# Patient Record
Sex: MALE | Race: WHITE | NOT HISPANIC OR LATINO
[De-identification: names, ages, dates, MRNs, and addresses within clinical notes are randomized per-mention and may not be internally consistent; named-entity substitution may affect disease eponyms.]

---

## 2019-11-19 ENCOUNTER — FORM ENCOUNTER (OUTPATIENT)
Age: 69
End: 2019-11-19

## 2019-11-20 ENCOUNTER — OUTPATIENT (OUTPATIENT)
Dept: OUTPATIENT SERVICES | Facility: HOSPITAL | Age: 69
LOS: 1 days | End: 2019-11-20
Payer: MEDICARE

## 2019-11-20 ENCOUNTER — APPOINTMENT (OUTPATIENT)
Dept: ORTHOPEDIC SURGERY | Facility: CLINIC | Age: 69
End: 2019-11-20
Payer: MEDICARE

## 2019-11-20 VITALS — HEIGHT: 68 IN | BODY MASS INDEX: 28.79 KG/M2 | WEIGHT: 190 LBS

## 2019-11-20 DIAGNOSIS — M25.561 PAIN IN RIGHT KNEE: ICD-10-CM

## 2019-11-20 PROBLEM — Z00.00 ENCOUNTER FOR PREVENTIVE HEALTH EXAMINATION: Status: ACTIVE | Noted: 2019-11-20

## 2019-11-20 PROCEDURE — 20553 NJX 1/MLT TRIGGER POINTS 3/>: CPT | Mod: RT

## 2019-11-20 PROCEDURE — 73562 X-RAY EXAM OF KNEE 3: CPT | Mod: 26,50

## 2019-11-20 PROCEDURE — 73562 X-RAY EXAM OF KNEE 3: CPT

## 2019-11-20 PROCEDURE — 99203 OFFICE O/P NEW LOW 30 MIN: CPT | Mod: 25

## 2019-11-21 PROBLEM — M25.561 RIGHT KNEE PAIN: Status: ACTIVE | Noted: 2019-11-20

## 2019-11-21 NOTE — HISTORY OF PRESENT ILLNESS
[de-identified] : Patient is a 69 M presenting to clinic for evaluation of his right hip and knee pain. He says the pain has been present for about two months. He denies any injuries. Pain is worse with standing and stairs. Has been using a cane for the last month in the left hand. He refuses to use any kind of pain medication. He has not had any other treatment to date. He points to the buttocks when asked where the hip pain is. He denies radicular pain. He denies numbness or tingling. He denies mechanical symptoms in the knee. He denies instability.\par \par Patient has DM, a-fib but not on a blood thinner. He has edema of the lower extremities and is not on a diuretic. He does not follow a PCP or cardiologist.

## 2019-11-21 NOTE — PROCEDURE
[Inflammation] : Inflammation [Injection] : Injection [Right] : of the right [Benefits] : benefits [Risk] : risk [Patient] : patient [Alcohol] : Alcohol [20] : a 20-gauge [Betadine] : Betadine [1% Lidocaine___(mL)] : [unfilled] mL of 1% Lidocaine [Bandage Applied] : a bandage [Triamcinolone 40mg/mL___(mL)] : [unfilled] ~UmL of 40mg/mL triamcinolone [None] : none [Tolerated Well] : The patient tolerated the procedure well [de-identified] : trigger point right buttocks

## 2019-11-21 NOTE — PHYSICAL EXAM
[Antalgic] : antalgic [Normal] : Normal bowel sounds, soft, non-tender, no hepato-splenomegaly and no abdominal mass palpated [de-identified] : NAD [de-identified] : RLE: Pitting edema of the lower extremity. No open wounds on the right side. No joint effusion. TTP about the lateral and medial knee joint. TTP about buttocks, GT and IT band. Most tender about the buttocks near the ischium. ROM of the knee from 5 to 110 wo pain. Ligaments stable to varus, valgus and Lachman exams. Hip ROM flex 110, IR 10, ER 30, abd 40. Strength 4/5 with hip and knee flex/ext. Unable to palpate distal pulses secondary to edema. Sensation intact. [de-identified] : Has swelling and edema of the bilateral lower extremities. [de-identified] : Overweight with edema in the BLE. Has wound on RLE. [de-identified] : XR of the right knee shows mild to moderate osteoarthritis.\par XR of the right hip shows moderate to severe joint space narrowing.\par MRI of the right knee shows mild osteoarthritis, subcutaneous edema and no ligamentous or meniscal injury.

## 2019-11-21 NOTE — DISCUSSION/SUMMARY
[de-identified] : - patient advised to see a primary care provider and likely cardiologist as well, referral made for vascular medicine\par - patient given trigger point injection to the right buttocks with pain relief from 7/10 to 0/10.\par - patient given exercises for strengthening of the hips and knees, Rx for PT given\par - Rx for mobic given\par - follow up in three weeks\par \par \par All medical record entries made by the PA/Yeison/Fellow are at my, Dr. Juan Antonio Cruz's direction and personally dictated by me on 11/20/2019]. I have reviewed the chart and agree that the record accurately reflects my personal performance of the history, physical exam, assessment, and plan. I have also personally directed reviewed, and agreed with the chart.\par

## 2019-11-21 NOTE — ASSESSMENT
[FreeTextEntry1] : 69 M with right knee and right hip osteoarthritis. Has multiple other medical problems that he is not seeing a physician for.

## 2019-12-10 ENCOUNTER — APPOINTMENT (OUTPATIENT)
Dept: ORTHOPEDIC SURGERY | Facility: CLINIC | Age: 69
End: 2019-12-10

## 2019-12-13 ENCOUNTER — APPOINTMENT (OUTPATIENT)
Dept: VASCULAR SURGERY | Facility: CLINIC | Age: 69
End: 2019-12-13
Payer: MEDICARE

## 2019-12-13 PROCEDURE — 93923 UPR/LXTR ART STDY 3+ LVLS: CPT

## 2019-12-13 PROCEDURE — 99205 OFFICE O/P NEW HI 60 MIN: CPT

## 2019-12-15 NOTE — HISTORY OF PRESENT ILLNESS
[FreeTextEntry1] : 70 y/o M with HTN. HLD, DM (A1C ~7) and diabetic neuropathy presents today for initial evaluation of bilateral feet swelling, redness and discoloration. He reports that he occasionally feels a burning sensation in his bilateral feet. He presents here for a venous evaluation because he states that his feet also feel cold to the touch and he is concerned about this.\par Patient also reports right knee pain.

## 2019-12-15 NOTE — ASSESSMENT
[FreeTextEntry1] : 68 y/o M with HTN. HLD, DM (A1C ~7) and diabetic neuropathy presents with bilateral feet swelling, redness and discoloration. JASMIN/PVR demonstrates normal circulation. Palpable DP and PT pulses. I told patient that his swelling and erythema could be due to diet, medication, Raynaud's syndrome, or dermatitis. I referred him to a dermatologist to evaluate his LE discoloration. I wrote a prescription for compression stockings to reduce swelling. \par I referred patient to orthopedist, Dr. Hoffman, for right knee pain.\par No vascular intervention needed at this time. Patient will follow up here as needed. [Arterial/Venous Disease] : arterial/venous disease [Medication Management] : medication management [Foot care/Footwear] : foot care/footwear

## 2019-12-15 NOTE — ADDENDUM
[FreeTextEntry1] : This note was written by Jaymie Glover on 12/13/2019  acting as scribe for Dr. Bradley.

## 2019-12-15 NOTE — END OF VISIT
[FreeTextEntry3] : All medical record entries made by the Scribe were at my, Dr. Lomeli's, discretion and personally dictated by me on 12/13/2019 . I have reviewed the chart and agree that the record accurately reflects my personal performance of the history, physical exam, assessment and plan. I have also personally directed, reviewed and agreed to the chart.

## 2019-12-15 NOTE — PHYSICAL EXAM
[Normal Breath Sounds] : Normal breath sounds [Alert] : alert [Calm] : calm [2+] : right 2+ [Ankle Swelling (On Exam)] : present [Ankle Swelling Bilaterally] : bilaterally  [JVD] : no jugular venous distention  [de-identified] : Well appearing. NAD [de-identified] : NCAT [de-identified] : FROM [de-identified] : erythema and discoloration of b/l feet and ankles

## 2020-01-09 ENCOUNTER — APPOINTMENT (OUTPATIENT)
Dept: ORTHOPEDIC SURGERY | Facility: CLINIC | Age: 70
End: 2020-01-09

## 2020-01-15 ENCOUNTER — APPOINTMENT (OUTPATIENT)
Dept: CARDIOLOGY | Facility: CLINIC | Age: 70
End: 2020-01-15
Payer: MEDICARE

## 2020-01-15 ENCOUNTER — OUTPATIENT (OUTPATIENT)
Dept: OUTPATIENT SERVICES | Facility: HOSPITAL | Age: 70
LOS: 1 days | End: 2020-01-15
Payer: MEDICARE

## 2020-01-15 ENCOUNTER — NON-APPOINTMENT (OUTPATIENT)
Age: 70
End: 2020-01-15

## 2020-01-15 VITALS — HEART RATE: 59 BPM | SYSTOLIC BLOOD PRESSURE: 129 MMHG | DIASTOLIC BLOOD PRESSURE: 80 MMHG | OXYGEN SATURATION: 99 %

## 2020-01-15 DIAGNOSIS — Z80.42 FAMILY HISTORY OF MALIGNANT NEOPLASM OF PROSTATE: ICD-10-CM

## 2020-01-15 DIAGNOSIS — Z83.3 FAMILY HISTORY OF DIABETES MELLITUS: ICD-10-CM

## 2020-01-15 DIAGNOSIS — H26.9 UNSPECIFIED CATARACT: ICD-10-CM

## 2020-01-15 DIAGNOSIS — I48.91 UNSPECIFIED ATRIAL FIBRILLATION: ICD-10-CM

## 2020-01-15 DIAGNOSIS — E11.9 TYPE 2 DIABETES MELLITUS W/OUT COMPLICATIONS: ICD-10-CM

## 2020-01-15 DIAGNOSIS — I10 ESSENTIAL (PRIMARY) HYPERTENSION: ICD-10-CM

## 2020-01-15 DIAGNOSIS — E78.5 HYPERLIPIDEMIA, UNSPECIFIED: ICD-10-CM

## 2020-01-15 DIAGNOSIS — I25.10 ATHEROSCLEROTIC HEART DISEASE OF NATIVE CORONARY ARTERY WITHOUT ANGINA PECTORIS: ICD-10-CM

## 2020-01-15 DIAGNOSIS — I73.9 PERIPHERAL VASCULAR DISEASE, UNSPECIFIED: ICD-10-CM

## 2020-01-15 PROCEDURE — 93306 TTE W/DOPPLER COMPLETE: CPT | Mod: 26

## 2020-01-15 PROCEDURE — 99205 OFFICE O/P NEW HI 60 MIN: CPT | Mod: PD

## 2020-01-15 PROCEDURE — 93000 ELECTROCARDIOGRAM COMPLETE: CPT | Mod: PD

## 2020-01-15 RX ORDER — METOPROLOL SUCCINATE 50 MG/1
50 TABLET, EXTENDED RELEASE ORAL
Refills: 0 | Status: ACTIVE | COMMUNITY

## 2020-01-15 RX ORDER — MELOXICAM 7.5 MG/1
7.5 TABLET ORAL TWICE DAILY
Qty: 60 | Refills: 0 | Status: DISCONTINUED | COMMUNITY
Start: 2019-11-20 | End: 2020-01-15

## 2020-01-15 NOTE — DISCUSSION/SUMMARY
[FreeTextEntry1] : 1.  a fib -  will start eliquis.  He didn’t start anything in the past for unclear reasons\par \par 2.  cta-- will do cardiac cath since he has a positive CTA.

## 2020-01-15 NOTE — REASON FOR VISIT
[Consultation] : a consultation regarding [Atrial Fibrillation] : atrial fibrillation [Coronary Artery Disease] : coronary artery disease

## 2020-01-15 NOTE — HISTORY OF PRESENT ILLNESS
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  He went into a fib in 2016 and was not put on anticoagulation.  He had a CTA in 2017 which showed a 50-70% proximal stenosis.  He was seen by a [id number] who had recommended a cath which he did not do because his mom was very sick.    He has had swelling in the legs and was started on lasix.  He gets chest discomfort.

## 2020-01-15 NOTE — PHYSICAL EXAM
[General Appearance - Well Developed] : well developed [Normal Appearance] : normal appearance [No Deformities] : no deformities [General Appearance - Well Nourished] : well nourished [Well Groomed] : well groomed [General Appearance - In No Acute Distress] : no acute distress [Normal Conjunctiva] : the conjunctiva exhibited no abnormalities [Eyelids - No Xanthelasma] : the eyelids demonstrated no xanthelasmas [Normal Oral Mucosa] : normal oral mucosa [No Oral Pallor] : no oral pallor [Normal Jugular Venous V Waves Present] : normal jugular venous V waves present [Normal Jugular Venous A Waves Present] : normal jugular venous A waves present [No Oral Cyanosis] : no oral cyanosis [Heart Rate And Rhythm] : heart rate and rhythm were normal [Murmurs] : no murmurs present [Heart Sounds] : normal S1 and S2 [No Jugular Venous Reynolds A Waves] : no jugular venous reynolds A waves [Respiration, Rhythm And Depth] : normal respiratory rhythm and effort [Abdomen Tenderness] : non-tender [Abdomen Soft] : soft [Auscultation Breath Sounds / Voice Sounds] : lungs were clear to auscultation bilaterally [Exaggerated Use Of Accessory Muscles For Inspiration] : no accessory muscle use [] : no hepato-splenomegaly [Abdomen Mass (___ Cm)] : no abdominal mass palpated [Gait - Sufficient For Exercise Testing] : the gait was sufficient for exercise testing [Abnormal Walk] : normal gait

## 2020-01-17 ENCOUNTER — INPATIENT (INPATIENT)
Facility: HOSPITAL | Age: 70
LOS: 1 days | Discharge: HOME | DRG: 247 | End: 2020-01-19
Attending: HOSPITALIST | Admitting: INTERNAL MEDICINE
Payer: MEDICARE

## 2020-01-17 VITALS
HEART RATE: 82 BPM | OXYGEN SATURATION: 99 % | SYSTOLIC BLOOD PRESSURE: 143 MMHG | WEIGHT: 227.96 LBS | RESPIRATION RATE: 16 BRPM | TEMPERATURE: 99 F | HEIGHT: 69 IN | DIASTOLIC BLOOD PRESSURE: 71 MMHG

## 2020-01-17 DIAGNOSIS — I25.10 ATHEROSCLEROTIC HEART DISEASE OF NATIVE CORONARY ARTERY WITHOUT ANGINA PECTORIS: ICD-10-CM

## 2020-01-17 LAB
ANION GAP SERPL CALC-SCNC: 15 MMOL/L — SIGNIFICANT CHANGE UP (ref 5–17)
BUN SERPL-MCNC: 31 MG/DL — HIGH (ref 7–23)
CALCIUM SERPL-MCNC: 9.6 MG/DL — SIGNIFICANT CHANGE UP (ref 8.4–10.5)
CHLORIDE SERPL-SCNC: 94 MMOL/L — LOW (ref 96–108)
CO2 SERPL-SCNC: 29 MMOL/L — SIGNIFICANT CHANGE UP (ref 22–31)
CREAT SERPL-MCNC: 0.82 MG/DL — SIGNIFICANT CHANGE UP (ref 0.5–1.3)
GLUCOSE BLDC GLUCOMTR-MCNC: 119 MG/DL — HIGH (ref 70–99)
GLUCOSE SERPL-MCNC: 95 MG/DL — SIGNIFICANT CHANGE UP (ref 70–99)
HCT VFR BLD CALC: 45.9 % — SIGNIFICANT CHANGE UP (ref 39–50)
HGB BLD-MCNC: 14.9 G/DL — SIGNIFICANT CHANGE UP (ref 13–17)
MCHC RBC-ENTMCNC: 30.6 PG — SIGNIFICANT CHANGE UP (ref 27–34)
MCHC RBC-ENTMCNC: 32.5 GM/DL — SIGNIFICANT CHANGE UP (ref 32–36)
MCV RBC AUTO: 94.3 FL — SIGNIFICANT CHANGE UP (ref 80–100)
NRBC # BLD: 0 /100 WBCS — SIGNIFICANT CHANGE UP (ref 0–0)
PLATELET # BLD AUTO: 183 K/UL — SIGNIFICANT CHANGE UP (ref 150–400)
POTASSIUM SERPL-MCNC: 3.9 MMOL/L — SIGNIFICANT CHANGE UP (ref 3.5–5.3)
POTASSIUM SERPL-SCNC: 3.9 MMOL/L — SIGNIFICANT CHANGE UP (ref 3.5–5.3)
RBC # BLD: 4.87 M/UL — SIGNIFICANT CHANGE UP (ref 4.2–5.8)
RBC # FLD: 14.7 % — HIGH (ref 10.3–14.5)
SODIUM SERPL-SCNC: 138 MMOL/L — SIGNIFICANT CHANGE UP (ref 135–145)
WBC # BLD: 7.62 K/UL — SIGNIFICANT CHANGE UP (ref 3.8–10.5)
WBC # FLD AUTO: 7.62 K/UL — SIGNIFICANT CHANGE UP (ref 3.8–10.5)

## 2020-01-17 PROCEDURE — 99152 MOD SED SAME PHYS/QHP 5/>YRS: CPT | Mod: GC

## 2020-01-17 PROCEDURE — 93010 ELECTROCARDIOGRAM REPORT: CPT

## 2020-01-17 PROCEDURE — 93458 L HRT ARTERY/VENTRICLE ANGIO: CPT | Mod: 26,59,GC

## 2020-01-17 PROCEDURE — 92928 PRQ TCAT PLMT NTRAC ST 1 LES: CPT | Mod: LD,GC

## 2020-01-17 RX ORDER — APIXABAN 2.5 MG/1
2.5 TABLET, FILM COATED ORAL EVERY 12 HOURS
Refills: 0 | Status: DISCONTINUED | OUTPATIENT
Start: 2020-01-18 | End: 2020-01-19

## 2020-01-17 RX ORDER — INSULIN LISPRO 100/ML
VIAL (ML) SUBCUTANEOUS AT BEDTIME
Refills: 0 | Status: DISCONTINUED | OUTPATIENT
Start: 2020-01-17 | End: 2020-01-19

## 2020-01-17 RX ORDER — CEFAZOLIN SODIUM 1 G
1000 VIAL (EA) INJECTION ONCE
Refills: 0 | Status: COMPLETED | OUTPATIENT
Start: 2020-01-17 | End: 2020-01-17

## 2020-01-17 RX ORDER — CEFAZOLIN SODIUM 1 G
1000 VIAL (EA) INJECTION EVERY 8 HOURS
Refills: 0 | Status: DISCONTINUED | OUTPATIENT
Start: 2020-01-18 | End: 2020-01-18

## 2020-01-17 RX ORDER — SODIUM CHLORIDE 9 MG/ML
1000 INJECTION, SOLUTION INTRAVENOUS
Refills: 0 | Status: DISCONTINUED | OUTPATIENT
Start: 2020-01-17 | End: 2020-01-19

## 2020-01-17 RX ORDER — DEXTROSE 50 % IN WATER 50 %
25 SYRINGE (ML) INTRAVENOUS ONCE
Refills: 0 | Status: DISCONTINUED | OUTPATIENT
Start: 2020-01-17 | End: 2020-01-19

## 2020-01-17 RX ORDER — ASPIRIN/CALCIUM CARB/MAGNESIUM 324 MG
81 TABLET ORAL DAILY
Refills: 0 | Status: DISCONTINUED | OUTPATIENT
Start: 2020-01-17 | End: 2020-01-19

## 2020-01-17 RX ORDER — METOPROLOL TARTRATE 50 MG
50 TABLET ORAL DAILY
Refills: 0 | Status: DISCONTINUED | OUTPATIENT
Start: 2020-01-17 | End: 2020-01-19

## 2020-01-17 RX ORDER — TICAGRELOR 90 MG/1
90 TABLET ORAL EVERY 12 HOURS
Refills: 0 | Status: DISCONTINUED | OUTPATIENT
Start: 2020-01-17 | End: 2020-01-19

## 2020-01-17 RX ORDER — FUROSEMIDE 40 MG
40 TABLET ORAL DAILY
Refills: 0 | Status: DISCONTINUED | OUTPATIENT
Start: 2020-01-17 | End: 2020-01-19

## 2020-01-17 RX ORDER — DEXTROSE 50 % IN WATER 50 %
12.5 SYRINGE (ML) INTRAVENOUS ONCE
Refills: 0 | Status: DISCONTINUED | OUTPATIENT
Start: 2020-01-17 | End: 2020-01-19

## 2020-01-17 RX ORDER — ATORVASTATIN CALCIUM 80 MG/1
40 TABLET, FILM COATED ORAL AT BEDTIME
Refills: 0 | Status: DISCONTINUED | OUTPATIENT
Start: 2020-01-17 | End: 2020-01-19

## 2020-01-17 RX ORDER — DEXTROSE 50 % IN WATER 50 %
15 SYRINGE (ML) INTRAVENOUS ONCE
Refills: 0 | Status: DISCONTINUED | OUTPATIENT
Start: 2020-01-17 | End: 2020-01-19

## 2020-01-17 RX ORDER — INSULIN GLARGINE 100 [IU]/ML
16 INJECTION, SOLUTION SUBCUTANEOUS AT BEDTIME
Refills: 0 | Status: DISCONTINUED | OUTPATIENT
Start: 2020-01-17 | End: 2020-01-18

## 2020-01-17 RX ORDER — GLUCAGON INJECTION, SOLUTION 0.5 MG/.1ML
1 INJECTION, SOLUTION SUBCUTANEOUS ONCE
Refills: 0 | Status: DISCONTINUED | OUTPATIENT
Start: 2020-01-17 | End: 2020-01-19

## 2020-01-17 RX ORDER — INSULIN LISPRO 100/ML
VIAL (ML) SUBCUTANEOUS
Refills: 0 | Status: DISCONTINUED | OUTPATIENT
Start: 2020-01-17 | End: 2020-01-19

## 2020-01-17 RX ORDER — CEFAZOLIN SODIUM 1 G
VIAL (EA) INJECTION
Refills: 0 | Status: DISCONTINUED | OUTPATIENT
Start: 2020-01-17 | End: 2020-01-18

## 2020-01-17 RX ORDER — LOSARTAN POTASSIUM 100 MG/1
50 TABLET, FILM COATED ORAL DAILY
Refills: 0 | Status: DISCONTINUED | OUTPATIENT
Start: 2020-01-17 | End: 2020-01-19

## 2020-01-17 RX ADMIN — Medication 100 MILLIGRAM(S): at 18:59

## 2020-01-17 NOTE — CHART NOTE - NSCHARTNOTEFT_GEN_A_CORE
Patient underwent a PCI procedure and is being admitted as they are at increased risk for major adverse cardiac and vascular events if discharged due to the following high risk characteristics:  performed >2 vessel system pci

## 2020-01-17 NOTE — H&P CARDIOLOGY - PMH
Atrial fibrillation    CAD (coronary artery disease)    Diabetes 1.5, managed as type 2    HLD (hyperlipidemia)    HTN (hypertension)    PVD (peripheral vascular disease)

## 2020-01-17 NOTE — H&P CARDIOLOGY - HISTORY OF PRESENT ILLNESS
Narrative: This is a 68y/o  male with no implantable devices with PMHX of Afib( eliquis ordered not taken yet), HLD, HTN, Type 2 DM uncomplicated compliant with meds last Hgb AIC 6.8 , PVD, Cataracts and right knee pain . Pt had cath in 2000 which was normal . He went into Afib in 2016 and was not on anticoagulation therapy. He had CTA in 2017 which showed a 50-70% proximal stenosis. Pt recommended for Cardiac cath today with Dr. Cabrrea today [facility]. Pt has PVD and LE swelling foul smelling small ulcers on foot. Currently CP free no sob no lightheadedness or dizziness noted .    < from: TTE with Doppler (w/Cont) (01.15.20 @ 09:52) >  Conclusions:  Technically difficult study.  1. Mitral annular calcification, otherwise normal mitral  valve. Minimal mitral regurgitation.  2. Aortic valve not well visualized; appears calcified.  Peak transaortic valve gradient equals 10 mm Hg, mean  transaortic valve gradient equals 5 mm Hg. Minimal aortic  regurgitation.  3. Severely dilated left atrium.  LA volume index = 60  cc/m2.  4. Patient in atrial fibrillation; ejection fraction varies  with R-R interval. Endocardial visualization enhanced with  intravenous injection of Ultrasonic Enhancing Agent  (Definity). Overall normal left ventricular systolic  function. The basal inferoseptum is hypokinetic.  5. The right ventricle is not well visualized.  *** No previous Echo exam.  ------------------------------------------------------------------------  Confirmed on  1/15/2020 - 16:34:55 by Erich Smith M.D.  ------------------------------------------------------------------------    < end of copied text >    Symptoms:        Angina (Class):        Ischemic Symptoms:     Heart Failure:        Systolic/Diastolic/Combined:        NYHA Class (within 2 weeks):     Assessment of LVEF:       EF:        Assessed by:        Date:     Prior Cardiac Interventions:       PCI's:        CABG:     Noninvasive Testing:   Stress Test: Date:        Protocol:        Duration of Exercise:        Symptoms:        EKG Changes:        DTS:        Myocardial Imaging:        Risk Assessment:     Echo: 1/11/20 Normal EF 64% mild concentric LVH, left atrium is moderately dilated. Trace MR, Trace TR, Trace pulmonic Valvular regurgitation.    Antianginal Therapies:        Beta Blockers:  Metoprolol        Calcium Channel Blockers:        Long Acting Nitrates:        Ranexa:     Associated Risk Factors:        Cerebrovascular Disease: N/A       Chronic Lung Disease: N/A       Peripheral Arterial Disease: N/A       Chronic Kidney Disease (if yes, what is GFR): N/A       Uncontrolled Diabetes (if yes, what is HgbA1C or FBS): N/A       Poorly Controlled Hypertension (if yes, what is SBP): N/A       Morbid Obesity (if yes, what is BMI): N/A       History of Recent Ventricular Arrhythmia: N/A       Inability to Ambulate Safely: N/A       Need for Therapeutic Anticoagulation: N/A       Antiplatelet or Contrast Allergy: N/A

## 2020-01-18 DIAGNOSIS — R09.89 OTHER SPECIFIED SYMPTOMS AND SIGNS INVOLVING THE CIRCULATORY AND RESPIRATORY SYSTEMS: ICD-10-CM

## 2020-01-18 DIAGNOSIS — R60.0 LOCALIZED EDEMA: ICD-10-CM

## 2020-01-18 DIAGNOSIS — Z29.9 ENCOUNTER FOR PROPHYLACTIC MEASURES, UNSPECIFIED: ICD-10-CM

## 2020-01-18 DIAGNOSIS — I48.91 UNSPECIFIED ATRIAL FIBRILLATION: ICD-10-CM

## 2020-01-18 DIAGNOSIS — I73.9 PERIPHERAL VASCULAR DISEASE, UNSPECIFIED: ICD-10-CM

## 2020-01-18 DIAGNOSIS — I25.10 ATHEROSCLEROTIC HEART DISEASE OF NATIVE CORONARY ARTERY WITHOUT ANGINA PECTORIS: ICD-10-CM

## 2020-01-18 DIAGNOSIS — R05 COUGH: ICD-10-CM

## 2020-01-18 DIAGNOSIS — Z02.9 ENCOUNTER FOR ADMINISTRATIVE EXAMINATIONS, UNSPECIFIED: ICD-10-CM

## 2020-01-18 DIAGNOSIS — E13.9 OTHER SPECIFIED DIABETES MELLITUS WITHOUT COMPLICATIONS: ICD-10-CM

## 2020-01-18 DIAGNOSIS — I10 ESSENTIAL (PRIMARY) HYPERTENSION: ICD-10-CM

## 2020-01-18 DIAGNOSIS — Z86.79 PERSONAL HISTORY OF OTHER DISEASES OF THE CIRCULATORY SYSTEM: Chronic | ICD-10-CM

## 2020-01-18 DIAGNOSIS — I48.20 CHRONIC ATRIAL FIBRILLATION, UNSPECIFIED: ICD-10-CM

## 2020-01-18 LAB
ANION GAP SERPL CALC-SCNC: 17 MMOL/L — SIGNIFICANT CHANGE UP (ref 5–17)
APTT BLD: 31 SEC — SIGNIFICANT CHANGE UP (ref 27.5–36.3)
BASOPHILS # BLD AUTO: 0.05 K/UL — SIGNIFICANT CHANGE UP (ref 0–0.2)
BASOPHILS NFR BLD AUTO: 0.6 % — SIGNIFICANT CHANGE UP (ref 0–2)
BUN SERPL-MCNC: 25 MG/DL — HIGH (ref 7–23)
CALCIUM SERPL-MCNC: 9.4 MG/DL — SIGNIFICANT CHANGE UP (ref 8.4–10.5)
CHLORIDE SERPL-SCNC: 98 MMOL/L — SIGNIFICANT CHANGE UP (ref 96–108)
CHOLEST SERPL-MCNC: 126 MG/DL — SIGNIFICANT CHANGE UP (ref 10–199)
CK MB CFR SERPL CALC: 7.2 NG/ML — HIGH (ref 0–6.7)
CO2 SERPL-SCNC: 22 MMOL/L — SIGNIFICANT CHANGE UP (ref 22–31)
CREAT SERPL-MCNC: 0.8 MG/DL — SIGNIFICANT CHANGE UP (ref 0.5–1.3)
EOSINOPHIL # BLD AUTO: 0.1 K/UL — SIGNIFICANT CHANGE UP (ref 0–0.5)
EOSINOPHIL NFR BLD AUTO: 1.2 % — SIGNIFICANT CHANGE UP (ref 0–6)
GLUCOSE BLDC GLUCOMTR-MCNC: 122 MG/DL — HIGH (ref 70–99)
GLUCOSE BLDC GLUCOMTR-MCNC: 124 MG/DL — HIGH (ref 70–99)
GLUCOSE BLDC GLUCOMTR-MCNC: 129 MG/DL — HIGH (ref 70–99)
GLUCOSE BLDC GLUCOMTR-MCNC: 136 MG/DL — HIGH (ref 70–99)
GLUCOSE SERPL-MCNC: 130 MG/DL — HIGH (ref 70–99)
HBA1C BLD-MCNC: 7.7 % — HIGH (ref 4–5.6)
HCT VFR BLD CALC: 43.7 % — SIGNIFICANT CHANGE UP (ref 39–50)
HDLC SERPL-MCNC: 32 MG/DL — LOW
HGB BLD-MCNC: 14.1 G/DL — SIGNIFICANT CHANGE UP (ref 13–17)
IMM GRANULOCYTES NFR BLD AUTO: 0.2 % — SIGNIFICANT CHANGE UP (ref 0–1.5)
INR BLD: 1.09 RATIO — SIGNIFICANT CHANGE UP (ref 0.88–1.16)
LIPID PNL WITH DIRECT LDL SERPL: 55 MG/DL — SIGNIFICANT CHANGE UP
LYMPHOCYTES # BLD AUTO: 1.02 K/UL — SIGNIFICANT CHANGE UP (ref 1–3.3)
LYMPHOCYTES # BLD AUTO: 12.6 % — LOW (ref 13–44)
MAGNESIUM SERPL-MCNC: 2 MG/DL — SIGNIFICANT CHANGE UP (ref 1.6–2.6)
MCHC RBC-ENTMCNC: 30.3 PG — SIGNIFICANT CHANGE UP (ref 27–34)
MCHC RBC-ENTMCNC: 32.3 GM/DL — SIGNIFICANT CHANGE UP (ref 32–36)
MCV RBC AUTO: 94 FL — SIGNIFICANT CHANGE UP (ref 80–100)
MONOCYTES # BLD AUTO: 0.86 K/UL — SIGNIFICANT CHANGE UP (ref 0–0.9)
MONOCYTES NFR BLD AUTO: 10.7 % — SIGNIFICANT CHANGE UP (ref 2–14)
NEUTROPHILS # BLD AUTO: 6.02 K/UL — SIGNIFICANT CHANGE UP (ref 1.8–7.4)
NEUTROPHILS NFR BLD AUTO: 74.7 % — SIGNIFICANT CHANGE UP (ref 43–77)
NRBC # BLD: 0 /100 WBCS — SIGNIFICANT CHANGE UP (ref 0–0)
NT-PROBNP SERPL-SCNC: 326 PG/ML — HIGH (ref 0–300)
PLATELET # BLD AUTO: 174 K/UL — SIGNIFICANT CHANGE UP (ref 150–400)
POTASSIUM SERPL-MCNC: 3.9 MMOL/L — SIGNIFICANT CHANGE UP (ref 3.5–5.3)
POTASSIUM SERPL-SCNC: 3.9 MMOL/L — SIGNIFICANT CHANGE UP (ref 3.5–5.3)
PROTHROM AB SERPL-ACNC: 12.5 SEC — SIGNIFICANT CHANGE UP (ref 10–12.9)
RAPID RVP RESULT: SIGNIFICANT CHANGE UP
RBC # BLD: 4.65 M/UL — SIGNIFICANT CHANGE UP (ref 4.2–5.8)
RBC # FLD: 14.8 % — HIGH (ref 10.3–14.5)
SODIUM SERPL-SCNC: 137 MMOL/L — SIGNIFICANT CHANGE UP (ref 135–145)
TOTAL CHOLESTEROL/HDL RATIO MEASUREMENT: 4 RATIO — SIGNIFICANT CHANGE UP (ref 3.4–9.6)
TRIGL SERPL-MCNC: 196 MG/DL — HIGH (ref 10–149)
TROPONIN T, HIGH SENSITIVITY RESULT: 52 NG/L — HIGH (ref 0–51)
TROPONIN T, HIGH SENSITIVITY RESULT: 55 NG/L — HIGH (ref 0–51)
WBC # BLD: 8.07 K/UL — SIGNIFICANT CHANGE UP (ref 3.8–10.5)
WBC # FLD AUTO: 8.07 K/UL — SIGNIFICANT CHANGE UP (ref 3.8–10.5)

## 2020-01-18 PROCEDURE — 99223 1ST HOSP IP/OBS HIGH 75: CPT | Mod: AI

## 2020-01-18 PROCEDURE — 71046 X-RAY EXAM CHEST 2 VIEWS: CPT | Mod: 26

## 2020-01-18 PROCEDURE — 99223 1ST HOSP IP/OBS HIGH 75: CPT | Mod: GC

## 2020-01-18 PROCEDURE — 99233 SBSQ HOSP IP/OBS HIGH 50: CPT

## 2020-01-18 PROCEDURE — 12345: CPT | Mod: NC

## 2020-01-18 PROCEDURE — 93010 ELECTROCARDIOGRAM REPORT: CPT

## 2020-01-18 RX ORDER — INSULIN GLARGINE 100 [IU]/ML
10 INJECTION, SOLUTION SUBCUTANEOUS EVERY MORNING
Refills: 0 | Status: DISCONTINUED | OUTPATIENT
Start: 2020-01-18 | End: 2020-01-19

## 2020-01-18 RX ORDER — SENNA PLUS 8.6 MG/1
2 TABLET ORAL DAILY
Refills: 0 | Status: DISCONTINUED | OUTPATIENT
Start: 2020-01-18 | End: 2020-01-19

## 2020-01-18 RX ORDER — ACETAMINOPHEN 500 MG
650 TABLET ORAL ONCE
Refills: 0 | Status: COMPLETED | OUTPATIENT
Start: 2020-01-18 | End: 2020-01-18

## 2020-01-18 RX ADMIN — ATORVASTATIN CALCIUM 40 MILLIGRAM(S): 80 TABLET, FILM COATED ORAL at 20:44

## 2020-01-18 RX ADMIN — APIXABAN 2.5 MILLIGRAM(S): 2.5 TABLET, FILM COATED ORAL at 17:07

## 2020-01-18 RX ADMIN — TICAGRELOR 90 MILLIGRAM(S): 90 TABLET ORAL at 05:57

## 2020-01-18 RX ADMIN — Medication 650 MILLIGRAM(S): at 05:58

## 2020-01-18 RX ADMIN — Medication 100 MILLIGRAM(S): at 03:36

## 2020-01-18 RX ADMIN — Medication 650 MILLIGRAM(S): at 07:34

## 2020-01-18 RX ADMIN — LOSARTAN POTASSIUM 50 MILLIGRAM(S): 100 TABLET, FILM COATED ORAL at 12:03

## 2020-01-18 RX ADMIN — INSULIN GLARGINE 10 UNIT(S): 100 INJECTION, SOLUTION SUBCUTANEOUS at 07:57

## 2020-01-18 RX ADMIN — Medication 40 MILLIGRAM(S): at 05:57

## 2020-01-18 RX ADMIN — Medication 200 MILLIGRAM(S): at 23:39

## 2020-01-18 RX ADMIN — SENNA PLUS 2 TABLET(S): 8.6 TABLET ORAL at 17:07

## 2020-01-18 RX ADMIN — Medication 50 MILLIGRAM(S): at 12:03

## 2020-01-18 RX ADMIN — TICAGRELOR 90 MILLIGRAM(S): 90 TABLET ORAL at 17:07

## 2020-01-18 RX ADMIN — Medication 81 MILLIGRAM(S): at 05:57

## 2020-01-18 RX ADMIN — APIXABAN 2.5 MILLIGRAM(S): 2.5 TABLET, FILM COATED ORAL at 05:58

## 2020-01-18 NOTE — PROGRESS NOTE ADULT - ASSESSMENT
HPI:  69yM w/ PMHX of Afib(eliquis ordered not taken yet), HLD, HTN, DM2 on insulin for 3 days with reportedly AIC ~7 , PVD, Cataracts and right knee pain presents for monitoring after LHC and stent placement. Pt had cath in 2000 which was normal. He went into Afib in 2016 and was not on anticoagulation therapy. He had CTA around same time which showed a 50-70% proximal stenosis and calcium score >400. Unclear what intervention was taken afterwards. Recently pt started to follow up with MDs regarding his lower extremities and knee pain. Pt states in process of surgical work up for hip replacement his cardiac issues became more apparent. Pt recommended for Cardiac cath today with Dr. Cabrera today [facility]. Pt has PVD and LE swelling small ulcers on foot. Pt underwent LHC and had placement of stents in OM and L prox. Pt had episode of vague L sided chest discomfort not aggravated or improved by known stimuli. Has improved significantly. Also endorses 2 day history of cough with severe sinus congestion associated with worsening green sputum. Endorses some blood on toilet paper after wiping. Has been constipated with hard pellet like stool. Denies fevers, chills, SOB, dizziness, palpitations. ID reportedly called for lower extremity lesions in [facility].     Pt declined oral meds overnight as he took meds in AM. (18 Jan 2020 05:49)    s/p PCI/FELA to mLAD and OM1 on 1/17/20  -C/O intermittent left sided chest discomfort last few minutes which started last night while in bed. Denies associated dyspnea, palpitations.   -EKG's with no acute changes  -VS stable per flowsheet  -Troponins trend is flat, may check 3rd set CE's  -Currently pain free. Pt instructed to notify MD if symptoms persist or worsen or new s/s develop.  -Cards consult to follow   -Continue ASA/Plavix/statin s/p PCI  -Continue Eliquis for Afib  -Discharge teaching reviewed with pt including risks of triple therapy  -All other care per primary team

## 2020-01-18 NOTE — H&P ADULT - ATTENDING COMMENTS
I was asked to see this patient by the hospitalist in charge overnight. Day hospitalist to assume care for this patient in AM and thereafter.

## 2020-01-18 NOTE — CHART NOTE - NSCHARTNOTEFT_GEN_A_CORE
Medicine PA Jennifer     QUINCY MELLO  MRN-[id number]  Allergies    No Known Allergies    Intolerances     Called to evaluate patient for chest discomfort. Pt seen and evaluated at bedside. Pt currently complaining of 4-5/10 discomfort in the L. chest wall. Pain is non radiating and nonpleuritic. Pt denies any SOB,     Vital Signs Last 24 Hrs  T(C): 36.5 (20 @ 03:34), Max: 37.2 (20 @ 16:27)  T(F): 97.7 (20 @ 03:34), Max: 99 (20 @ 16:27)  HR: 84 (20 @ 03:34) (68 - 84)  BP: 120/69 (20 @ 03:34) (104/62 - 143/71)  BP(mean): 95 (20 @ 16:27) (95 - 95)  RR: 19 (20 @ 03:34) (16 - 19)  SpO2: 97% (20 @ 03:34) (96% - 99%)  Daily Height in cm: 175.26 (2020 16:27)    Daily Weight in k.4 (2020 16:27)  I&O's Summary    2020 07:01  -  2020 05:26  --------------------------------------------------------  IN: 480 mL / OUT: 0 mL / NET: 480 mL      CAPILLARY BLOOD GLUCOSE                          14.9   7.62  )-----------( 183      ( 2020 17:02 )             45.9     01-17    138  |  94<L>  |  31<H>  ----------------------------<  95  3.9   |  29  |  0.82    Ca    9.6      2020 17:02                Radiology:    PHYSICAL EXAM:  GENERAL: NAD, well-developed  HEAD:  Atraumatic, Normocephalic  EYES: EOMI, PERRLA, conjunctiva and sclera clear  NECK: Supple, No JVD  CHEST/LUNG: Clear to auscultation bilaterally; No wheeze  HEART: Regular rate and rhythm; No murmurs, rubs, or gallops  ABDOMEN: Soft, Nontender, Nondistended; Bowel sounds present  EXTREMITIES:  2+ Peripheral Pulses, No clubbing, cyanosis, or edema  PSYCH: AAOx3  NEUROLOGY: non-focal  SKIN: No rashes or lesions    Assessment/Plan: HPI:  Narrative: This is a 68y/o  male with no implantable devices with PMHX of Afib( eliquis ordered not taken yet), HLD, HTN, Type 2 DM uncomplicated compliant with meds last Hgb AIC 6.8 , PVD, Cataracts and right knee pain . Pt had cath in  which was normal . He went into Afib in 2016 and was not on anticoagulation therapy. He had CTA in 2017 which showed a 50-70% proximal stenosis. Pt recommended for Cardiac cath today with Dr. Cabrera today [facility]. Pt has PVD and LE swelling foul smelling small ulcers on foot. Currently CP free no sob no lightheadedness or dizziness noted . Medicine PA Jennifer     QUINCY MELLO  MRN-[id number]  Allergies    No Known Allergies    Intolerances     Called to evaluate patient for chest discomfort. Pt seen and evaluated at bedside. Pt currently complaining of 4-5/10 discomfort in the L. chest wall. Pain is non radiating and nonpleuritic. Pt denies any SOB, extremity pain, N/V/D, abdominal pain,     Vital Signs Last 24 Hrs  T(C): 36.5 (20 @ 03:34), Max: 37.2 (20 @ 16:27)  T(F): 97.7 (20 @ 03:34), Max: 99 (20 @ 16:27)  HR: 84 (20 @ 03:34) (68 - 84)  BP: 120/69 (20 @ 03:34) (104/62 - 143/71)  BP(mean): 95 (20 @ 16:27) (95 - 95)  RR: 19 (20 @ 03:34) (16 - 19)  SpO2: 97% (20 @ 03:34) (96% - 99%)  Daily Height in cm: 175.26 (2020 16:27)    Daily Weight in k.4 (2020 16:27)  I&O's Summary    2020 07:01  -  2020 05:26  --------------------------------------------------------  IN: 480 mL / OUT: 0 mL / NET: 480 mL      CAPILLARY BLOOD GLUCOSE                          14.9   7.62  )-----------( 183      ( 2020 17:02 )             45.9     -17    138  |  94<L>  |  31<H>  ----------------------------<  95  3.9   |  29  |  0.82    Ca    9.6      2020 17:02                Radiology:    PHYSICAL EXAM:  GENERAL: NAD, well-developed  HEAD:  Atraumatic, Normocephalic  EYES: EOMI, PERRLA, conjunctiva and sclera clear  NECK: Supple, No JVD  CHEST/LUNG: Clear to auscultation bilaterally; No wheeze  HEART: Regular rate and rhythm; No murmurs, rubs, or gallops  ABDOMEN: Soft, Nontender, Nondistended; Bowel sounds present  EXTREMITIES:  2+ Peripheral Pulses, No clubbing, cyanosis, or edema  PSYCH: AAOx3  NEUROLOGY: non-focal  SKIN: No rashes or lesions    Assessment/Plan: HPI:  Narrative: This is a 70y/o  male with no implantable devices with PMHX of Afib( eliquis ordered not taken yet), HLD, HTN, Type 2 DM uncomplicated compliant with meds last Hgb AIC 6.8 , PVD, Cataracts and right knee pain . Pt had cath in  which was normal . He went into Afib in 2016 and was not on anticoagulation therapy. He had CTA in 2017 which showed a 50-70% proximal stenosis. Pt recommended for Cardiac cath today with Dr. Cabrera today [facility]. Pt has PVD and LE swelling foul smelling small ulcers on foot. Currently CP free no sob no lightheadedness or dizziness noted . Medicine PA Jennifer     QUINCY MELLO  MRN-[id number]  Allergies    No Known Allergies    Intolerances     Called to evaluate patient for chest discomfort. Pt seen and evaluated at bedside. Pt currently complaining of 4-5/10 discomfort in the L. chest wall. Pain is non radiating and nonpleuritic. Pt denies any SOB, extremity pain, N/V/D, abdominal pain, urinary complaints.    Vital Signs Last 24 Hrs  T(C): 36.5 (20 @ 03:34), Max: 37.2 (20 @ 16:27)  T(F): 97.7 (20 @ 03:34), Max: 99 (20 @ 16:27)  HR: 84 (20 @ 03:34) (68 - 84)  BP: 120/69 (20 @ 03:34) (104/62 - 143/71)  BP(mean): 95 (20 @ 16:27) (95 - 95)  RR: 19 (20 @ 03:34) (16 - 19)  SpO2: 97% (20 @ 03:34) (96% - 99%)  Daily Height in cm: 175.26 (2020 16:27)    Daily Weight in k.4 (2020 16:27)  I&O's Summary    2020 07:01  -  2020 05:26  --------------------------------------------------------  IN: 480 mL / OUT: 0 mL / NET: 480 mL      CAPILLARY BLOOD GLUCOSE                          14.9   7.62  )-----------( 183      ( 2020 17:02 )             45.9     -    138  |  94<L>  |  31<H>  ----------------------------<  95  3.9   |  29  |  0.82    Ca    9.6      2020 17:02          PHYSICAL EXAM:  GENERAL: NAD, well-developed  NECK: Supple, No JVD  CHEST/LUNG: Clear to auscultation bilaterally;   HEART: Regular rate and rhythm;   ABDOMEN: Soft, Nontender, Nondistended; Bowel sounds present  EXTREMITIES:  2+ Peripheral Pulses,       Assessment/Plan: HPI:  Narrative: This is a 68y/o  male with no implantable devices with PMHX of Afib( eliquis ordered not taken yet), HLD, HTN, Type 2 DM uncomplicated compliant with meds last Hgb AIC 6.8 , PVD, Cataracts and right knee pain . Pt had cath in  which was normal . He went into Afib in 2016 and was not on anticoagulation therapy. He had CTA in 2017 which showed a 50-70% proximal stenosis. Pt recommended for Cardiac cath today with Dr. Cabrera today [facility]. Pt has PVD and LE swelling foul smelling small ulcers on foot. Now with chest discomfort.      #Chest Discomfort  -EKG performed with no acute changes from previous EKG.  -Trops, cardiac enzymes ordered  -Tylenol given for pain relief  -Continue tele monitoring  -F/u AM labs  -Will endorse to day team in AM.    -Karuna Cameron PA-C, [id number], Dept of Medicine

## 2020-01-18 NOTE — PROVIDER CONTACT NOTE (OTHER) - SITUATION
pt reports feeling dull pressure to his chest 5 scale and feeling "lightheaded" pt also reports a cough which he reports as productive and also reports running nose

## 2020-01-18 NOTE — H&P ADULT - PROBLEM SELECTOR PLAN 2
Pt with pitting edema in lower extremities. Was told to increase lasix to 80mg daily by cardiologist recently although reportedly had bump in renal function. Then changed lasix back to prior dose. Pt with relatively preserved EF on recent TTE. May benefit from more aggressive diuresis if he can tolerate it.  -Will cont. home dose of lasix 40mg daily for now after pt voices concern regarding effect on kidney function  -F/u cardiology recommendations  -F/u BNP

## 2020-01-18 NOTE — CONSULT NOTE ADULT - SUBJECTIVE AND OBJECTIVE BOX
Patient is a 69y old  Male who presents with a chief complaint of Stent placement (18 Jan 2020 05:49)    HPI:  69yM w/ PMHX of Afib(eliquis ordered not taken yet), HLD, HTN, DM2 on insulin for 3 days with reportedly AIC ~7 , PVD, Cataracts and right knee pain presents for monitoring after LHC and stent placement. Pt had cath in 2000 which was normal. He went into Afib in 2016 and was not on anticoagulation therapy. He had CTA around same time which showed a 50-70% proximal stenosis and calcium score >400. Unclear what intervention was taken afterwards. Recently pt started to follow up with MDs regarding his lower extremities and knee pain. Pt states in process of surgical work up for hip replacement his cardiac issues became more apparent. Pt recommended for Cardiac cath today with Dr. Cabrera today [facility]. Pt has PVD and LE swelling small ulcers on foot. Pt underwent LHC and had placement of stents in OM and L prox. Pt had episode of vague L sided chest discomfort not aggravated or improved by known stimuli. Has improved significantly. Also endorses 2 day history of cough with severe sinus congestion associated with worsening green sputum. Endorses some blood on toilet paper after wiping. Has been constipated with hard pellet like stool. Denies fevers, chills, SOB, dizziness, palpitations. ID reportedly called for lower extremity lesions in [facility].     Pt declined oral meds overnight as he took meds in AM. (18 Jan 2020 05:49)    HPI and Hospital course reviewed. 68 y/o M PMHx of Afib, DMII recently started on Insulin, chronic venous stasis, who presented for cardiac angiogram after outpatient work up for hip replacement showed need for cardiac work up. Pt has two stents placed through radial approach and was admitted for post-procedural monitoring. ID was consulted for work up of erythema of LE's.    Pt states he has had swelling of b/l LEs for about one year. Unclear if it has been more red recently, wife thinks so, for which he took one week of keflex as an outpatient. Had several blisters noted, when they ruptured drained serous fluid no purulence. Pt's only other complains is rhinorrhea, sore throat, and cough productive of green sputum for 3 days. Denies fever, chills, diarrhea, dysuria    In the ED, afebrile, no leukocytosis, RVP negative, CXR unremarkable. Pt was started on cefazolin for questionable cellulitis and ID consulted for further work up.     prior hospital charts reviewed [x  ]  primary team notes reviewed [ x ]  other consultant notes reviewed [ x ]  PAST MEDICAL & SURGICAL HISTORY:  CAD (coronary artery disease)  Atrial fibrillation  PVD (peripheral vascular disease)  Diabetes 1.5, managed as type 2  HLD (hyperlipidemia)  HTN (hypertension)  No significant past surgical history    Allergies  No Known Allergies    ANTIMICROBIALS (past 90 days)  MEDICATIONS  (STANDING):    ceFAZolin   IVPB   100 mL/Hr IV Intermittent (01-17-20 @ 18:59)    ceFAZolin   IVPB   100 mL/Hr IV Intermittent (01-18-20 @ 03:36)      ANTIMICROBIALS:    ceFAZolin   IVPB    ceFAZolin   IVPB 1000 every 8 hours    OTHER MEDS: MEDICATIONS  (STANDING):  apixaban 2.5 every 12 hours  aspirin enteric coated 81 daily  atorvastatin 40 at bedtime  dextrose 40% Gel 15 once PRN  dextrose 50% Injectable 12.5 once  dextrose 50% Injectable 25 once  dextrose 50% Injectable 25 once  furosemide    Tablet 40 daily  glucagon  Injectable 1 once PRN  insulin glargine Injectable (LANTUS) 10 every morning  insulin lispro (HumaLOG) corrective regimen sliding scale  three times a day before meals  insulin lispro (HumaLOG) corrective regimen sliding scale  at bedtime  losartan 50 daily  metoprolol succinate ER 50 daily  ticagrelor 90 every 12 hours    SOCIAL HISTORY:  non-smoker, alcohol user, or drug user, works as OB/GYN    FAMILY HISTORY:  FH: congestive heart failure: In Mother    REVIEW OF SYSTEMS  [  ] ROS unobtainable because:    [ x ] All other systems negative except as noted below:	    Constitutional:  [ ] fever [ ] chills  [ ] weight loss  [ ] weakness  Skin:  [ ] rash [ ] phlebitis	  Eyes: [ ] icterus [ ] pain  [ ] discharge	  ENMT: [ ] sore throat  [ ] thrush [ ] ulcers [ ] exudates  Respiratory: [ ] dyspnea [ ] hemoptysis [ x] cough [x ] sputum	  Cardiovascular:  [ ] chest pain [ ] palpitations [ ] edema	  Gastrointestinal:  [ ] nausea [ ] vomiting [ ] diarrhea [ ] constipation [ ] pain	  Genitourinary:  [ ] dysuria [ ] frequency [ ] hematuria [ ] discharge [ ] flank pain  [ ] incontinence  Musculoskeletal:  [ ] myalgias [ ] arthralgias [ ] arthritis  [ ] back pain  Neurological:  [ ] headache [ ] seizures  [ ] confusion/altered mental status  Psychiatric:  [ ] anxiety [ ] depression	  Hematology/Lymphatics:  [ ] lymphadenopathy  Endocrine:  [ ] adrenal [ ] thyroid  Allergic/Immunologic:	 [ ] transplant [ ] seasonal    Vital Signs Last 24 Hrs  T(F): 97.7 (01-18-20 @ 03:34), Max: 99 (01-17-20 @ 16:27)  Vital Signs Last 24 Hrs  HR: 74 (01-18-20 @ 05:50) (68 - 84)  BP: 101/64 (01-18-20 @ 05:50) (101/64 - 143/71)  RR: 19 (01-18-20 @ 03:34)  SpO2: 97% (01-18-20 @ 03:34) (96% - 99%)  Wt(kg): --    PHYSICAL EXAM:  Constitutional: non-toxic, no distress, awake  HEAD/EYES: atraumatic, anicteric, no conjunctival injection  ENT:  supple, no sores, no thrush  Cardiovascular:   normal S1, S2, no murmur, no edema  Respiratory:  possibly decreased breath sounds at right lung base, otherwise clear   GI:  soft, non-tender, normal bowel sounds  :  no joya, no CVA tenderness  Musculoskeletal:  mild pitting edema b/l  Neurologic: awake and alert,  normal strength, no focal findings  Skin:  chronic venous appearing changes, R>L, some blisters  Heme/Onc: no lymphadenopathy   Psychiatric:  awake, alert, appropriate mood                            14.1   8.07  )-----------( 174      ( 18 Jan 2020 06:21 )             43.7     01-18    137  |  98  |  25<H>  ----------------------------<  130<H>  3.9   |  22  |  0.80    Ca    9.4      18 Jan 2020 06:21  Mg     2.0     01-18      MICROBIOLOGY:          Rapid RVP Result: NotDetec (01-18 @ 06:25)      RADIOLOGY:  < from: Xray Chest 2 Views PA/Lat (01.18.20 @ 10:03) >    IMPRESSION: Clear lungs.      < end of copied text >

## 2020-01-18 NOTE — H&P ADULT - PROBLEM SELECTOR PLAN 5
Reportedly A1c ~7. Pt states he was started on levemir 20U 3 days ago which he takes in AM. Reportedly A1c ~7. Pt states he was started on levemir 20U 3 days ago which he takes in AM.  -Will cont. lantus 10U AM for now given change in diet inpatient  -Fingersticks and sliding scale  -F/u A1c Pt with recent lower extremity work up for PAD/PVD with some outpt records in chart. While pt's current lower extremity findings are more suggestive of venous stasis there is concern for infection of his ulcers  -Will cont. IV cefazolin for now  -As per [facility] sign out ID was already called  -See above

## 2020-01-18 NOTE — PROGRESS NOTE ADULT - SUBJECTIVE AND OBJECTIVE BOX
César Dalal MD  Pager [phone]  Spectra [phone]  Cell Phone [phone]    Patient is a 69y old  Male who presents with a chief complaint of Stent placement (18 Jan 2020 11:12)        SUBJECTIVE / OVERNIGHT EVENTS: Patient c/o chest "discomfort" but denies pain. No N/V, SOB, diaphoresis, L arm/shoulder pain.  TELEMETRY: AF 70-90's      MEDICATIONS  (STANDING):  apixaban 2.5 milliGRAM(s) Oral every 12 hours  aspirin enteric coated 81 milliGRAM(s) Oral daily  atorvastatin 40 milliGRAM(s) Oral at bedtime  dextrose 5%. 1000 milliLiter(s) (50 mL/Hr) IV Continuous <Continuous>  dextrose 50% Injectable 12.5 Gram(s) IV Push once  dextrose 50% Injectable 25 Gram(s) IV Push once  dextrose 50% Injectable 25 Gram(s) IV Push once  furosemide    Tablet 40 milliGRAM(s) Oral daily  insulin glargine Injectable (LANTUS) 10 Unit(s) SubCutaneous every morning  insulin lispro (HumaLOG) corrective regimen sliding scale   SubCutaneous three times a day before meals  insulin lispro (HumaLOG) corrective regimen sliding scale   SubCutaneous at bedtime  losartan 50 milliGRAM(s) Oral daily  metoprolol succinate ER 50 milliGRAM(s) Oral daily  ticagrelor 90 milliGRAM(s) Oral every 12 hours    MEDICATIONS  (PRN):  dextrose 40% Gel 15 Gram(s) Oral once PRN Blood Glucose LESS THAN 70 milliGRAM(s)/deciliter  glucagon  Injectable 1 milliGRAM(s) IntraMuscular once PRN Glucose LESS THAN 70 milligrams/deciliter      Vital Signs Last 24 Hrs  T(C): 36.6 (18 Jan 2020 11:59), Max: 37.2 (17 Jan 2020 16:27)  T(F): 97.9 (18 Jan 2020 11:59), Max: 99 (17 Jan 2020 16:27)  HR: 78 (18 Jan 2020 11:59) (68 - 84)  BP: 136/82 (18 Jan 2020 11:59) (101/64 - 143/71)  BP(mean): 95 (17 Jan 2020 16:27) (95 - 95)  RR: 15 (18 Jan 2020 11:59) (15 - 19)  SpO2: 97% (18 Jan 2020 11:59) (96% - 99%)  CAPILLARY BLOOD GLUCOSE      POCT Blood Glucose.: 136 mg/dL (18 Jan 2020 11:43)  POCT Blood Glucose.: 124 mg/dL (18 Jan 2020 07:34)  POCT Blood Glucose.: 119 mg/dL (17 Jan 2020 21:13)    I&O's Summary    17 Jan 2020 07:01  -  18 Jan 2020 07:00  --------------------------------------------------------  IN: 880 mL / OUT: 600 mL / NET: 280 mL    18 Jan 2020 07:01  -  18 Jan 2020 12:35  --------------------------------------------------------  IN: 200 mL / OUT: 0 mL / NET: 200 mL          PHYSICAL EXAM  GENERAL: NAD, well-developed  HEAD:  Atraumatic, Normocephalic  EYES: EOMI, PERRLA, conjunctiva and sclera clear  CHEST/LUNG: Clear to auscultation bilaterally; No wheeze  HEART: Irregularly irregular; No murmurs, rubs, or gallops  ABDOMEN: Soft, Nontender, Nondistended; Bowel sounds present  EXTREMITIES:  2+ pedal edema with chronic venostasis, no erythema. R pretibial bullous lesion; b/l scabbed lesions    LABS:                        14.1   8.07  )-----------( 174      ( 18 Jan 2020 06:21 )             43.7     01-18    137  |  98  |  25<H>  ----------------------------<  130<H>  3.9   |  22  |  0.80    Ca    9.4      18 Jan 2020 06:21  Mg     2.0     01-18      PT/INR - ( 18 Jan 2020 06:21 )   PT: 12.5 sec;   INR: 1.09 ratio         PTT - ( 18 Jan 2020 06:21 )  PTT:31.0 sec  CARDIAC MARKERS ( 18 Jan 2020 06:21 )  x     / x     / x     / x     / 7.2 ng/mL            RADIOLOGY & ADDITIONAL TESTS:    Imaging Personally Reviewed:  Consultant(s) Notes Reviewed:    Care Discussed with Consultants/Other Providers:

## 2020-01-18 NOTE — PROVIDER CONTACT NOTE (OTHER) - BACKGROUND
pt is s/o cardiac cath 1/17/2020 s/p stent to LAD and OM hc of DM, HTN, also with cellulitis to bilat LE

## 2020-01-18 NOTE — H&P ADULT - PROBLEM SELECTOR PLAN 3
Pt endorsing worsening productive cough and sinus congestion. Pulm exam overall reassuring  -Will send RVP  -F/u CXR

## 2020-01-18 NOTE — PROGRESS NOTE ADULT - SUBJECTIVE AND OBJECTIVE BOX
Cardiology Progress Note          Interval Events: s/p PCI/FELA to mLAD and OM1 on 20  Pt transferred to [address] [place] [address] overnight.   C/O intermittent left sided chest discomfort last few minutes which started last night while in bed. Denies associated dyspnea, palpitations.   EKG's with no acute changes.   Troponins trend is flat.      MEDICATIONS:  apixaban 2.5 milliGRAM(s) Oral every 12 hours  aspirin enteric coated 81 milliGRAM(s) Oral daily  furosemide    Tablet 40 milliGRAM(s) Oral daily  losartan 50 milliGRAM(s) Oral daily  metoprolol succinate ER 50 milliGRAM(s) Oral daily  ticagrelor 90 milliGRAM(s) Oral every 12 hours          senna 2 Tablet(s) Oral daily    atorvastatin 40 milliGRAM(s) Oral at bedtime  dextrose 40% Gel 15 Gram(s) Oral once PRN  dextrose 50% Injectable 12.5 Gram(s) IV Push once  dextrose 50% Injectable 25 Gram(s) IV Push once  dextrose 50% Injectable 25 Gram(s) IV Push once  glucagon  Injectable 1 milliGRAM(s) IntraMuscular once PRN  insulin glargine Injectable (LANTUS) 10 Unit(s) SubCutaneous every morning  insulin lispro (HumaLOG) corrective regimen sliding scale   SubCutaneous three times a day before meals  insulin lispro (HumaLOG) corrective regimen sliding scale   SubCutaneous at bedtime    dextrose 5%. 1000 milliLiter(s) IV Continuous <Continuous>        PHYSICAL EXAM:  T(C): 36.6 (20 @ 11:59), Max: 37.2 (20 @ 16:27)  HR: 78 (20 @ 11:59) (68 - 84)  BP: 136/82 (20 @ 11:59) (101/64 - 143/71)  RR: 15 (20 @ 11:59) (15 - 19)  SpO2: 97% (20 @ 11:59) (96% - 99%)  Wt(kg): --  I&O's Summary    2020 07:01  -  2020 07:00  --------------------------------------------------------  IN: 880 mL / OUT: 600 mL / NET: 280 mL    2020 07:01  -  2020 15:49  --------------------------------------------------------  IN: 440 mL / OUT: 0 mL / NET: 440 mL      Daily Height in cm: 175.26 (2020 16:27)    Daily Weight in k.4 (2020 16:27)    Appearance: Normal	  HEENT:   Normal oral mucosa, PERRL, EOMI	  Cardiovascular: irregular, S1 S2, No JVD, No murmurs  Respiratory: Lungs clear to auscultation	  Psychiatry: A & O x 3, anxious  Gastrointestinal:  soft nt nd,   Skin: Erythema to B/L LE with scabbed lesions; No rashes, No ecchymoses, No cyanosis	  Neurologic: grossly nonfocal  Extremities: +2 B/L LE edema  Vascular: right radial site soft, no bleeding or hematoma, +2 radial/ulnar pulses    LABS:	 	    CBC Full  -  ( 2020 06:21 )  WBC Count : 8.07 K/uL  Hemoglobin : 14.1 g/dL  Hematocrit : 43.7 %  Platelet Count - Automated : 174 K/uL  Mean Cell Volume : 94.0 fl  Mean Cell Hemoglobin : 30.3 pg  Mean Cell Hemoglobin Concentration : 32.3 gm/dL  Auto Neutrophil # : 6.02 K/uL  Auto Lymphocyte # : 1.02 K/uL  Auto Monocyte # : 0.86 K/uL  Auto Eosinophil # : 0.10 K/uL  Auto Basophil # : 0.05 K/uL  Auto Neutrophil % : 74.7 %  Auto Lymphocyte % : 12.6 %  Auto Monocyte % : 10.7 %  Auto Eosinophil % : 1.2 %  Auto Basophil % : 0.6 %        137  |  98  |  25<H>  ----------------------------<  130<H>  3.9   |  22  |  0.80  17    138  |  94<L>  |  31<H>  ----------------------------<  95  3.9   |  29  |  0.82    Ca    9.4      2020 06:21  Ca    9.6      2020 17:02  Mg     2.0             proBNP: Serum Pro-Brain Natriuretic Peptide: 326 pg/mL ( @ 06:21)    Lipid Profile:   HgA1c: Hemoglobin A1C, Whole Blood: 7.7 % ( @ 10:54)    TSH:     CARDIAC MARKERS:            TELEMETRY: afib 70-90s, no events 	    ECG: Afib no acute ST, T wave changes 	  RADIOLOGY:  OTHER: 	    PREVIOUS DIAGNOSTIC TESTING:    [ ] Echocardiogram:  [x ] Catheterization:  < from: Cardiac Cath Lab - Adult (20 @ 16:54) >  ORONARY VESSELS: The coronary circulation is right dominant.  LM:   --  LM: Angiography showed minor luminal irregularities with no flow  limiting lesions.  LAD:   --  Mid LAD: There was a 90 % stenosis.  CX:   --  OM1: There was a 90 % stenosis.  RCA:   --  RCA: Angiography showed minor luminal irregularities with no  flow limiting lesions.  COMPLICATIONS: There were no complications.  DIAGNOSTIC RECOMMENDATIONS: ASA and Plavix for 1 year.  INTERVENTIONAL RECOMMENDATIONS: ASA and Plavix for 1 year.  Prepared and signed by  Luigi Cabrera M.D.    < end of copied text >    [ ] Stress Test:

## 2020-01-18 NOTE — H&P ADULT - NSICDXPASTMEDICALHX_GEN_ALL_CORE_FT
PAST MEDICAL HISTORY:  Atrial fibrillation     CAD (coronary artery disease)     Diabetes 1.5, managed as type 2     HLD (hyperlipidemia)     HTN (hypertension)     PVD (peripheral vascular disease)

## 2020-01-18 NOTE — PROVIDER CONTACT NOTE (OTHER) - ACTION/TREATMENT ORDERED:
stat lab orders given , EKG performed and given to provider, po stat meds ordered  continue to monitor

## 2020-01-18 NOTE — H&P ADULT - HISTORY OF PRESENT ILLNESS
69yM w/ PMHX of Afib(eliquis ordered not taken yet), HLD, HTN, DM2 on insulin for 3 days with reportedly AIC ~7 , PVD, Cataracts and right knee pain presents for monitoring after LHC and stent placement. Pt had cath in 2000 which was normal. He went into Afib in 2016 and was not on anticoagulation therapy. He had CTA around same time which showed a 50-70% proximal stenosis and calcium score >400. Unclear what intervention was taken afterwards. Recently pt started to follow up with MDs regarding his lower extremities and knee pain. Pt states in process of surgical work up for hip replacement his cardiac issues became more apparent. Pt recommended for Cardiac cath today with Dr. Cabrera today [facility]. Pt has PVD and LE swelling small ulcers on foot. Pt underwent LHC and had placement of stents in OM and L prox. Pt had episode of vague L sided chest discomfort not aggravated or improved by known stimuli. Has improved significantly. Also endorses 2 day history of cough with severe sinus congestion associated with worsening green sputum. Endorses some blood on toilet paper after wiping. Has been constipated with hard pellet like stool. Denies fevers, chills, SOB, dizziness, palpitations. ID reportedly called for lower extremity lesions in [facility].     Pt declined oral meds overnight as he took meds in AM.

## 2020-01-18 NOTE — PROGRESS NOTE ADULT - PROBLEM SELECTOR PLAN 5
-Cont. lantus 10U AM for now given change in diet inpatient  -Fingersticks and sliding scale  -F/u A1c -Cont. lantus 10U AM for now given change in diet inpatient  -Fingersticks and sliding scale  -HgA1c 7.7

## 2020-01-18 NOTE — H&P ADULT - PROBLEM SELECTOR PLAN 7
-Trend vital signs  -Holding HCTZ for now incase need to increase diuretics  -Cont. Toprol with hold parameters

## 2020-01-18 NOTE — H&P ADULT - PROBLEM SELECTOR PLAN 1
S/p stent placement today. Having some intermittent chest discomfort after. Please reference PA note. Currently relatively chest pain free.  -Will send AM troponin  -Cont. asa and brilinta  -Cont. statin  -Telemetry  -F/u cardiology recommendations S/p stent placement today. Having some intermittent chest discomfort after. Please reference PA note. Currently relatively chest pain free.  -Will send AM troponin  -Cont. asa and brilinta  -Cont. statin  -Telemetry  -F/u cardiology recommendations  -TTE noted

## 2020-01-18 NOTE — H&P ADULT - EXTREMITIES COMMENTS
2+ pitting edema in bilateral lower extremities. Pink skin suggestive of venous stasis. Occasional stuck on lesion and a bullae on RLE medial area of tibia

## 2020-01-18 NOTE — H&P ADULT - PROBLEM SELECTOR PLAN 4
Pt with recent lower extremity work up for PAD/PVD with some outpt records in chart. While pt's current lower extremity findings are more suggestive of venous stasis there is concern for infection of his ulcers  -Will cont. IV cefazolin for now  -As per [facility] sign out ID was already called  -See above CHADSVASC ~4. Pt denies any bleeding hx although had some blood on wiping today after episode of severe constipation  -Cont. previously ordered dose of eliquis for now  -Cont. metoprolol  -Telemetry  -Trend cbc and monitor for bleeding after initiation of eliquis

## 2020-01-18 NOTE — PROVIDER CONTACT NOTE (OTHER) - RECOMMENDATIONS
pt seen by PA and hospitalist team at bedside stat lab orders given , EKG performed and given to provider, po stat meds ordered

## 2020-01-18 NOTE — H&P ADULT - PROBLEM SELECTOR PLAN 6
CHADSVASC ~4. Pt denies any bleeding hx although had some blood on wiping today after episode of severe constipation  -Cont. previously ordered dose of eliquis for now  -Cont. metoprolol  -Telemetry  -Trend cbc and monitor for bleeding after initiation of eliquis Reportedly A1c ~7. Pt states he was started on levemir 20U 3 days ago which he takes in AM.  -Will cont. lantus 10U AM for now given change in diet inpatient  -Fingersticks and sliding scale  -F/u A1c

## 2020-01-18 NOTE — H&P ADULT - ASSESSMENT
69yM w/ PMHX of Afib(eliquis ordered not taken yet), HLD, HTN, DM2 on insulin for 3 days with reportedly AIC ~7 , PVD, Cataracts and right knee pain presents for monitoring after LHC and stent placement.

## 2020-01-18 NOTE — H&P ADULT - NSHPPHYSICALEXAM_GEN_ALL_CORE
Vital Signs Last 24 Hrs  T(C): 36.5 (01-18-20 @ 03:34), Max: 37.2 (01-17-20 @ 16:27)  T(F): 97.7 (01-18-20 @ 03:34), Max: 99 (01-17-20 @ 16:27)  HR: 74 (01-18-20 @ 05:50) (68 - 84)  BP: 101/64 (01-18-20 @ 05:50) (101/64 - 143/71)  BP(mean): 95 (01-17-20 @ 16:27) (95 - 95)  RR: 19 (01-18-20 @ 03:34) (16 - 19)  SpO2: 97% (01-18-20 @ 03:34) (96% - 99%)

## 2020-01-18 NOTE — PROGRESS NOTE ADULT - PROBLEM SELECTOR PLAN 8
1.  Name of PCP: Dr. [facility]  2.  PCP Contacted on Admission: [ ] Y    [x ] N    3.  PCP contacted at Discharge: [ ] Y    [ ] N    [ ] N/A  4.  Post-Discharge Appointment Date and Location:  5.  Summary of Handoff given to PCP:    Likely d/c home today pending cardiology followup and clearance. 45 minutes spent discharging the patient.

## 2020-01-18 NOTE — PROGRESS NOTE ADULT - ASSESSMENT
69yM w/ PMHX of Afib, HLD, HTN, DM type 2, PVD, cataracts and right knee pain presents for monitoring after LHC with two stents placed.

## 2020-01-19 ENCOUNTER — TRANSCRIPTION ENCOUNTER (OUTPATIENT)
Age: 70
End: 2020-01-19

## 2020-01-19 VITALS
RESPIRATION RATE: 17 BRPM | HEART RATE: 80 BPM | DIASTOLIC BLOOD PRESSURE: 68 MMHG | OXYGEN SATURATION: 98 % | SYSTOLIC BLOOD PRESSURE: 110 MMHG

## 2020-01-19 DIAGNOSIS — M62.838 OTHER MUSCLE SPASM: ICD-10-CM

## 2020-01-19 LAB
ANION GAP SERPL CALC-SCNC: 13 MMOL/L — SIGNIFICANT CHANGE UP (ref 5–17)
BUN SERPL-MCNC: 21 MG/DL — SIGNIFICANT CHANGE UP (ref 7–23)
CALCIUM SERPL-MCNC: 9.7 MG/DL — SIGNIFICANT CHANGE UP (ref 8.4–10.5)
CHLORIDE SERPL-SCNC: 102 MMOL/L — SIGNIFICANT CHANGE UP (ref 96–108)
CO2 SERPL-SCNC: 24 MMOL/L — SIGNIFICANT CHANGE UP (ref 22–31)
CREAT SERPL-MCNC: 0.77 MG/DL — SIGNIFICANT CHANGE UP (ref 0.5–1.3)
GLUCOSE BLDC GLUCOMTR-MCNC: 134 MG/DL — HIGH (ref 70–99)
GLUCOSE BLDC GLUCOMTR-MCNC: 150 MG/DL — HIGH (ref 70–99)
GLUCOSE SERPL-MCNC: 141 MG/DL — HIGH (ref 70–99)
POTASSIUM SERPL-MCNC: 4 MMOL/L — SIGNIFICANT CHANGE UP (ref 3.5–5.3)
POTASSIUM SERPL-SCNC: 4 MMOL/L — SIGNIFICANT CHANGE UP (ref 3.5–5.3)
SODIUM SERPL-SCNC: 139 MMOL/L — SIGNIFICANT CHANGE UP (ref 135–145)
TROPONIN T, HIGH SENSITIVITY RESULT: 41 NG/L — SIGNIFICANT CHANGE UP (ref 0–51)

## 2020-01-19 PROCEDURE — 93010 ELECTROCARDIOGRAM REPORT: CPT

## 2020-01-19 PROCEDURE — C1894: CPT

## 2020-01-19 PROCEDURE — 85730 THROMBOPLASTIN TIME PARTIAL: CPT

## 2020-01-19 PROCEDURE — 93005 ELECTROCARDIOGRAM TRACING: CPT

## 2020-01-19 PROCEDURE — 99239 HOSP IP/OBS DSCHRG MGMT >30: CPT

## 2020-01-19 PROCEDURE — 87581 M.PNEUMON DNA AMP PROBE: CPT

## 2020-01-19 PROCEDURE — 87633 RESP VIRUS 12-25 TARGETS: CPT

## 2020-01-19 PROCEDURE — 85610 PROTHROMBIN TIME: CPT

## 2020-01-19 PROCEDURE — 83735 ASSAY OF MAGNESIUM: CPT

## 2020-01-19 PROCEDURE — 80048 BASIC METABOLIC PNL TOTAL CA: CPT

## 2020-01-19 PROCEDURE — 85027 COMPLETE CBC AUTOMATED: CPT

## 2020-01-19 PROCEDURE — 99152 MOD SED SAME PHYS/QHP 5/>YRS: CPT

## 2020-01-19 PROCEDURE — 84484 ASSAY OF TROPONIN QUANT: CPT

## 2020-01-19 PROCEDURE — 71046 X-RAY EXAM CHEST 2 VIEWS: CPT

## 2020-01-19 PROCEDURE — C1874: CPT

## 2020-01-19 PROCEDURE — C9600: CPT | Mod: LC

## 2020-01-19 PROCEDURE — 87798 DETECT AGENT NOS DNA AMP: CPT

## 2020-01-19 PROCEDURE — 82553 CREATINE MB FRACTION: CPT

## 2020-01-19 PROCEDURE — 93458 L HRT ARTERY/VENTRICLE ANGIO: CPT | Mod: 59

## 2020-01-19 PROCEDURE — C8929: CPT

## 2020-01-19 PROCEDURE — 83036 HEMOGLOBIN GLYCOSYLATED A1C: CPT

## 2020-01-19 PROCEDURE — 83880 ASSAY OF NATRIURETIC PEPTIDE: CPT

## 2020-01-19 PROCEDURE — C1887: CPT

## 2020-01-19 PROCEDURE — 80061 LIPID PANEL: CPT

## 2020-01-19 PROCEDURE — 82962 GLUCOSE BLOOD TEST: CPT

## 2020-01-19 PROCEDURE — 99232 SBSQ HOSP IP/OBS MODERATE 35: CPT | Mod: GC

## 2020-01-19 PROCEDURE — 87486 CHLMYD PNEUM DNA AMP PROBE: CPT

## 2020-01-19 PROCEDURE — C1769: CPT

## 2020-01-19 RX ORDER — CLOPIDOGREL BISULFATE 75 MG/1
1 TABLET, FILM COATED ORAL
Qty: 90 | Refills: 2
Start: 2020-01-19 | End: 2020-10-14

## 2020-01-19 RX ORDER — APIXABAN 2.5 MG/1
1 TABLET, FILM COATED ORAL
Qty: 180 | Refills: 2
Start: 2020-01-19 | End: 2020-10-14

## 2020-01-19 RX ORDER — CYCLOBENZAPRINE HYDROCHLORIDE 10 MG/1
1 TABLET, FILM COATED ORAL
Qty: 90 | Refills: 0
Start: 2020-01-19 | End: 2020-02-17

## 2020-01-19 RX ORDER — ASPIRIN/CALCIUM CARB/MAGNESIUM 324 MG
1 TABLET ORAL
Qty: 90 | Refills: 2
Start: 2020-01-19 | End: 2020-10-14

## 2020-01-19 RX ORDER — CYCLOBENZAPRINE HYDROCHLORIDE 10 MG/1
5 TABLET, FILM COATED ORAL THREE TIMES A DAY
Refills: 0 | Status: DISCONTINUED | OUTPATIENT
Start: 2020-01-19 | End: 2020-01-19

## 2020-01-19 RX ORDER — CYCLOBENZAPRINE HYDROCHLORIDE 10 MG/1
5 TABLET, FILM COATED ORAL ONCE
Refills: 0 | Status: COMPLETED | OUTPATIENT
Start: 2020-01-19 | End: 2020-01-19

## 2020-01-19 RX ORDER — APIXABAN 2.5 MG/1
1 TABLET, FILM COATED ORAL
Qty: 0 | Refills: 0 | DISCHARGE

## 2020-01-19 RX ORDER — BACITRACIN ZINC 500 UNIT/G
1 OINTMENT IN PACKET (EA) TOPICAL ONCE
Refills: 0 | Status: DISCONTINUED | OUTPATIENT
Start: 2020-01-19 | End: 2020-01-19

## 2020-01-19 RX ADMIN — SENNA PLUS 2 TABLET(S): 8.6 TABLET ORAL at 11:55

## 2020-01-19 RX ADMIN — LOSARTAN POTASSIUM 50 MILLIGRAM(S): 100 TABLET, FILM COATED ORAL at 11:55

## 2020-01-19 RX ADMIN — Medication 81 MILLIGRAM(S): at 11:55

## 2020-01-19 RX ADMIN — APIXABAN 2.5 MILLIGRAM(S): 2.5 TABLET, FILM COATED ORAL at 06:31

## 2020-01-19 RX ADMIN — Medication 40 MILLIGRAM(S): at 06:30

## 2020-01-19 RX ADMIN — INSULIN GLARGINE 10 UNIT(S): 100 INJECTION, SOLUTION SUBCUTANEOUS at 08:05

## 2020-01-19 RX ADMIN — Medication 50 MILLIGRAM(S): at 06:31

## 2020-01-19 RX ADMIN — CYCLOBENZAPRINE HYDROCHLORIDE 5 MILLIGRAM(S): 10 TABLET, FILM COATED ORAL at 12:41

## 2020-01-19 RX ADMIN — TICAGRELOR 90 MILLIGRAM(S): 90 TABLET ORAL at 06:30

## 2020-01-19 NOTE — DISCHARGE NOTE PROVIDER - CARE PROVIDER_API CALL
Luigi Cabrera)  Cardiovascular Disease; Interventional Cardiology  [address] [place] [address]  Phone: [phone]  Fax: [phone]  Established Patient  Follow Up Time: 1 week

## 2020-01-19 NOTE — DISCHARGE NOTE PROVIDER - NSDCMRMEDTOKEN_GEN_ALL_CORE_FT
aspirin 81 mg oral delayed release tablet: 1 tab(s) orally once a day  Crestor 10 mg oral tablet: 1 tab(s) orally once a day  cyclobenzaprine 5 mg oral tablet: 1 tab(s) orally 3 times a day, As needed, Muscle Spasm  Eliquis 5 mg oral tablet: 1 tab(s) orally 2 times a day   hydroCHLOROthiazide 12.5 mg oral tablet: 1 tab(s) orally once a day  Lasix 40 mg oral tablet: 1 tab(s) orally once a day  Levemir 100 units/mL subcutaneous solution: 20 unit(s) subcutaneous once a day  losartan 50 mg oral tablet: 1 tab(s) orally once a day  metoprolol succinate 50 mg oral tablet, extended release: 1 tab(s) orally once a day  Plavix 75 mg oral tablet: 1 tab(s) orally once a day

## 2020-01-19 NOTE — DISCHARGE NOTE PROVIDER - NSDCCAREPROVSEEN_GEN_ALL_CORE_FT
[facility] Medicine, Advance PracticeTeam  [facility] Cardiology, Consults Teaching Sv  [facility] Cardiology, CATH Service

## 2020-01-19 NOTE — DISCHARGE NOTE PROVIDER - NSFOLLOWUPCLINICS_GEN_ALL_ED_FT
[facility] Dermatolgy  Dermatology  [address], [address]  [place] [address]  Phone: [phone]  Fax:   Follow Up Time: 1 week

## 2020-01-19 NOTE — DISCHARGE NOTE PROVIDER - HOSPITAL COURSE
68y/o male with PMHX of Afib, HLD, HTN, Type 2 DM uncomplicated compliant with meds last Hgb AIC 6.8 , PVD, Cataracts and right knee pain-sent in for cardiac cath with Dr. Cabrera today [place], admitted for further observation s/p cath. During hospital course, pt underwent PCI/FELA to mLAD and OM1 on 1/17/20, subsequently developed intermittent chest pressure (Trops 55->52->41, with no EKG changes, no ectopies on telemetry), and was also found with b/l LE erythema. Pt seen by ID for initial concern for cellulitis, however physical exam not consistent with cellulitis, and likely chronic venous stasis. Given bullae & lesions, outpatient Dermatology appointment was scheduled, and b/l LE dopplers ordered for outpatient evaluation for DVT. Pt remains on triple therapy--anticoagulated on Eliquis, and is being discharged home on ASA & Plavix, with plan to f/u with Cardiologist & PCP in 1-2 weeks.

## 2020-01-19 NOTE — PROGRESS NOTE ADULT - ASSESSMENT
69 year old M patient w/ PMH of AF, HTN, HLD, DM2, and PVD presented for C. Pt had normal cath in 2000. He went into AF in 2016 and was not on anticoagulation therapy. He had CTA around that time which showed 50-70% proximal stenosis and calcium score >400. Unclear what intervention was taken afterwards. Recently pt started to follow up regarding his lower extremities and knee pain. Pt states in process of surgical work up for hip replacement, his cardiac issues became more apparent. Pt recommended for Cardiac cath with Dr. Cabrera. Pt underwent LHC and had placement of stents in OM and L prox.     - clinically stable with minimal chest discomfort       s/p PCI/FELA to mLAD and OM1 on 1/17/20  -C/O intermittent left sided chest discomfort last few minutes which started last night while in bed. Denies associated dyspnea, palpitations.   -EKG's with no acute changes  -VS stable per flowsheet  -Troponins trend is flat, may check 3rd set CE's  -Currently pain free. Pt instructed to notify MD if symptoms persist or worsen or new s/s develop.  -Cards consult to follow   -Continue ASA/Plavix/statin s/p PCI  -Continue Eliquis for Afib  -Discharge teaching reviewed with pt including risks of triple therapy  -All other care per primary team 69 year old M patient w/ PMH of AF, HTN, HLD, DM2, and PVD presented for LHC. Pt had normal cath in 2000. He went into AF in 2016 and was not on anticoagulation therapy. He had CTA around that time which showed 50-70% proximal stenosis and calcium score >400. Unclear what intervention was taken afterwards. Recently pt started to follow up regarding his lower extremities and knee pain. Pt states in process of surgical work up for hip replacement, his cardiac issues became more apparent. Pt recommended for Cardiac cath with Dr. Cabrera. Pt underwent LHC and had placement of stents in OM and L prox.     - clinically stable with minimal chest discomfort   - trop downtrended, 41  - telemetry reviewed, rate-controlled AF  - currently on triple therapy with aspirin, brilinta, and eliquis 2.5mg BID  - load plavix 600mg 24 hours after last brilinta, approx 6:30AM on 1/20 followed by maintenance 75mg daily  - continue eliquis at age-appropriate dose 5mg BID  - discontinue aspirin for elevated bleeding complications with triple therapy  - continue current guideline directed medical therapy as BP and HR tolerated  - will continue to follow, please call with further questions    Severiano Lopez, #[phone]  Cardiology Fellow

## 2020-01-19 NOTE — PROGRESS NOTE ADULT - SUBJECTIVE AND OBJECTIVE BOX
Cardiology Progress Note    Interval: Pt resting comfortably in a chair. Noted mild chest discomfort but otherwise feeling much better since LHC/PCI. Endorsed persistent LE swelling, which he states looks and feels much improved this morning.    Tele: rate-controlled atrial fibrillation    Medications:  apixaban 2.5 milliGRAM(s) Oral every 12 hours  aspirin enteric coated 81 milliGRAM(s) Oral daily  atorvastatin 40 milliGRAM(s) Oral at bedtime  dextrose 40% Gel 15 Gram(s) Oral once PRN  dextrose 5%. 1000 milliLiter(s) IV Continuous <Continuous>  dextrose 50% Injectable 12.5 Gram(s) IV Push once  dextrose 50% Injectable 25 Gram(s) IV Push once  dextrose 50% Injectable 25 Gram(s) IV Push once  furosemide    Tablet 40 milliGRAM(s) Oral daily  glucagon  Injectable 1 milliGRAM(s) IntraMuscular once PRN  insulin glargine Injectable (LANTUS) 10 Unit(s) SubCutaneous every morning  insulin lispro (HumaLOG) corrective regimen sliding scale   SubCutaneous three times a day before meals  insulin lispro (HumaLOG) corrective regimen sliding scale   SubCutaneous at bedtime  losartan 50 milliGRAM(s) Oral daily  metoprolol succinate ER 50 milliGRAM(s) Oral daily  senna 2 Tablet(s) Oral daily  ticagrelor 90 milliGRAM(s) Oral every 12 hours      Review of Systems:  Constitutional: [ ] Fever [ ] Chills [ ] Fatigue [ ] Weight change   HEENT: [ ] Blurred vision [ ] Eye Pain [ ] Headache [ ] Runny nose [ ] Sore Throat   Respiratory: [ ] Cough [ ] Wheezing [ ] Shortness of breath  Cardiovascular: [ ] Chest Pain [ ] Palpitations [ ] DEMARCO [ ] PND [ ] Orthopnea  Gastrointestinal: [ ] Abdominal Pain [ ] Diarrhea [ ] Constipation [ ] Hemorrhoids [ ] Nausea [ ] Vomiting  Genitourinary: [ ] Nocturia [ ] Dysuria [ ] Incontinence  Extremities: [ ] Swelling [ ] Joint Pain  Neurologic: [ ] Focal deficit [ ] Paresthesias [ ] Syncope  Lymphatic: [ ] Swelling [ ] Lymphadenopathy   Skin: [ ] Rash [ ] Ecchymoses [ ] Wounds [ ] Lesions  Psychiatry: [ ] Depression [ ] Suicidal/Homicidal Ideation [ ] Anxiety [ ] Sleep Disturbances  [ ] 10 point review of systems is otherwise negative except as mentioned above            [ ]Unable to obtain    Vitals:  T(C): 37.1 (20 @ 06:27), Max: 37.1 (20 @ 06:27)  HR: 80 (20 @ 06:27) (78 - 80)  BP: 134/78 (20 @ 06:27) (116/72 - 136/82)  BP(mean): --  RR: 18 (20 @ 06:27) (15 - 18)  SpO2: 96% (20 @ 06:27) (96% - 97%)  Wt(kg): --  Daily     Daily Weight in k.3 (2020 06:40)  I&O's Summary    2020 07:01  -  2020 07:00  --------------------------------------------------------  IN: 980 mL / OUT: 0 mL / NET: 980 mL    2020 07:01  -  2020 10:10  --------------------------------------------------------  IN: 240 mL / OUT: 0 mL / NET: 240 mL        Physical Exam:  General: NAD  Eye: PERRL, EOMI  HENT: Normal oral mucosa NC/AT  CV: Irregular, No M/R/G, trace edema, no elevation in JVP  Resp: Normal respiratory effort, clear to auscultation bilaterally, no wheezing, no crackles  Abd: Soft, Non-tender, Non-distended, BS+  Ext: No clubbing, No joint deformity, swelling and erythema noted on bilateral LE, R foot cold to touch   Neuro: Non-focal, motor and sensory intact  Lymph: No lymphadenopathy  Psych: AAOx3, Mood & affect appropriate  Skin: No rashes, No ecchymoses, No cyanosis    Labs:                        14.1   8.07  )-----------( 174      ( 2020 06:21 )             43.7         139  |  102  |  21  ----------------------------<  141<H>  4.0   |  24  |  0.77    Ca    9.7      2020 06:29  Mg     2.0           PT/INR - ( 2020 06:21 )   PT: 12.5 sec;   INR: 1.09 ratio         PTT - ( 2020 06:21 )  PTT:31.0 sec  CARDIAC MARKERS ( 2020 06:21 )  x     / x     / x     / x     / 7.2 ng/mL      Serum Pro-Brain Natriuretic Peptide: 326 pg/mL ( @ 06:21)      Hemoglobin A1C, Whole Blood: 7.7 % ( @ 10:54)      New results/imaging:

## 2020-01-19 NOTE — PROGRESS NOTE ADULT - PROBLEM SELECTOR PLAN 5
-Cont. lantus 10U AM for now given change in diet inpatient  -Fingersticks and sliding scale  -HgA1c 7.7

## 2020-01-19 NOTE — DISCHARGE NOTE NURSING/CASE MANAGEMENT/SOCIAL WORK - PATIENT PORTAL LINK FT
You can access the FollowMyHealth Patient Portal offered by [facility] by registering at the following website: [email or web address]. By joining [facility]’s FollowMyHealth portal, you will also be able to view your health information using other applications (apps) compatible with our system.

## 2020-01-19 NOTE — CHART NOTE - NSCHARTNOTEFT_GEN_A_CORE
LILLIEJIGNESH ETIENNECARMENCITA  69y Male  Patient is a 69y old  Male who presents with a chief complaint of Stent placement (19 Jan 2020 11:35)     Event Summary:  Patient's AC was changed to Xarelto due to insurance coverage. Pt educated on medication change--new prescription sent to [facility].    Nathaniel Saintus [facility]  #[phone]

## 2020-01-19 NOTE — PROGRESS NOTE ADULT - PROBLEM SELECTOR PLAN 9
1.  Name of PCP: Dr. Stewart [facility]  2.  PCP Contacted on Admission: [ ] Y    [x ] N    3.  PCP contacted at Discharge: [ ] Y    [ ] N    [ ] N/A  4.  Post-Discharge Appointment Date and Location:  5.  Summary of Handoff given to PCP:    D/c home today. 45 minutes spent discharging the patient.

## 2020-01-19 NOTE — PROGRESS NOTE ADULT - SUBJECTIVE AND OBJECTIVE BOX
César Dalal MD  Pager [phone]  Spectra [phone]  Cell Phone [phone]    Patient is a 69y old  Male who presents with a chief complaint of Stent placement (19 Jan 2020 10:10)        SUBJECTIVE / OVERNIGHT EVENTS: Patient feels well. Denies CP/SOB. C/o L neck pain in muscle, requesting Flexeril.   TELEMETRY: AF 70-80's      MEDICATIONS  (STANDING):  apixaban 2.5 milliGRAM(s) Oral every 12 hours  aspirin enteric coated 81 milliGRAM(s) Oral daily  atorvastatin 40 milliGRAM(s) Oral at bedtime  cyclobenzaprine 5 milliGRAM(s) Oral once  dextrose 5%. 1000 milliLiter(s) (50 mL/Hr) IV Continuous <Continuous>  dextrose 50% Injectable 12.5 Gram(s) IV Push once  dextrose 50% Injectable 25 Gram(s) IV Push once  dextrose 50% Injectable 25 Gram(s) IV Push once  furosemide    Tablet 40 milliGRAM(s) Oral daily  insulin glargine Injectable (LANTUS) 10 Unit(s) SubCutaneous every morning  insulin lispro (HumaLOG) corrective regimen sliding scale   SubCutaneous three times a day before meals  insulin lispro (HumaLOG) corrective regimen sliding scale   SubCutaneous at bedtime  losartan 50 milliGRAM(s) Oral daily  metoprolol succinate ER 50 milliGRAM(s) Oral daily  senna 2 Tablet(s) Oral daily  ticagrelor 90 milliGRAM(s) Oral every 12 hours    MEDICATIONS  (PRN):  cyclobenzaprine 5 milliGRAM(s) Oral three times a day PRN Muscle Spasm  dextrose 40% Gel 15 Gram(s) Oral once PRN Blood Glucose LESS THAN 70 milliGRAM(s)/deciliter  glucagon  Injectable 1 milliGRAM(s) IntraMuscular once PRN Glucose LESS THAN 70 milligrams/deciliter      Vital Signs Last 24 Hrs  T(C): 37.1 (19 Jan 2020 06:27), Max: 37.1 (19 Jan 2020 06:27)  T(F): 98.8 (19 Jan 2020 06:27), Max: 98.8 (19 Jan 2020 06:27)  HR: 80 (19 Jan 2020 06:27) (78 - 80)  BP: 134/78 (19 Jan 2020 06:27) (116/72 - 136/82)  BP(mean): --  RR: 18 (19 Jan 2020 06:27) (15 - 18)  SpO2: 96% (19 Jan 2020 06:27) (96% - 97%)  CAPILLARY BLOOD GLUCOSE      POCT Blood Glucose.: 150 mg/dL (19 Jan 2020 07:37)  POCT Blood Glucose.: 122 mg/dL (18 Jan 2020 21:15)  POCT Blood Glucose.: 129 mg/dL (18 Jan 2020 16:16)  POCT Blood Glucose.: 136 mg/dL (18 Jan 2020 11:43)    I&O's Summary    18 Jan 2020 07:01  -  19 Jan 2020 07:00  --------------------------------------------------------  IN: 980 mL / OUT: 0 mL / NET: 980 mL    19 Jan 2020 07:01  -  19 Jan 2020 11:19  --------------------------------------------------------  IN: 240 mL / OUT: 0 mL / NET: 240 mL          PHYSICAL EXAM  GENERAL: NAD, well-developed  HEAD:  Atraumatic, Normocephalic  EYES: EOMI, PERRLA, conjunctiva and sclera clear  CHEST/LUNG: Clear to auscultation bilaterally; No wheeze  HEART: Irregularly irregular; No murmurs, rubs, or gallops  ABDOMEN: Soft, Nontender, Nondistended; Bowel sounds present  EXTREMITIES:  1-2+ pedal edema with chronic venostasis, no erythema. R pretibial bullous lesion; b/l scabbed lesions    LABS:                        14.1   8.07  )-----------( 174      ( 18 Jan 2020 06:21 )             43.7     01-19    139  |  102  |  21  ----------------------------<  141<H>  4.0   |  24  |  0.77    Ca    9.7      19 Jan 2020 06:29  Mg     2.0     01-18      PT/INR - ( 18 Jan 2020 06:21 )   PT: 12.5 sec;   INR: 1.09 ratio         PTT - ( 18 Jan 2020 06:21 )  PTT:31.0 sec  CARDIAC MARKERS ( 18 Jan 2020 06:21 )  x     / x     / x     / x     / 7.2 ng/mL            RADIOLOGY & ADDITIONAL TESTS:    Imaging Personally Reviewed:  Consultant(s) Notes Reviewed:    Care Discussed with Consultants/Other Providers:

## 2020-01-19 NOTE — DISCHARGE NOTE PROVIDER - NSDCCPCAREPLAN_GEN_ALL_CORE_FT
PRINCIPAL DISCHARGE DIAGNOSIS  Diagnosis: Stented coronary artery  Assessment and Plan of Treatment: s/p PCI/FELA to mLAD and OM1 on 1/17/20  Continue with ASA & Plavix  Low sodium diet, c/w anti-hypertensives & statin  Follow-up with your Cardiologist in 1- 2 weeks      SECONDARY DISCHARGE DIAGNOSES  Diagnosis: Chronic atrial fibrillation  Assessment and Plan of Treatment: Eliquis was increased to 5mg BID as per Cardiology  Follow-up with your Cardiologist in 1-2 weeks  Atrial fibrillation is the most common heart rhythm problem.  The condition puts you at risk for has stroke and heart attack  It helps if you control your blood pressure, not drink more than 1-2 alcohol drinks per day, cut down on caffeine, getting treatment for over active thyroid gland, and get regular exercise  Call your doctor if you feel your heart racing or beating unusually, chest tightness or pain, lightheaded, faint, shortness of breath especially with exercise  It is important to take your heart medication as prescribed    Diagnosis: Lower extremity edema  Assessment and Plan of Treatment: Physical exam not consistent with cellulitis, low suspicion for bullous pemphigoid-Dermatology consulted and will be calling you for outpatient follow-up  Keep legs elevated and constricted-adhere to low salt diet  You were provided with prescription for outpatient lower ext doppler  Please follow-up results with PCP, continue your anticoagulation as prescribed    Diagnosis: Essential hypertension  Assessment and Plan of Treatment: Low salt diet  Activity as tolerated.  Take all medication as prescribed.  Follow up with your medical doctor for routine blood pressure monitoring at your next visit.  Notify your doctor if you have any of the following symptoms:   Dizziness, Lightheadedness, Blurry vision, Headache, Chest pain, Shortness of breath    Diagnosis: Muscle spasm  Assessment and Plan of Treatment: Continue with Flexeril 5mg PO every 8 hours as needed for muscle spasms  Warm compress to help relax muscles  Follow-up with your PCP in 1-2 weeks

## 2020-01-19 NOTE — PROGRESS NOTE ADULT - PROBLEM SELECTOR PLAN 8
On Eliquis- concern for unilateral LE edema and possible DVT- patient already on treatment. Given prescription for LE doppler as outpatient.

## 2020-01-19 NOTE — PROGRESS NOTE ADULT - ATTENDING COMMENTS
I interviewed and examined Dr. Burgess.  Status-post recent PCI/stent on DAPT and AC for AF.  No significant chest pain reported at the time of my evaluation at the bedside.  Gail MARINELLI noted and MARY MARTINES recommended.

## 2020-01-21 PROBLEM — I25.10 ATHEROSCLEROTIC HEART DISEASE OF NATIVE CORONARY ARTERY WITHOUT ANGINA PECTORIS: Chronic | Status: ACTIVE | Noted: 2020-01-17

## 2020-01-21 PROBLEM — E13.9 OTHER SPECIFIED DIABETES MELLITUS WITHOUT COMPLICATIONS: Chronic | Status: ACTIVE | Noted: 2020-01-17

## 2020-01-21 PROBLEM — I48.91 UNSPECIFIED ATRIAL FIBRILLATION: Chronic | Status: ACTIVE | Noted: 2020-01-17

## 2020-01-21 PROBLEM — I10 ESSENTIAL (PRIMARY) HYPERTENSION: Chronic | Status: ACTIVE | Noted: 2020-01-17

## 2020-01-21 PROBLEM — E78.5 HYPERLIPIDEMIA, UNSPECIFIED: Chronic | Status: ACTIVE | Noted: 2020-01-17

## 2020-01-21 PROBLEM — I73.9 PERIPHERAL VASCULAR DISEASE, UNSPECIFIED: Chronic | Status: ACTIVE | Noted: 2020-01-17

## 2020-01-21 LAB — GLUCOSE BLDC GLUCOMTR-MCNC: 88 MG/DL — SIGNIFICANT CHANGE UP (ref 70–99)

## 2020-01-21 RX ORDER — APIXABAN 2.5 MG/1
2.5 TABLET, FILM COATED ORAL
Qty: 180 | Refills: 3 | Status: DISCONTINUED | COMMUNITY
Start: 2020-01-15 | End: 2020-01-21

## 2020-02-19 ENCOUNTER — APPOINTMENT (OUTPATIENT)
Dept: CARDIOLOGY | Facility: CLINIC | Age: 70
End: 2020-02-19

## 2020-08-26 ENCOUNTER — APPOINTMENT (OUTPATIENT)
Dept: CARDIOLOGY | Facility: CLINIC | Age: 70
End: 2020-08-26

## 2021-03-15 NOTE — H&P ADULT - PROBLEM SELECTOR PLAN 9
Patient's mother aware of results, has no further questions or concerns.   1.  Name of PCP: Dr. [facility]  2.  PCP Contacted on Admission: [ ] Y    [x ] N    3.  PCP contacted at Discharge: [ ] Y    [ ] N    [ ] N/A  4.  Post-Discharge Appointment Date and Location:  5.  Summary of Handoff given to PCP:

## 2021-06-22 ENCOUNTER — APPOINTMENT (OUTPATIENT)
Dept: OPHTHALMOLOGY | Facility: CLINIC | Age: 71
End: 2021-06-22
Payer: MEDICARE

## 2021-06-22 ENCOUNTER — NON-APPOINTMENT (OUTPATIENT)
Age: 71
End: 2021-06-22

## 2021-06-22 PROCEDURE — 92136 OPHTHALMIC BIOMETRY: CPT

## 2021-06-22 PROCEDURE — 92025 CPTRIZED CORNEAL TOPOGRAPHY: CPT

## 2021-06-22 PROCEDURE — 92004 COMPRE OPH EXAM NEW PT 1/>: CPT

## 2021-06-22 PROCEDURE — 76512 OPH US DX B-SCAN: CPT | Mod: LT

## 2021-08-12 ENCOUNTER — APPOINTMENT (OUTPATIENT)
Dept: OPHTHALMOLOGY | Facility: CLINIC | Age: 71
End: 2021-08-12

## 2021-08-17 ENCOUNTER — APPOINTMENT (OUTPATIENT)
Dept: OPHTHALMOLOGY | Facility: CLINIC | Age: 71
End: 2021-08-17

## 2021-09-29 ENCOUNTER — APPOINTMENT (OUTPATIENT)
Dept: OPHTHALMOLOGY | Facility: AMBULATORY SURGERY CENTER | Age: 71
End: 2021-09-29

## 2021-09-30 ENCOUNTER — APPOINTMENT (OUTPATIENT)
Dept: OPHTHALMOLOGY | Facility: CLINIC | Age: 71
End: 2021-09-30

## 2021-10-05 ENCOUNTER — APPOINTMENT (OUTPATIENT)
Dept: OPHTHALMOLOGY | Facility: CLINIC | Age: 71
End: 2021-10-05

## 2021-10-06 PROBLEM — I10 ESSENTIAL HYPERTENSION: Status: ACTIVE | Noted: 2020-01-15

## 2021-11-12 ENCOUNTER — APPOINTMENT (OUTPATIENT)
Dept: OPHTHALMOLOGY | Facility: CLINIC | Age: 71
End: 2021-11-12
Payer: MEDICARE

## 2021-11-12 ENCOUNTER — NON-APPOINTMENT (OUTPATIENT)
Age: 71
End: 2021-11-12

## 2021-11-12 PROCEDURE — 92012 INTRM OPH EXAM EST PATIENT: CPT

## 2021-11-12 PROCEDURE — 92136 OPHTHALMIC BIOMETRY: CPT

## 2021-12-22 ENCOUNTER — APPOINTMENT (OUTPATIENT)
Dept: OPHTHALMOLOGY | Facility: AMBULATORY SURGERY CENTER | Age: 71
End: 2021-12-22

## 2021-12-23 ENCOUNTER — APPOINTMENT (OUTPATIENT)
Dept: OPHTHALMOLOGY | Facility: CLINIC | Age: 71
End: 2021-12-23

## 2021-12-28 ENCOUNTER — APPOINTMENT (OUTPATIENT)
Dept: OPHTHALMOLOGY | Facility: CLINIC | Age: 71
End: 2021-12-28

## 2022-02-18 ENCOUNTER — APPOINTMENT (OUTPATIENT)
Dept: OPHTHALMOLOGY | Facility: CLINIC | Age: 72
End: 2022-02-18
Payer: MEDICARE

## 2022-02-18 ENCOUNTER — NON-APPOINTMENT (OUTPATIENT)
Age: 72
End: 2022-02-18

## 2022-02-18 PROCEDURE — 92012 INTRM OPH EXAM EST PATIENT: CPT

## 2022-02-18 PROCEDURE — 76512 OPH US DX B-SCAN: CPT | Mod: LT

## 2022-03-20 ENCOUNTER — INPATIENT (INPATIENT)
Facility: HOSPITAL | Age: 72
LOS: 2 days | Discharge: HOME | DRG: 65 | End: 2022-03-23
Attending: INTERNAL MEDICINE | Admitting: INTERNAL MEDICINE
Payer: MEDICARE

## 2022-03-20 VITALS
RESPIRATION RATE: 19 BRPM | OXYGEN SATURATION: 97 % | SYSTOLIC BLOOD PRESSURE: 115 MMHG | TEMPERATURE: 98 F | HEART RATE: 78 BPM | DIASTOLIC BLOOD PRESSURE: 74 MMHG | HEIGHT: 68 IN | WEIGHT: 210.1 LBS

## 2022-03-20 DIAGNOSIS — I73.9 PERIPHERAL VASCULAR DISEASE, UNSPECIFIED: ICD-10-CM

## 2022-03-20 DIAGNOSIS — R29.898 OTHER SYMPTOMS AND SIGNS INVOLVING THE MUSCULOSKELETAL SYSTEM: ICD-10-CM

## 2022-03-20 DIAGNOSIS — N39.0 URINARY TRACT INFECTION, SITE NOT SPECIFIED: ICD-10-CM

## 2022-03-20 DIAGNOSIS — E78.5 HYPERLIPIDEMIA, UNSPECIFIED: ICD-10-CM

## 2022-03-20 DIAGNOSIS — R07.89 OTHER CHEST PAIN: ICD-10-CM

## 2022-03-20 DIAGNOSIS — I10 ESSENTIAL (PRIMARY) HYPERTENSION: ICD-10-CM

## 2022-03-20 DIAGNOSIS — E11.9 TYPE 2 DIABETES MELLITUS WITHOUT COMPLICATIONS: ICD-10-CM

## 2022-03-20 DIAGNOSIS — R07.9 CHEST PAIN, UNSPECIFIED: ICD-10-CM

## 2022-03-20 LAB
ALBUMIN SERPL ELPH-MCNC: 4 G/DL — SIGNIFICANT CHANGE UP (ref 3.3–5)
ALP SERPL-CCNC: 47 U/L — SIGNIFICANT CHANGE UP (ref 40–120)
ALT FLD-CCNC: 19 U/L — SIGNIFICANT CHANGE UP (ref 10–45)
ANION GAP SERPL CALC-SCNC: 12 MMOL/L — SIGNIFICANT CHANGE UP (ref 5–17)
APPEARANCE UR: ABNORMAL
APTT BLD: 33.2 SEC — SIGNIFICANT CHANGE UP (ref 27.5–35.5)
AST SERPL-CCNC: 22 U/L — SIGNIFICANT CHANGE UP (ref 10–40)
BACTERIA # UR AUTO: NEGATIVE — SIGNIFICANT CHANGE UP
BASE EXCESS BLDV CALC-SCNC: 2.4 MMOL/L — HIGH (ref -2–2)
BASOPHILS # BLD AUTO: 0.04 K/UL — SIGNIFICANT CHANGE UP (ref 0–0.2)
BASOPHILS NFR BLD AUTO: 0.3 % — SIGNIFICANT CHANGE UP (ref 0–2)
BILIRUB SERPL-MCNC: 1.2 MG/DL — SIGNIFICANT CHANGE UP (ref 0.2–1.2)
BILIRUB UR-MCNC: NEGATIVE — SIGNIFICANT CHANGE UP
BUN SERPL-MCNC: 22 MG/DL — SIGNIFICANT CHANGE UP (ref 7–23)
CA-I SERPL-SCNC: 1.2 MMOL/L — SIGNIFICANT CHANGE UP (ref 1.15–1.33)
CALCIUM SERPL-MCNC: 9.1 MG/DL — SIGNIFICANT CHANGE UP (ref 8.4–10.5)
CHLORIDE BLDV-SCNC: 102 MMOL/L — SIGNIFICANT CHANGE UP (ref 96–108)
CHLORIDE SERPL-SCNC: 103 MMOL/L — SIGNIFICANT CHANGE UP (ref 96–108)
CK MB BLD-MCNC: 4.1 % — HIGH (ref 0–3.5)
CK MB CFR SERPL CALC: 8.3 NG/ML — HIGH (ref 0–6.7)
CK MB CFR SERPL CALC: 8.4 NG/ML — HIGH (ref 0–6.7)
CK SERPL-CCNC: 207 U/L — HIGH (ref 30–200)
CO2 BLDV-SCNC: 29 MMOL/L — HIGH (ref 22–26)
CO2 SERPL-SCNC: 24 MMOL/L — SIGNIFICANT CHANGE UP (ref 22–31)
COLOR SPEC: ABNORMAL
CREAT SERPL-MCNC: 0.93 MG/DL — SIGNIFICANT CHANGE UP (ref 0.5–1.3)
DIFF PNL FLD: ABNORMAL
EGFR: 88 ML/MIN/1.73M2 — SIGNIFICANT CHANGE UP
EOSINOPHIL # BLD AUTO: 0.13 K/UL — SIGNIFICANT CHANGE UP (ref 0–0.5)
EOSINOPHIL NFR BLD AUTO: 1.1 % — SIGNIFICANT CHANGE UP (ref 0–6)
EPI CELLS # UR: 0 /HPF — SIGNIFICANT CHANGE UP
GAS PNL BLDV: 134 MMOL/L — LOW (ref 136–145)
GAS PNL BLDV: SIGNIFICANT CHANGE UP
GLUCOSE BLDC GLUCOMTR-MCNC: 106 MG/DL — HIGH (ref 70–99)
GLUCOSE BLDC GLUCOMTR-MCNC: 96 MG/DL — SIGNIFICANT CHANGE UP (ref 70–99)
GLUCOSE BLDV-MCNC: 121 MG/DL — HIGH (ref 70–99)
GLUCOSE SERPL-MCNC: 118 MG/DL — HIGH (ref 70–99)
GLUCOSE UR QL: NEGATIVE — SIGNIFICANT CHANGE UP
HCO3 BLDV-SCNC: 28 MMOL/L — SIGNIFICANT CHANGE UP (ref 22–29)
HCT VFR BLD CALC: 45.1 % — SIGNIFICANT CHANGE UP (ref 39–50)
HCT VFR BLDA CALC: 45 % — SIGNIFICANT CHANGE UP (ref 39–51)
HGB BLD CALC-MCNC: 15.1 G/DL — SIGNIFICANT CHANGE UP (ref 12.6–17.4)
HGB BLD-MCNC: 14.6 G/DL — SIGNIFICANT CHANGE UP (ref 13–17)
HYALINE CASTS # UR AUTO: 0 /LPF — SIGNIFICANT CHANGE UP (ref 0–2)
IMM GRANULOCYTES NFR BLD AUTO: 0.5 % — SIGNIFICANT CHANGE UP (ref 0–1.5)
INR BLD: 1.39 RATIO — HIGH (ref 0.88–1.16)
KETONES UR-MCNC: NEGATIVE — SIGNIFICANT CHANGE UP
LACTATE BLDV-MCNC: 1.6 MMOL/L — SIGNIFICANT CHANGE UP (ref 0.7–2)
LEUKOCYTE ESTERASE UR-ACNC: ABNORMAL
LYMPHOCYTES # BLD AUTO: 0.83 K/UL — LOW (ref 1–3.3)
LYMPHOCYTES # BLD AUTO: 6.8 % — LOW (ref 13–44)
MAGNESIUM SERPL-MCNC: 1.9 MG/DL — SIGNIFICANT CHANGE UP (ref 1.6–2.6)
MCHC RBC-ENTMCNC: 30.9 PG — SIGNIFICANT CHANGE UP (ref 27–34)
MCHC RBC-ENTMCNC: 32.4 GM/DL — SIGNIFICANT CHANGE UP (ref 32–36)
MCV RBC AUTO: 95.3 FL — SIGNIFICANT CHANGE UP (ref 80–100)
MONOCYTES # BLD AUTO: 1.05 K/UL — HIGH (ref 0–0.9)
MONOCYTES NFR BLD AUTO: 8.6 % — SIGNIFICANT CHANGE UP (ref 2–14)
NEUTROPHILS # BLD AUTO: 10.07 K/UL — HIGH (ref 1.8–7.4)
NEUTROPHILS NFR BLD AUTO: 82.7 % — HIGH (ref 43–77)
NITRITE UR-MCNC: POSITIVE
NRBC # BLD: 0 /100 WBCS — SIGNIFICANT CHANGE UP (ref 0–0)
PCO2 BLDV: 45 MMHG — SIGNIFICANT CHANGE UP (ref 42–55)
PH BLDV: 7.4 — SIGNIFICANT CHANGE UP (ref 7.32–7.43)
PH UR: 6.5 — SIGNIFICANT CHANGE UP (ref 5–8)
PLATELET # BLD AUTO: 118 K/UL — LOW (ref 150–400)
PO2 BLDV: 40 MMHG — SIGNIFICANT CHANGE UP (ref 25–45)
POTASSIUM BLDV-SCNC: 4.1 MMOL/L — SIGNIFICANT CHANGE UP (ref 3.5–5.1)
POTASSIUM SERPL-MCNC: 4.1 MMOL/L — SIGNIFICANT CHANGE UP (ref 3.5–5.3)
POTASSIUM SERPL-SCNC: 4.1 MMOL/L — SIGNIFICANT CHANGE UP (ref 3.5–5.3)
PROT SERPL-MCNC: 7.1 G/DL — SIGNIFICANT CHANGE UP (ref 6–8.3)
PROT UR-MCNC: ABNORMAL
PROTHROM AB SERPL-ACNC: 16 SEC — HIGH (ref 10.5–13.4)
RBC # BLD: 4.73 M/UL — SIGNIFICANT CHANGE UP (ref 4.2–5.8)
RBC # FLD: 14.6 % — HIGH (ref 10.3–14.5)
RBC CASTS # UR COMP ASSIST: 3 /HPF — SIGNIFICANT CHANGE UP (ref 0–4)
SAO2 % BLDV: 74.9 % — SIGNIFICANT CHANGE UP (ref 67–88)
SARS-COV-2 RNA SPEC QL NAA+PROBE: SIGNIFICANT CHANGE UP
SODIUM SERPL-SCNC: 139 MMOL/L — SIGNIFICANT CHANGE UP (ref 135–145)
SP GR SPEC: 1.01 — SIGNIFICANT CHANGE UP (ref 1.01–1.02)
TROPONIN T, HIGH SENSITIVITY RESULT: 30 NG/L — SIGNIFICANT CHANGE UP (ref 0–51)
TROPONIN T, HIGH SENSITIVITY RESULT: 34 NG/L — SIGNIFICANT CHANGE UP (ref 0–51)
UROBILINOGEN FLD QL: ABNORMAL
WBC # BLD: 12.18 K/UL — HIGH (ref 3.8–10.5)
WBC # FLD AUTO: 12.18 K/UL — HIGH (ref 3.8–10.5)
WBC UR QL: 349 /HPF — HIGH (ref 0–5)

## 2022-03-20 PROCEDURE — 99284 EMERGENCY DEPT VISIT MOD MDM: CPT | Mod: CS

## 2022-03-20 PROCEDURE — 70496 CT ANGIOGRAPHY HEAD: CPT | Mod: 26,MA

## 2022-03-20 PROCEDURE — 70498 CT ANGIOGRAPHY NECK: CPT | Mod: 26,MA

## 2022-03-20 PROCEDURE — 93010 ELECTROCARDIOGRAM REPORT: CPT

## 2022-03-20 PROCEDURE — 71045 X-RAY EXAM CHEST 1 VIEW: CPT | Mod: 26

## 2022-03-20 RX ORDER — CEFTRIAXONE 500 MG/1
1000 INJECTION, POWDER, FOR SOLUTION INTRAMUSCULAR; INTRAVENOUS EVERY 24 HOURS
Refills: 0 | Status: DISCONTINUED | OUTPATIENT
Start: 2022-03-21 | End: 2022-03-23

## 2022-03-20 RX ORDER — INFLUENZA VIRUS VACCINE 15; 15; 15; 15 UG/.5ML; UG/.5ML; UG/.5ML; UG/.5ML
0.7 SUSPENSION INTRAMUSCULAR ONCE
Refills: 0 | Status: DISCONTINUED | OUTPATIENT
Start: 2022-03-20 | End: 2022-03-23

## 2022-03-20 RX ORDER — ASPIRIN/CALCIUM CARB/MAGNESIUM 324 MG
81 TABLET ORAL DAILY
Refills: 0 | Status: DISCONTINUED | OUTPATIENT
Start: 2022-03-20 | End: 2022-03-23

## 2022-03-20 RX ORDER — SODIUM CHLORIDE 9 MG/ML
1000 INJECTION, SOLUTION INTRAVENOUS
Refills: 0 | Status: DISCONTINUED | OUTPATIENT
Start: 2022-03-20 | End: 2022-03-23

## 2022-03-20 RX ORDER — LISINOPRIL 2.5 MG/1
10 TABLET ORAL DAILY
Refills: 0 | Status: DISCONTINUED | OUTPATIENT
Start: 2022-03-20 | End: 2022-03-23

## 2022-03-20 RX ORDER — ATORVASTATIN CALCIUM 80 MG/1
40 TABLET, FILM COATED ORAL AT BEDTIME
Refills: 0 | Status: DISCONTINUED | OUTPATIENT
Start: 2022-03-20 | End: 2022-03-23

## 2022-03-20 RX ORDER — PHENAZOPYRIDINE HCL 100 MG
200 TABLET ORAL ONCE
Refills: 0 | Status: COMPLETED | OUTPATIENT
Start: 2022-03-20 | End: 2022-03-21

## 2022-03-20 RX ORDER — METOPROLOL TARTRATE 50 MG
50 TABLET ORAL DAILY
Refills: 0 | Status: DISCONTINUED | OUTPATIENT
Start: 2022-03-20 | End: 2022-03-23

## 2022-03-20 RX ORDER — FUROSEMIDE 40 MG
1 TABLET ORAL
Qty: 0 | Refills: 0 | DISCHARGE

## 2022-03-20 RX ORDER — CEFTRIAXONE 500 MG/1
1000 INJECTION, POWDER, FOR SOLUTION INTRAMUSCULAR; INTRAVENOUS ONCE
Refills: 0 | Status: COMPLETED | OUTPATIENT
Start: 2022-03-20 | End: 2022-03-20

## 2022-03-20 RX ORDER — ASPIRIN/CALCIUM CARB/MAGNESIUM 324 MG
324 TABLET ORAL ONCE
Refills: 0 | Status: COMPLETED | OUTPATIENT
Start: 2022-03-20 | End: 2022-03-20

## 2022-03-20 RX ORDER — HEPARIN SODIUM 5000 [USP'U]/ML
5000 INJECTION INTRAVENOUS; SUBCUTANEOUS EVERY 8 HOURS
Refills: 0 | Status: DISCONTINUED | OUTPATIENT
Start: 2022-03-20 | End: 2022-03-21

## 2022-03-20 RX ORDER — GLUCAGON INJECTION, SOLUTION 0.5 MG/.1ML
1 INJECTION, SOLUTION SUBCUTANEOUS ONCE
Refills: 0 | Status: DISCONTINUED | OUTPATIENT
Start: 2022-03-20 | End: 2022-03-23

## 2022-03-20 RX ORDER — DEXTROSE 50 % IN WATER 50 %
25 SYRINGE (ML) INTRAVENOUS ONCE
Refills: 0 | Status: DISCONTINUED | OUTPATIENT
Start: 2022-03-20 | End: 2022-03-23

## 2022-03-20 RX ORDER — DEXTROSE 50 % IN WATER 50 %
15 SYRINGE (ML) INTRAVENOUS ONCE
Refills: 0 | Status: DISCONTINUED | OUTPATIENT
Start: 2022-03-20 | End: 2022-03-23

## 2022-03-20 RX ORDER — LOSARTAN POTASSIUM 100 MG/1
1 TABLET, FILM COATED ORAL
Qty: 0 | Refills: 0 | DISCHARGE

## 2022-03-20 RX ORDER — DEXTROSE 50 % IN WATER 50 %
12.5 SYRINGE (ML) INTRAVENOUS ONCE
Refills: 0 | Status: DISCONTINUED | OUTPATIENT
Start: 2022-03-20 | End: 2022-03-23

## 2022-03-20 RX ORDER — INSULIN LISPRO 100/ML
VIAL (ML) SUBCUTANEOUS
Refills: 0 | Status: DISCONTINUED | OUTPATIENT
Start: 2022-03-20 | End: 2022-03-23

## 2022-03-20 RX ORDER — ROSUVASTATIN CALCIUM 5 MG/1
1 TABLET ORAL
Qty: 0 | Refills: 0 | DISCHARGE

## 2022-03-20 RX ORDER — INSULIN DETEMIR 100/ML (3)
20 INSULIN PEN (ML) SUBCUTANEOUS
Qty: 0 | Refills: 0 | DISCHARGE

## 2022-03-20 RX ORDER — INSULIN LISPRO 100/ML
VIAL (ML) SUBCUTANEOUS AT BEDTIME
Refills: 0 | Status: DISCONTINUED | OUTPATIENT
Start: 2022-03-20 | End: 2022-03-23

## 2022-03-20 RX ADMIN — HEPARIN SODIUM 5000 UNIT(S): 5000 INJECTION INTRAVENOUS; SUBCUTANEOUS at 21:53

## 2022-03-20 RX ADMIN — CEFTRIAXONE 100 MILLIGRAM(S): 500 INJECTION, POWDER, FOR SOLUTION INTRAMUSCULAR; INTRAVENOUS at 11:54

## 2022-03-20 RX ADMIN — Medication 324 MILLIGRAM(S): at 14:59

## 2022-03-20 RX ADMIN — ATORVASTATIN CALCIUM 40 MILLIGRAM(S): 80 TABLET, FILM COATED ORAL at 21:53

## 2022-03-20 NOTE — ED ADULT NURSE REASSESSMENT NOTE - NS ED NURSE REASSESS COMMENT FT1
pt voided dark orange urine with sterile specimen urine culture sent to lab pt to ct scan via stretcher

## 2022-03-20 NOTE — H&P ADULT - NSHPREVIEWOFSYSTEMS_GEN_ALL_CORE
Gen: no loss of wt no loss of appetite + lack of energy   ENT: no dizziness no hearing loss  Ophth: no blurring of vision no loss of vision  Resp: No cough no sputum production  CVS: see above HPI no palpitations no orthopnea  GI: no nausea, vomiting or diarrhea   : no dysuria, hematuria  Endo: no polyuria no excessive sweating  Neuro: no weakness no paresthesias  Heme: No petechiae no easy bruising  Msk: No joint pain no swelling see above HPI   Skin: No rash no itching

## 2022-03-20 NOTE — ED PROVIDER NOTE - OBJECTIVE STATEMENT
71 year old male PMH HTN, HLD, a-fib (no AC), CAD on ASA daily, prostatitis, presents to ED for RLE "heaviness". Pt states around 8am today while sitting he developed right leg "heaviness". States he could still move his leg and ambulate, but that he felt heavier to him. No hx of stroke, DVT, or PE. Has not taken ASA for 2 days. Has not taken Plavix or AC in 2 months. Pt also notes that he had substernal chest pain last night after getting "emotional", which resolved last night. Pain did not radiate, was not exertional or pleuritic, and had no modifying factors. He states he has felt "unwell" for the past few days with intense dysuria. He had prostatitis in the past so he started taking Cipro and Levaquin 2 days ago. He endorses chills. Denies fever, cough, sob, abd pain, n/v/d/c, or weakness.

## 2022-03-20 NOTE — H&P ADULT - HISTORY OF PRESENT ILLNESS
71 year old male PMH HTN, HLD, A fib (no AC), CAD on ASA daily, prostatitis, presents to ED for RLE "heaviness". Pt states around 8am today while sitting he developed right leg "heaviness". States he could still move his leg and ambulate, but that he felt heavier to him. No hx of stroke, DVT, or PE. Has not taken ASA for 2 days. Has not taken Plavix or AC in 2 months. Pt also notes that he had substernal chest pain last night after getting "emotional", which resolved last night. Pain did not radiate, was not exertional or pleuritic, and had no modifying factors. He states he has felt "unwell" for the past few days with intense dysuria. He had prostatitis in the past so he started taking Cipro and Levaquin 2 days ago. He states that a few days ago when he started with dysuria he also had shaking chills which resolved since

## 2022-03-20 NOTE — ED ADULT TRIAGE NOTE - ESI TRIAGE ACUITY LEVEL, MLM
HISTORY OF PRESENT ILLNESS  Jarrod Ceja is a 62 y.o. female. HPI Comments: Patient returns, memory has not changed. 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 She does state now that her boss at work is documenting everything that does or does not go wrong right. She continues to drive however she has gotten lost at least once. She is tolerating the Aricept 10 mg a day. She denies any other problems and her  denies any other problems. She has no bowel or bladder complaints. Review of Systems   Constitutional:        Review of systems is positive for a recent fall in the yard with no significant injury, complete review of systems done all others negative. Current Outpatient Prescriptions on File Prior to Visit   Medication Sig Dispense Refill    donepezil (ARICEPT) 5 mg tablet Take 1 tablet a day for 30 days then take 2 tablets a day. Indications: MILD TO MODERATE ALZHEIMER'S TYPE DEMENTIA 60 Tab 5    aspirin delayed-release 81 mg tablet Take 1 Tab by mouth daily. 30 Tab 0    pravastatin (PRAVACHOL) 10 mg tablet Take 1 Tab by mouth nightly. 30 Tab 6     No current facility-administered medications on file prior to visit.       Past Medical History:   Diagnosis Date    Breast cancer ([facility] [address] 2008    Left    Cancer [facility])     Hyperlipidemia 6/30/2017    Radiation therapy complication     Lt breast 2008--no chemo     Family History   Problem Relation Age of Onset    Lung Disease Brother     Breast Cancer Sister      43     Social History   Substance Use Topics    Smoking status: Never Smoker    Smokeless tobacco: Never Used    Alcohol use No     Patient Active Problem List   Diagnosis Code    History of breast cancer Z85.3    Hyperlipidemia E78.5    Early onset Alzheimer's dementia without behavioral disturbance G30.0, F02.80    Altered mental status, unspecified R41.82     /80  Pulse 78  Ht 5' 6\" (1.676 m)  Wt 133 lb (60.3 kg)  SpO2 99%  BMI 21.47 kg/m2    MRI Results (most recent):    Results from Hospital Encounter encounter on 11/08/17   MRI BRAIN W WO CONT   Narrative EXAM:  MRI BRAIN W WO CONT    INDICATION:   Rapid progression of dementia. Left breast carcinoma in 2008. COMPARISON: CT head on 8/7/2017. TECHNIQUE: Sagittal T1; coronal post contrast T1; axial T1, T2, FLAIR,  gradient-echo, diffusion weighted MRI of the brain before and following  uneventful intravenous administration of gadolinium 13 mL ProHance    FINDINGS: No hydrocephalus. No mass effect or midline shift. No extra-axial  fluid collection. Minimal chronic microvascular ischemic disease for age. No  restricted diffusion to indicate acute infarct. Enhancing mass of the left cerebellopontine angle measures 1.8 x 1.2 x 1.5 cm  and extends into the left internal auditory canal. Mild mass effect on the left  cerebellar peduncle. No displacement of the carrie or medulla. The major intracranial vascular flow-voids are patent. The midline structures,  including the cervicomedullary junction, are within normal limits. Impression IMPRESSION:    1. Minimal chronic microvascular ischemic disease. No acute infarct. 2. 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 Mild mass effect on the left middle cerebellar peduncle. Physical Exam   Constitutional: She appears well-developed and well-nourished. No distress. HENT:   Head: Normocephalic and atraumatic. Mouth/Throat: Oropharynx is clear and moist. No oropharyngeal exudate. Eyes: Conjunctivae and EOM are normal. Pupils are equal, round, and reactive to light. No scleral icterus. Saccades are a little jerky and not smooth. Neck: Neck supple. No thyromegaly present. Cardiovascular: Normal rate, regular rhythm and normal heart sounds. No murmur heard. Musculoskeletal: Normal range of motion.  She exhibits no edema, tenderness or deformity. Lymphadenopathy:     She has no cervical adenopathy. Skin: Skin is warm and dry. No rash noted. She is not diaphoretic. No erythema. Psychiatric: She has a normal mood and affect. Her behavior is normal.  She is pleasantly mildly confused but understands that there are problems at work        ASSESSMENT and [address] [place] [address]  Patient has an early fairly rapidly progressive dementia. She is tolerating the Aricept 10 mg a day, I am going to put her on a Namenda starter pack and then on Namenda 28 mg a day. I recommended to the  that he go with her to work to talk with the supervisor so that they can arrange an orderly non-adversarial shelter for this woman. I will be happy to fill out her disability papers when she is out of work, I did explain to him that there is usually a waiting period for disability. I will follow the CP angle tumor with a repeat MRI scan in 6 months. Given the size of the tumor and the limited mass-effect I think this is a very slow-growing tumor. This note will not be viewable in [address]

## 2022-03-20 NOTE — ED PROVIDER NOTE - PROGRESS NOTE DETAILS
Dwaine Babb, PGY3: WBC 12 w/ left shift, still evaluating for potential etiologies. Plt slightly low, no active bleeding noted, will require trending inpatient. Dwaine Babb, PGY3: UA positive, abx ordered. Patient currently in CT. Dwaine Babb, PGY3: CT with some vertebral artery narrowing, likely not contributing to patient's current symptoms. Patient aware of CT findings. Patient endorsed to Dr. Linden Garcia as unattached admission.

## 2022-03-20 NOTE — H&P ADULT - ASSESSMENT
71 year old male PMH HTN, HLD, A fib (no AC), CAD on ASA daily, prostatitis, presents to Emergency Department with right leg heaviness and vague discomfort in his chest admitted for further work up

## 2022-03-20 NOTE — ED PROVIDER NOTE - PHYSICAL EXAMINATION
GENERAL: A&Ox3, non-toxic appearing, no acute distress  HEENT: NCAT, EOMI, oral mucosa moist, normal conjunctiva  RESP: CTAB, no respiratory distress, no wheezes/rhonchi/rales, speaking in full sentences  CV: irregularly irregular, no murmurs/rubs/gallops  ABDOMEN: soft, non-tender, non-distended, no guarding  MSK: no visible deformities  NEURO: no focal sensory or motor deficits, CN 2-12 grossly intact, no pronator drift, normal finger to nose, 5/5 strength in all extremities, SILT  SKIN: warm, normal color, well perfused, no rash  PSYCH: normal affect

## 2022-03-20 NOTE — PATIENT PROFILE ADULT - FALL HARM RISK - HARM RISK INTERVENTIONS

## 2022-03-20 NOTE — H&P ADULT - PROBLEM SELECTOR PLAN 7
HbA1C in AM  continue finger sticks with short acting insulin sliding scale  patient recent HbA1C was 6.1

## 2022-03-20 NOTE — H&P ADULT - PROBLEM SELECTOR PLAN 4
continue statin  fasting lipid panel in AM continue home meds with hold parameters  acceptable for now

## 2022-03-20 NOTE — ED PROVIDER NOTE - CLINICAL SUMMARY MEDICAL DECISION MAKING FREE TEXT BOX
Dwaine Babb, PGY3: 71 year old male w/ multiple medical complaints. Called as code stroke on arrival which was cancelled by ED w/ neurology at bedside. Pt c/o brief chest pain last night, dysuria x2 days, and RLE "heaviness" x2 hours. Initial EKG without acute ST changes. Plan for neuro eval w/ CT/CTA and neuro consult, cardiac eval w/ ekg, labs, and cxr, and infectious workup with rectal temp, labs, ua/ucx. Will require admission for further management.

## 2022-03-20 NOTE — H&P ADULT - NSHPADDITIONALINFOADULT_GEN_ALL_CORE
discussed with patient in detail, expresses understanding of treatment plans.  discussed with patient's wife at bedside in detail

## 2022-03-20 NOTE — H&P ADULT - PROBLEM SELECTOR PLAN 6
HbA1C in AM  continue finger sticks with short acting insulin sliding scale  patient recent HbA1C was 6.1 supportive vare  left Charcot's foot  no active ulcers  continue ASA

## 2022-03-20 NOTE — ED ADULT NURSE REASSESSMENT NOTE - NS ED NURSE REASSESS COMMENT FT1
pt assisted to bathroom ambulatory no chest pain no sob wife at bedside pt pending disposition remains with cardiac monitoring normal sinus rythem

## 2022-03-20 NOTE — ED ADULT NURSE NOTE - OBJECTIVE STATEMENT
72 yo male with a PMH of a.fib (no longer on ACs), HTN, HLD presents to the ED complaining of RLE heaviness. Patient states that he has been awake since 6a today, around 8a was trying to get into his chair when he felt his RLE feel heavy. Code stroke initiated in triage @ 0922, cancelled by Stroke team @ 0927 upon evaluation. Patient has no focal deficits, A&Ox3 and speaking in full coherent sentences. Patient is also complaining of chest pressure "for months" and states "I just have not felt right for about year." Patient also complaining of 2 weeks of dysuria, has been taking abx. Denies headache, dizziness, vision changes, shortness of breath, abdominal pain, nausea, vomiting, diarrhea, fevers, hematuria, recent illness travel or fall. 72 yo male with a PMH of a.fib (no longer on ACs), HTN, HLD presents to the ED complaining of RLE heaviness. Patient states that he has been awake since 6a today, around 8a was trying to get into his chair when he felt his RLE feel heavy. Code stroke initiated in triage @ 0922, cancelled by Stroke team @ 0927 upon evaluation. Patient has no focal deficits, A&Ox3 and speaking in full coherent sentences. Patient is also complaining of chest pressure "for months" and states "I just have not felt right for about year." Patient also complaining of 2 weeks of dysuria, has been taking abx. Neuro intact, ACOSTA, +2 strength in all 4 extremties. PERRL. Denies headache, dizziness, vision changes, shortness of breath, abdominal pain, nausea, vomiting, diarrhea, fevers, hematuria, recent illness travel or fall.

## 2022-03-20 NOTE — H&P ADULT - NSHPPHYSICALEXAM_GEN_ALL_CORE
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

## 2022-03-20 NOTE — H&P ADULT - PROBLEM SELECTOR PLAN 3
continue home meds with hold parameters  acceptable for now recently self treated himself with Cipro and Levoquin   will discontinue  urine culture sent  blood cultures ordered secondary to h/o shaking chills at home  continue ceftriaxone IV

## 2022-03-20 NOTE — ED PROVIDER NOTE - ATTENDING CONTRIBUTION TO CARE
Reggie Shepherd MD:   I personally saw the patient and performed a substantive portion of the visit including all aspects of the medical decision making.    MDM: 71 M w/ Hx of AFib, HTN, DM, HLD, who p/w chest pain. Also reports that this morning he felt R leg .   Stroke code called, patient seen immediately by Neurology and ED team. No focal neuro symptoms or deficits, stroke code cancelled.   2 weeks of dysuria, dx w/ UTI on Levaquin.     Patient no longer on Eliquis or Xarelto, taking Aspirin, last dose several days ago. Reggie Shepherd MD:   I personally saw the patient and performed a substantive portion of the visit including all aspects of the medical decision making.    MDM: 71 M w/ Hx of AFib, HTN, DM, HLD, who p/w chest pain. Also reports that this morning he felt R leg heaviness. But denies any facial droop, arm weakness/numbness or other neuro symptoms.    Stroke code called, patient seen immediately by Neurology and ED team. No focal neuro deficits, and concern for another emergent process including ACS, stroke code cancelled. However, patient still is at risk for CVA given he has AFib and not on A/C, and has carotid stenosis. Will get CT and CTA, but patient not a candidate for TPA given NIHSS 0.   Pt with dysuria, dx w/ UTI on Levaquin for last 2 days. Will evaluate for UTI with UA and UCx.   Patient no longer on Eliquis or Xarelto, taking Aspirin, last dose several days ago. Awaiting CTH prior to giving ASA in the ED. Reggie Shepherd MD:   I personally saw the patient and performed a substantive portion of the visit including all aspects of the medical decision making.    MDM: 71 M w/ Hx of AFib, HTN, DM, HLD, who p/w chest pain. Also reports that this morning he felt R leg heaviness. But denies any facial droop, arm weakness/numbness or other neuro symptoms.    Stroke code called, patient seen immediately by Neurology and ED team. No focal neuro deficits, and concern for another emergent process including ACS, stroke code cancelled. However, patient still is at risk for CVA given he has AFib and not on A/C, and has carotid stenosis. Will get CT and CTA, but patient not a candidate for TPA given NIHSS 0.   Pt with dysuria, dx w/ UTI on Levaquin for last 2 days. Will evaluate for UTI with UA and UCx.   Patient no longer on Eliquis or Xarelto, taking Aspirin, last dose several days ago.  Patient admitted for ischemic chest pain, UTI, and for further neurologic work-up for RLE heaviness with possible MRI.

## 2022-03-20 NOTE — H&P ADULT - PROBLEM SELECTOR PLAN 5
supportive vare  left Charcot's foot  no active ulcers  continue ASA continue statin  fasting lipid panel in AM

## 2022-03-20 NOTE — H&P ADULT - PROBLEM SELECTOR PLAN 2
CTA negative   will have formal neuro evaluation tomorrow am  currently able to walk   will order brain MR and MRA brain

## 2022-03-20 NOTE — H&P ADULT - PROBLEM SELECTOR PLAN 1
unclear etiology  troponins are flat  will hold off on IV heparin for now unless recurs  continue ASA  cardiology evaluation in AM

## 2022-03-21 LAB
A1C WITH ESTIMATED AVERAGE GLUCOSE RESULT: 6 % — HIGH (ref 4–5.6)
ANION GAP SERPL CALC-SCNC: 11 MMOL/L — SIGNIFICANT CHANGE UP (ref 5–17)
APTT BLD: 30.3 SEC — SIGNIFICANT CHANGE UP (ref 27.5–35.5)
BUN SERPL-MCNC: 23 MG/DL — SIGNIFICANT CHANGE UP (ref 7–23)
CALCIUM SERPL-MCNC: 8.8 MG/DL — SIGNIFICANT CHANGE UP (ref 8.4–10.5)
CHLORIDE SERPL-SCNC: 106 MMOL/L — SIGNIFICANT CHANGE UP (ref 96–108)
CHOLEST SERPL-MCNC: 103 MG/DL — SIGNIFICANT CHANGE UP
CO2 SERPL-SCNC: 24 MMOL/L — SIGNIFICANT CHANGE UP (ref 22–31)
CREAT SERPL-MCNC: 0.88 MG/DL — SIGNIFICANT CHANGE UP (ref 0.5–1.3)
EGFR: 93 ML/MIN/1.73M2 — SIGNIFICANT CHANGE UP
ESTIMATED AVERAGE GLUCOSE: 126 MG/DL — HIGH (ref 68–114)
GLUCOSE BLDC GLUCOMTR-MCNC: 116 MG/DL — HIGH (ref 70–99)
GLUCOSE BLDC GLUCOMTR-MCNC: 117 MG/DL — HIGH (ref 70–99)
GLUCOSE BLDC GLUCOMTR-MCNC: 119 MG/DL — HIGH (ref 70–99)
GLUCOSE BLDC GLUCOMTR-MCNC: 123 MG/DL — HIGH (ref 70–99)
GLUCOSE SERPL-MCNC: 139 MG/DL — HIGH (ref 70–99)
HCT VFR BLD CALC: 43.8 % — SIGNIFICANT CHANGE UP (ref 39–50)
HCV AB S/CO SERPL IA: 0.16 S/CO — SIGNIFICANT CHANGE UP (ref 0–0.99)
HCV AB SERPL-IMP: SIGNIFICANT CHANGE UP
HDLC SERPL-MCNC: 26 MG/DL — LOW
HGB BLD-MCNC: 14.3 G/DL — SIGNIFICANT CHANGE UP (ref 13–17)
LIPID PNL WITH DIRECT LDL SERPL: 56 MG/DL — SIGNIFICANT CHANGE UP
MCHC RBC-ENTMCNC: 31 PG — SIGNIFICANT CHANGE UP (ref 27–34)
MCHC RBC-ENTMCNC: 32.6 GM/DL — SIGNIFICANT CHANGE UP (ref 32–36)
MCV RBC AUTO: 94.8 FL — SIGNIFICANT CHANGE UP (ref 80–100)
NON HDL CHOLESTEROL: 77 MG/DL — SIGNIFICANT CHANGE UP
NRBC # BLD: 0 /100 WBCS — SIGNIFICANT CHANGE UP (ref 0–0)
PLATELET # BLD AUTO: 108 K/UL — LOW (ref 150–400)
POTASSIUM SERPL-MCNC: 4 MMOL/L — SIGNIFICANT CHANGE UP (ref 3.5–5.3)
POTASSIUM SERPL-SCNC: 4 MMOL/L — SIGNIFICANT CHANGE UP (ref 3.5–5.3)
RBC # BLD: 4.62 M/UL — SIGNIFICANT CHANGE UP (ref 4.2–5.8)
RBC # FLD: 14.8 % — HIGH (ref 10.3–14.5)
SODIUM SERPL-SCNC: 141 MMOL/L — SIGNIFICANT CHANGE UP (ref 135–145)
TRIGL SERPL-MCNC: 104 MG/DL — SIGNIFICANT CHANGE UP
TSH SERPL-MCNC: 5.41 UIU/ML — HIGH (ref 0.27–4.2)
WBC # BLD: 5.98 K/UL — SIGNIFICANT CHANGE UP (ref 3.8–10.5)
WBC # FLD AUTO: 5.98 K/UL — SIGNIFICANT CHANGE UP (ref 3.8–10.5)

## 2022-03-21 PROCEDURE — 71260 CT THORAX DX C+: CPT | Mod: 26

## 2022-03-21 PROCEDURE — 99222 1ST HOSP IP/OBS MODERATE 55: CPT

## 2022-03-21 PROCEDURE — 70551 MRI BRAIN STEM W/O DYE: CPT | Mod: 26

## 2022-03-21 PROCEDURE — 93880 EXTRACRANIAL BILAT STUDY: CPT | Mod: 26

## 2022-03-21 PROCEDURE — 74177 CT ABD & PELVIS W/CONTRAST: CPT | Mod: 26

## 2022-03-21 PROCEDURE — 70544 MR ANGIOGRAPHY HEAD W/O DYE: CPT | Mod: 26,59

## 2022-03-21 RX ORDER — HEPARIN SODIUM 5000 [USP'U]/ML
INJECTION INTRAVENOUS; SUBCUTANEOUS
Qty: 25000 | Refills: 0 | Status: DISCONTINUED | OUTPATIENT
Start: 2022-03-21 | End: 2022-03-23

## 2022-03-21 RX ORDER — TAMSULOSIN HYDROCHLORIDE 0.4 MG/1
0.8 CAPSULE ORAL ONCE
Refills: 0 | Status: COMPLETED | OUTPATIENT
Start: 2022-03-21 | End: 2022-03-21

## 2022-03-21 RX ORDER — TAMSULOSIN HYDROCHLORIDE 0.4 MG/1
0.8 CAPSULE ORAL AT BEDTIME
Refills: 0 | Status: DISCONTINUED | OUTPATIENT
Start: 2022-03-22 | End: 2022-03-23

## 2022-03-21 RX ADMIN — HEPARIN SODIUM 1700 UNIT(S)/HR: 5000 INJECTION INTRAVENOUS; SUBCUTANEOUS at 20:07

## 2022-03-21 RX ADMIN — Medication 200 MILLIGRAM(S): at 06:31

## 2022-03-21 RX ADMIN — CEFTRIAXONE 100 MILLIGRAM(S): 500 INJECTION, POWDER, FOR SOLUTION INTRAMUSCULAR; INTRAVENOUS at 06:32

## 2022-03-21 RX ADMIN — Medication 81 MILLIGRAM(S): at 12:36

## 2022-03-21 RX ADMIN — Medication 50 MILLIGRAM(S): at 06:32

## 2022-03-21 RX ADMIN — HEPARIN SODIUM 5000 UNIT(S): 5000 INJECTION INTRAVENOUS; SUBCUTANEOUS at 14:59

## 2022-03-21 RX ADMIN — LISINOPRIL 10 MILLIGRAM(S): 2.5 TABLET ORAL at 06:32

## 2022-03-21 RX ADMIN — HEPARIN SODIUM 5000 UNIT(S): 5000 INJECTION INTRAVENOUS; SUBCUTANEOUS at 06:31

## 2022-03-21 RX ADMIN — ATORVASTATIN CALCIUM 40 MILLIGRAM(S): 80 TABLET, FILM COATED ORAL at 21:35

## 2022-03-21 RX ADMIN — TAMSULOSIN HYDROCHLORIDE 0.8 MILLIGRAM(S): 0.4 CAPSULE ORAL at 12:35

## 2022-03-21 NOTE — CONSULT NOTE ADULT - SUBJECTIVE AND OBJECTIVE BOX
Patient is a 71y old  Male who presents with a chief complaint of chest and right leg heaviness (21 Mar 2022 12:02)    HPI:  71 year old male PMH HTN, HLD, A fib (no AC), CAD on ASA daily, prostatitis, presents to ED for RLE "heaviness". Pt states around 8am today while sitting he developed right leg "heaviness". States he could still move his leg and ambulate, but that he felt heavier to him. No hx of stroke, DVT, or PE. Has not taken ASA for 2 days. Has not taken Plavix or AC in 2 months. Pt also notes that he had substernal chest pain last night after getting "emotional", which resolved last night. Pain did not radiate, was not exertional or pleuritic, and had no modifying factors. He states he has felt "unwell" for the past few days with intense dysuria. He had prostatitis in the past so he started taking Cipro and Levaquin 2 days ago. He states that a few days ago when he started with dysuria he also had shaking chills which resolved since (20 Mar 2022 19:07)      PAST MEDICAL & SURGICAL HISTORY:  HTN (hypertension)    HLD (hyperlipidemia)    Diabetes 1.5, managed as type 2    PVD (peripheral vascular disease)    Atrial fibrillation    CAD (coronary artery disease)    No significant past surgical history        Social history:    FAMILY HISTORY:  FH: congestive heart failure  In Mother      REVIEW OF SYSTEMS  General:	Denies any malaise fatigue or chills. Fevers absent    Skin:No rash  	  Ophthalmologic:Denies any visual complaints,discharge redness or photophobia  	            Endocrine:	No recent weight gain or loss.No abnormal heat/cold intolerance    Allergic/Immunologic:	No hives or rash   Allergies    No Known Allergies    Intolerances        Antimicrobials:    cefTRIAXone   IVPB 1000 milliGRAM(s) IV Intermittent every 24 hours        Vital Signs Last 24 Hrs  T(C): 36.4 (21 Mar 2022 11:28), Max: 36.9 (20 Mar 2022 20:51)  T(F): 97.5 (21 Mar 2022 11:28), Max: 98.4 (20 Mar 2022 20:51)  HR: 68 (21 Mar 2022 11:28) (68 - 84)  BP: 105/67 (21 Mar 2022 11:28) (105/67 - 129/72)  BP(mean): --  RR: 18 (21 Mar 2022 11:28) (16 - 18)  SpO2: 96% (21 Mar 2022 11:28) (94% - 99%)     ss.      Constitutional:Comfortable.Awake and alert  No cachexia     Eyes:PERRL EOMI.NO discharge or conjunctival injection    ENMT:No sinus tenderness.No thrush.No pharyngeal exudate or erythema.Fair dental hygiene    Neck:Supple,No LN,no JVD      Respiratory:Good air entry bilaterally,CTA    Cardiovascular:S1 S2 wnl, No murmurs,rub or gallops    Gastrointestinal:Soft BS(+) no tenderness no masses ,No rebound or guarding    Genitourinary:No CVA tendereness     Rectal:    Extremities:No cyanosis,clubbing or edema.    Vascular:peripheral pulses felt    Neurological:AAO X 3,No grossly focal deficits    Skin:No rash     Lymph Nodes:No palpable LNs    Musculoskeletal:No joint swelling or LOM    Psychiatric:Affect normal.                                14.3   5.98  )-----------( 108      ( 21 Mar 2022 07:11 )             43.8         03-21    141  |  106  |  23  ----------------------------<  139<H>  4.0   |  24  |  0.88    Ca    8.8      21 Mar 2022 07:14  Mg     1.9     03-20    TPro  7.1  /  Alb  4.0  /  TBili  1.2  /  DBili  x   /  AST  22  /  ALT  19  /  AlkPhos  47  03-20      RECENT CULTURES:      MICROBIOLOGY:          Radiology:      Assessment:        Recommendations and Plan:    Pager [phone]  After 5 pm/weekends or if no response :[phone]

## 2022-03-21 NOTE — CONSULT NOTE ADULT - SUBJECTIVE AND OBJECTIVE BOX
C A R D I O L O G Y  *********************    DATE OF SERVICE: 03-21-22    HISTORY OF PRESENT ILLNESS: HPI:  Patient is a 70 y/o male with PMH HTN, HLD, chronic afib (not on AC?), CAD s/p FELA to mid LAD and OM1 (1/17/20), and prostatitis who presented with RLE heaviness and chest pressure. Cardiology consulted for further evaluation. Pt states around 8am yesterday while sitting he developed right leg "heaviness". States he could still move his leg and ambulate, but that he felt heavier to him. No hx of stroke, DVT, or PE. Has not taken ASA for 2 days. Has not taken Plavix or AC in 2 months. Pt also notes that he had substernal chest pain last night after getting "emotional", which resolved last night. Pain did not radiate, was not exertional or pleuritic, and had no modifying factors. He states he has felt "unwell" for the past few days with intense dysuria. He had prostatitis in the past so he started taking Cipro and Levaquin 2 days ago. He states that a few days ago when he started with dysuria he also had shaking chills which resolved since. Currently denies active chest pain. No recent stress test. Denies palpitations, SOB, dizziness, or syncope.      PAST MEDICAL & SURGICAL HISTORY:  HTN (hypertension)    HLD (hyperlipidemia)    Diabetes 1.5, managed as type 2    PVD (peripheral vascular disease)    Atrial fibrillation    CAD (coronary artery disease)    No significant past surgical history      MEDICATIONS:  MEDICATIONS  (STANDING):  aspirin enteric coated 81 milliGRAM(s) Oral daily  atorvastatin 40 milliGRAM(s) Oral at bedtime  cefTRIAXone   IVPB 1000 milliGRAM(s) IV Intermittent every 24 hours  dextrose 40% Gel 15 Gram(s) Oral once  dextrose 5%. 1000 milliLiter(s) (50 mL/Hr) IV Continuous <Continuous>  dextrose 5%. 1000 milliLiter(s) (100 mL/Hr) IV Continuous <Continuous>  dextrose 50% Injectable 25 Gram(s) IV Push once  dextrose 50% Injectable 12.5 Gram(s) IV Push once  dextrose 50% Injectable 25 Gram(s) IV Push once  glucagon  Injectable 1 milliGRAM(s) IntraMuscular once  heparin   Injectable 5000 Unit(s) SubCutaneous every 8 hours  influenza  Vaccine (HIGH DOSE) 0.7 milliLiter(s) IntraMuscular once  insulin lispro (ADMELOG) corrective regimen sliding scale   SubCutaneous three times a day before meals  insulin lispro (ADMELOG) corrective regimen sliding scale   SubCutaneous at bedtime  lisinopril 10 milliGRAM(s) Oral daily  metoprolol succinate ER 50 milliGRAM(s) Oral daily      Allergies    No Known Allergies    Intolerances        FAMILY HISTORY:  FH: congestive heart failure  In Mother      Non-contributary for premature coronary disease or sudden cardiac death    SOCIAL HISTORY:    [x ] Non-smoker  [ ] Smoker  [ ] Alcohol    FLU VACCINE THIS YEAR STARTS IN AUGUST:  [ ] Yes    [ ] No    IF OVER 65 HAVE YOU EVER HAD A PNA VACCINE:  [ ] Yes    [ ] No       [ ] N/A      REVIEW OF SYSTEMS:  [ x]chest pain  [  ]shortness of breath  [  ]palpitations  [  ]syncope  [ ]near syncope [ ]upper extremity weakness   [x ] lower extremity weakness  [  ]diplopia  [  ]altered mental status   [  ]fevers  [ ]chills [ ]nausea  [ ]vomiting  [  ]dysphagia    [ ]abdominal pain  [ ]melena  [ ]BRBPR    [  ]epistaxis  [  ]rash    [ ]lower extremity edema        [X] All others negative	  [ ] Unable to obtain      LABS:	 	    CARDIAC MARKERS:  CARDIAC MARKERS ( 20 Mar 2022 11:35 )  x     / x     / x     / x     / 8.3 ng/mL  CARDIAC MARKERS ( 20 Mar 2022 09:54 )  x     / x     / 207 U/L / x     / 8.4 ng/mL                              14.3   5.98  )-----------( 108      ( 21 Mar 2022 07:11 )             43.8     Hb Trend: 14.3<--    03-21    141  |  106  |  23  ----------------------------<  139<H>  4.0   |  24  |  0.88    Ca    8.8      21 Mar 2022 07:14  Mg     1.9     03-20    TPro  7.1  /  Alb  4.0  /  TBili  1.2  /  DBili  x   /  AST  22  /  ALT  19  /  AlkPhos  47  03-20    Creatinine Trend: 0.88<--, 0.93<--    Coags:      proBNP:   Lipid Profile:   HgA1c:   TSH: Thyroid Stimulating Hormone, Serum: 5.41 uIU/mL (03-21 @ 08:43)          PHYSICAL EXAM:  T(C): 36.4 (03-21-22 @ 11:28), Max: 36.9 (03-20-22 @ 20:51)  HR: 68 (03-21-22 @ 11:28) (68 - 84)  BP: 105/67 (03-21-22 @ 11:28) (105/67 - 129/72)  RR: 18 (03-21-22 @ 11:28) (16 - 18)  SpO2: 96% (03-21-22 @ 11:28) (94% - 99%)  Wt(kg): --   BMI (kg/m2): 32 (03-20-22 @ 09:22)  I&O's Summary    20 Mar 2022 07:01  -  21 Mar 2022 07:00  --------------------------------------------------------  IN: 100 mL / OUT: 250 mL / NET: -150 mL    21 Mar 2022 07:01  -  21 Mar 2022 12:03  --------------------------------------------------------  IN: 360 mL / OUT: 750 mL / NET: -390 mL        Gen: Appears well in NAD  HEENT:  (-)icterus (-)pallor  CV: N S1 S2 1/6 RAIN (+)2 Pulses B/l  Resp:  Clear to auscultation B/L, normal effort  GI: (+) BS Soft, NT, ND  Lymph:  (-)Edema, (-)obvious lymphadenopathy  Skin: Warm to touch, Normal turgor  Psych: Appropriate mood and affect      TELEMETRY: AF 	      ECG: AF, RBBB 	    RADIOLOGY:         CXR: < from: Xray Chest 1 View- PORTABLE-Urgent (Xray Chest 1 View- PORTABLE-Urgent .) (03.20.22 @ 10:15) >  IMPRESSION: Clear lungs.    < end of copied text >      ASSESSMENT/PLAN: Patient is a 70 y/o male with PMH HTN, HLD, chronic afib (not on AC?), CAD s/p FELA to mid LAD and OM1 (1/17/20), and prostatitis who presented with RLE heaviness and chest pressure. Cardiology consulted for further evaluation.     - MI ruled out, no acute ischemic EKG changes  - Continue ASA/Statin/Toprol XL for known CAD with stents  - Will need to clarify why patient is not on AC for known Afib  - Neuro eval - MRI Brain pending  - Abx per med  - Check TTE and Pharm NST    Burak Morrison PA-C  Pager: [phone]   C A R D I O L O G Y  *********************    DATE OF SERVICE: 03-21-22    HISTORY OF PRESENT ILLNESS: HPI:  Patient is a 70 y/o male with PMH HTN, HLD, chronic afib (not on AC?), CAD s/p FELA to mid LAD and OM1 (1/17/20), and prostatitis who presented with RLE heaviness and chest pressure. Cardiology consulted for further evaluation. Pt states around 8am yesterday while sitting he developed right leg "heaviness". States he could still move his leg and ambulate, but that he felt heavier to him. No hx of stroke, DVT, or PE. Has not taken ASA for 2 days. Has not taken Plavix or AC in 2 months. Pt also notes that he had substernal chest pain last night after getting "emotional", which resolved last night. Pain did not radiate, was not exertional or pleuritic, and had no modifying factors. He states he has felt "unwell" for the past few days with intense dysuria. He had prostatitis in the past so he started taking Cipro and Levaquin 2 days ago. He states that a few days ago when he started with dysuria he also had shaking chills which resolved since. Currently denies active chest pain. No recent stress test. Denies palpitations, SOB, dizziness, or syncope.      PAST MEDICAL & SURGICAL HISTORY:  HTN (hypertension)    HLD (hyperlipidemia)    Diabetes 1.5, managed as type 2    PVD (peripheral vascular disease)    Atrial fibrillation    CAD (coronary artery disease)    No significant past surgical history      MEDICATIONS:  MEDICATIONS  (STANDING):  aspirin enteric coated 81 milliGRAM(s) Oral daily  atorvastatin 40 milliGRAM(s) Oral at bedtime  cefTRIAXone   IVPB 1000 milliGRAM(s) IV Intermittent every 24 hours  dextrose 40% Gel 15 Gram(s) Oral once  dextrose 5%. 1000 milliLiter(s) (50 mL/Hr) IV Continuous <Continuous>  dextrose 5%. 1000 milliLiter(s) (100 mL/Hr) IV Continuous <Continuous>  dextrose 50% Injectable 25 Gram(s) IV Push once  dextrose 50% Injectable 12.5 Gram(s) IV Push once  dextrose 50% Injectable 25 Gram(s) IV Push once  glucagon  Injectable 1 milliGRAM(s) IntraMuscular once  heparin   Injectable 5000 Unit(s) SubCutaneous every 8 hours  influenza  Vaccine (HIGH DOSE) 0.7 milliLiter(s) IntraMuscular once  insulin lispro (ADMELOG) corrective regimen sliding scale   SubCutaneous three times a day before meals  insulin lispro (ADMELOG) corrective regimen sliding scale   SubCutaneous at bedtime  lisinopril 10 milliGRAM(s) Oral daily  metoprolol succinate ER 50 milliGRAM(s) Oral daily      Allergies    No Known Allergies    Intolerances        FAMILY HISTORY:  FH: congestive heart failure  In Mother      Non-contributary for premature coronary disease or sudden cardiac death    SOCIAL HISTORY:    [x ] Non-smoker  [ ] Smoker  [ ] Alcohol    FLU VACCINE THIS YEAR STARTS IN AUGUST:  [ ] Yes    [ ] No    IF OVER 65 HAVE YOU EVER HAD A PNA VACCINE:  [ ] Yes    [ ] No       [ ] N/A      REVIEW OF SYSTEMS:  [ x]chest pain  [  ]shortness of breath  [  ]palpitations  [  ]syncope  [ ]near syncope [ ]upper extremity weakness   [x ] lower extremity weakness  [  ]diplopia  [  ]altered mental status   [  ]fevers  [ ]chills [ ]nausea  [ ]vomiting  [  ]dysphagia    [ ]abdominal pain  [ ]melena  [ ]BRBPR    [  ]epistaxis  [  ]rash    [ ]lower extremity edema        [X] All others negative	  [ ] Unable to obtain      LABS:	 	    CARDIAC MARKERS:  CARDIAC MARKERS ( 20 Mar 2022 11:35 )  x     / x     / x     / x     / 8.3 ng/mL  CARDIAC MARKERS ( 20 Mar 2022 09:54 )  x     / x     / 207 U/L / x     / 8.4 ng/mL                              14.3   5.98  )-----------( 108      ( 21 Mar 2022 07:11 )             43.8     Hb Trend: 14.3<--    03-21    141  |  106  |  23  ----------------------------<  139<H>  4.0   |  24  |  0.88    Ca    8.8      21 Mar 2022 07:14  Mg     1.9     03-20    TPro  7.1  /  Alb  4.0  /  TBili  1.2  /  DBili  x   /  AST  22  /  ALT  19  /  AlkPhos  47  03-20    Creatinine Trend: 0.88<--, 0.93<--    Coags:      proBNP:   Lipid Profile:   HgA1c:   TSH: Thyroid Stimulating Hormone, Serum: 5.41 uIU/mL (03-21 @ 08:43)          PHYSICAL EXAM:  T(C): 36.4 (03-21-22 @ 11:28), Max: 36.9 (03-20-22 @ 20:51)  HR: 68 (03-21-22 @ 11:28) (68 - 84)  BP: 105/67 (03-21-22 @ 11:28) (105/67 - 129/72)  RR: 18 (03-21-22 @ 11:28) (16 - 18)  SpO2: 96% (03-21-22 @ 11:28) (94% - 99%)  Wt(kg): --   BMI (kg/m2): 32 (03-20-22 @ 09:22)  I&O's Summary    20 Mar 2022 07:01  -  21 Mar 2022 07:00  --------------------------------------------------------  IN: 100 mL / OUT: 250 mL / NET: -150 mL    21 Mar 2022 07:01  -  21 Mar 2022 12:03  --------------------------------------------------------  IN: 360 mL / OUT: 750 mL / NET: -390 mL        Gen: Appears well in NAD  HEENT:  (-)icterus (-)pallor  CV: N S1 S2 1/6 RAIN (+)2 Pulses B/l  Resp:  Clear to auscultation B/L, normal effort  GI: (+) BS Soft, NT, ND  Lymph:  (-)Edema, (-)obvious lymphadenopathy  Skin: Warm to touch, Normal turgor  Psych: Appropriate mood and affect      TELEMETRY: AF 	      ECG: AF, RBBB 	    RADIOLOGY:         CXR: < from: Xray Chest 1 View- PORTABLE-Urgent (Xray Chest 1 View- PORTABLE-Urgent .) (03.20.22 @ 10:15) >  IMPRESSION: Clear lungs.    < end of copied text >      ASSESSMENT/PLAN: Patient is a 70 y/o male with PMH HTN, HLD, chronic afib (not on AC?), CAD s/p FELA to mid LAD and OM1 (1/17/20), and prostatitis who presented with RLE heaviness and chest pressure. Cardiology consulted for further evaluation.     - MI ruled out, no acute ischemic EKG changes  - Continue ASA/Statin/Toprol XL for known CAD with stents  - Will need to clarify why patient is not on AC for known Afib  - Neuro eval - MRI Brain pending  - Abx per med  - Check TTE and Pharm NST  - Patient to f/u with his cardiologist Dr. Tuan Fishman after d/c    YI ChavezC  Pager: [phone]

## 2022-03-21 NOTE — CONSULT NOTE ADULT - ASSESSMENT
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 severe stenosis, not likely significant given vessel hypoplastic / R VA dominant  [x] Carotid Doppler: no HD sig stenosis   [] Trans-thoracic echocardiogram with bubble study to r/o PFO, evaluate EF  [] MR Brain ordered   [x] Continuous cardiac telemetry: has been rate controlled Afib (50-70) since admission   [x] Lab work: HgbA1C (6.0% but was A1c 12% ~2018), lipid panel (LDL 56 < 70)      Primary prevention of stroke:  [] defer to cardiology for appropriate AC in setting of Afib, should be on for stroke prevention   [x] c/w Atorvastatin 40 mg daily (long-term goal LDL < 70)   [x] Tight glucose control (long-term goal HgbA1c < 6%)  [x] Stroke education and counseling provided at bedside       Attending Attestation to Follow in AM.      IMPRESSION   RLE weakness/heaviness, resolved   unclear if transient ischemic attack , less suspicion for acute ischemic stroke given resolution, however in setting of risk factors (diabetic, family history of stroke, Afib not on AC) further evaluation / stroke prevention warranted    PLAN  [] fall precautions   [] consider vascular / podiatry evaluation of bL LEs / cyanotic appearing feet   [] consider Venous duplex of LEs to r/o DVTs  [x] Basic labs: CBC,CMP, coagulation panel and troponin    Stroke Workup:  [] Trans-thoracic echocardiogram with bubble study to r/o PFO, evaluate EF  [] MR Brain ordered   [x] CTH: No acute lesions or chronic lesions   [x] CTA: L VA with severe stenosis, not likely significant given vessel hypoplastic / R VA dominant  [x] Carotid Doppler: no HD sig stenosis   [x] Continuous cardiac telemetry: has been rate controlled Afib (50-70) since admission   [x] Lab work: HgbA1C (6.0% but was A1c 12% ~2018), lipid panel (LDL 56 < 70)    Primary prevention of stroke:  [] defer to cardiology for appropriate AC in setting of Afib, should be on for stroke prevention   [] Tight glucose control (long-term goal HgbA1c < 6%); consider Endocrine consult if patient has not had "DM type" workup or follow up given hx A1c 12% 2018  [x] c/w Atorvastatin 40 mg daily (long-term goal LDL < 70)   [x] Stroke education and counseling provided at bedside       Attending Attestation to Follow in AM.

## 2022-03-21 NOTE — CONSULT NOTE ADULT - ATTENDING COMMENTS
Briefly   72yo R-H  Male with HTN, HLD, Afib (not on AC - previously on xarelto many years ago), DM (A1c 12% in 2018), CAD x2 stents (not adherant , prostatitis, peripheral vascular disease s/p amputation of digit(s) on LLE, COVID 19 infxn s/p antibody infusion ~12/2021? presented to ED for mirage of health concerns including RLE heaviness/weakness, now resolved.   CTh with no acute findings. 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 can start heparin for now with plan to change to DOAC. prefer eliquis 5mg BID  - check blood cx  - ceftriaxone for UTI from retention. ID following   - ASA for stent   - lipitor 40mg daily; LDl goal <70   - MRI L with and w/o given known spine disease and RLE heaviness but better   - TTE  - telemetry  - PT/OT/SS/SLP, OOBC  - check FS, glucose control <180  - GI/DVT ppx  - Counseling on diet, exercise, and medication adherence was done  - Counseling on smoking cessation and alcohol consumption offered when appropriate.  - Pain assessed and judicious use of narcotics when appropriate was discussed.    - Stroke education given when appropriate.  - Importance of fall prevention discussed.   - Differential diagnosis and plan of care discussed with patient and/or family and primary team  - Thank you for allowing me to participate in the care of this patient. Call with questions.   Javier Joseph MD  Vascular Neurology  Office: [phone]

## 2022-03-21 NOTE — CONSULT NOTE ADULT - ASSESSMENT
CARDIOLOGY ATTENDING    Patient seen and examined. Agree with above. Given chest pain (no new ekg changes) would get echo and NST. Would also start lifelong a/c for AF when brain MRI is resulted.

## 2022-03-21 NOTE — PROVIDER CONTACT NOTE (OTHER) - ASSESSMENT
Pt voided 100ml Bladder scan 527ml, Pt voided 200ml Post void Bladder scan 633ml
Pt Voided 350ml, Post Void Residual 659ml, Pt voided again 125ml

## 2022-03-21 NOTE — CONSULT NOTE ADULT - ASSESSMENT
71 year old admitted for right leg weakness and neuro workup related to the latter  Also states that he had a shaking chill and dysuria last last week and self treated himself with Cipro for a uti  He has a history of prostatitis yrs ago  . no further fever or chills Has pyuria but culture was done after ab given.  also relates to inability to urinate and was found to be in rentention    continue ceftriaxone for now  await ct and prostate  ultrasound  indra UTI due to retention

## 2022-03-21 NOTE — CONSULT NOTE ADULT - SUBJECTIVE AND OBJECTIVE BOX
QUINCY MELLO | 71y | Male    HISTORY OF PRESENT ILLNESS:  72yo R-H Male PMHx HTN/HLD, Afib (no AC), DM (A1c 12% in 2018), CAD x2 stents on ASA81 ? compliance, prostatitis, peripheral vascular disease s/p L digit amputation, presented to ED for RLE "heaviness".  Patient states he has been suffering from prostatitis but has neglected his health for some time in setting of work as healthcare provider, was debating coming into the hospital on  and decided after noticing his gait felt "off" that he would come in for evaluation. Denied wide based gait, dizziness RLE symptoms have resolved, states has had longstanding history of Charcot foot b/L which makes him unstable; usually carries bag in L hand to equilibrate "rocking" sensation while ambulating. Reports stocking-glove like sensory changes b/L. Denies nausea/vomiting, headache, dizziness, acute visual changes (~chronic R cataract, pending surgery 3/31), weakness of arms, facial asymmetry. 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 Pertaining to Afib,  had procedure for Afib in past and was on Xarelto for few years but had insurance issues and was paying out of pocket. Can not recall if he stopped AC or was told to stop. After his stents were placed, was on ASA/Plavix for some time before transitioning to ASA alone. Reports intermittent palpitations and currently in Afib on telemetry. Denies stroke history, does have family history of stroke. Known diabetic with strong family history. Nonsmoker, no prior strokes or TIAs, no DVTs.     ROS: A 10-system ROS was negative outside those listed in HPI.    PAST MEDICAL & SURGICAL HISTORY:  HTN (hypertension)  HLD (hyperlipidemia)  Diabetes 1.5, managed as type 2  PVD (peripheral vascular disease)  Atrial fibrillation  CAD (coronary artery disease)    No significant past surgical history      FAMILY HISTORY:  FH: congestive heart failure  In Mother     Stroke history in mother's sister and       SOCIAL HISTORY:     Marital Status: (  )   (  ) Single  (  )   (  )      Occupation:      Lives: (  ) alone  (  ) with children   (  ) with spouse  (  ) with parents  (  ) other     Illicit Drug Use: (  ) never used  (  ) other _____     Tobacco Use:  (  ) never smoked  (  ) former smoker  (  ) current smoker  (  ) pack year  (  ) last cigarette date     Alcohol Use:      Sexual History:        Home Medications:  atorvastatin 40 mg oral tablet: 1 tab(s) orally once a day (20 Mar 2022 14:56)  Cipro 500 mg oral tablet: 1 tab(s) orally every 12 hours (20 Mar 2022 14:56)  levoFLOXacin 500 mg oral tablet: 1 tab(s) orally every 24 hours (20 Mar 2022 14:56)  metFORMIN 500 mg oral tablet: 1 tab(s) orally 2 times a day    note: last fill  #30 days (20 Mar 2022 14:56)  metoprolol succinate 50 mg oral tablet, extended release: 1 tab(s) orally once a day (20 Mar 2022 14:56)  ramipril 10 mg oral capsule: 1 cap(s) orally 2 times a day (20 Mar 2022 14:56)    MEDICATIONS  (STANDING):  aspirin enteric coated 81 milliGRAM(s) Oral daily  atorvastatin 40 milliGRAM(s) Oral at bedtime  cefTRIAXone   IVPB 1000 milliGRAM(s) IV Intermittent every 24 hours  dextrose 40% Gel 15 Gram(s) Oral once  dextrose 5%. 1000 milliLiter(s) (50 mL/Hr) IV Continuous <Continuous>  dextrose 5%. 1000 milliLiter(s) (100 mL/Hr) IV Continuous <Continuous>  dextrose 50% Injectable 25 Gram(s) IV Push once  dextrose 50% Injectable 12.5 Gram(s) IV Push once  dextrose 50% Injectable 25 Gram(s) IV Push once  glucagon  Injectable 1 milliGRAM(s) IntraMuscular once  heparin   Injectable 5000 Unit(s) SubCutaneous every 8 hours  influenza  Vaccine (HIGH DOSE) 0.7 milliLiter(s) IntraMuscular once  insulin lispro (ADMELOG) corrective regimen sliding scale   SubCutaneous three times a day before meals  insulin lispro (ADMELOG) corrective regimen sliding scale   SubCutaneous at bedtime  lisinopril 10 milliGRAM(s) Oral daily  metoprolol succinate ER 50 milliGRAM(s) Oral daily    MEDICATIONS  (PRN):      Allergies  No Known Allergies    Intolerances      OBJECTIVE:  Vital Signs Last 24 Hrs  T(C): 36.4 (21 Mar 2022 11:28), Max: 36.9 (20 Mar 2022 20:51)  T(F): 97.5 (21 Mar 2022 11:28), Max: 98.4 (20 Mar 2022 20:51)  HR: 68 (21 Mar 2022 11:28) (68 - 84)  BP: 105/67 (21 Mar 2022 11:28) (105/67 - 129/72)  BP(mean): --  RR: 18 (21 Mar 2022 11:28) (16 - 18)  SpO2: 96% (21 Mar 2022 11:28) (94% - 99%)    PHYSICAL EXAM:  Constitutional-  Cardiov-  Pulm-   Neurological Special Tests-  MS/Language/Speech-  CN-  Motor-  Sensation-  Reflexes-  Cerebellar-  Gait-    LABORATORY, IMAGING, ADDITIONAL STUDIES:  CBC                       14.3   5.98  )-----------( 108      ( 21 Mar 2022 07:11 )             43.8     Chem -    141  |  106  |  23  ----------------------------<  139<H>  4.0   |  24  |  0.88    Ca    8.8      21 Mar 2022 07:14  Mg     1.9     20    TPro  7.1  /  Alb  4.0  /  TBili  1.2  /  DBili  x   /  AST  22  /  ALT  19  /  AlkPhos  47  03-20    LFTs LIVER FUNCTIONS - ( 20 Mar 2022 09:54 )  Alb: 4.0 g/dL / Pro: 7.1 g/dL / ALK PHOS: 47 U/L / ALT: 19 U/L / AST: 22 U/L / GGT: x           Coagulopathy PT/INR - ( 20 Mar 2022 09:54 )   PT: 16.0 sec;   INR: 1.39 ratio         PTT - ( 20 Mar 2022 09:54 )  PTT:33.2 sec  Lipid Panel  Chol 103 LDL -- HDL 26<L> Trig 104  Cardiac enzymes CARDIAC MARKERS ( 20 Mar 2022 11:35 )  x     / x     / x     / x     / 8.3 ng/mL  CARDIAC MARKERS ( 20 Mar 2022 09:54 )  x     / x     / 207 U/L / x     / 8.4 ng/mL      U/A Urinalysis Basic - ( 20 Mar 2022 11:02 )    Color: Dark Yellow / Appearance: Slightly Turbid / S.010 / pH: x  Gluc: x / Ketone: Negative  / Bili: Negative / Urobili: 2 mg/dL   Blood: x / Protein: Trace / Nitrite: Positive   Leuk Esterase: Large / RBC: 3 /hpf /  /HPF   Sq Epi: x / Non Sq Epi: 0 /hpf / Bacteria: Negative           QUINCY MELLO | 71y | Male    HISTORY OF PRESENT ILLNESS:  72yo R-H Male PMHx HTN/HLD, Afib (not on AC), DM (A1c 12% in 2018), CAD x2 stents (on ASA81 ? compliance), prostatitis, peripheral vascular disease s/p amputation of digit(s) on LLE, COVID 19 infxn s/p antibody infusion ~2021? presented to ED for mirage of health concerns including RLE heaviness/weakness, now resolved.     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 Was in [facility] recently for work and had a bladder scan then which was concerning for retention, unclear if BPH? and had symptoms of UTI so had prescribed short course of antibiotics. He was debating coming into the hospital on  and decided after noticing his gait felt "off" that he would come in for evaluation. Patient described leg warmth yesterday with sensation of heaviness but was able to ambulate with usually amount of unsteadiness in setting of longstanding history of Charcot foot b/L. Denies wide based gait, dizziness, dragging of leg but reports may have had foot drop but has all since resolved. Pt usually carries bag in L hand to equilibrate chronic "rocking" sensation while ambulating. Reports stocking-glove like sensory changes b/L. Denies nausea/vomiting, headaches, dizziness, acute visual changes (~chronic R cataract, pending surgery 3/31), weakness of arms, facial asymmetry. Denies stroke history, does have family history of stroke. Known diabetic with strong family history. Blood pressure usually on lower side per patient. Nonsmoker, no prior strokes or TIAs, no DVTs. Reports non-pulsatile tinnitus bL since COVID w/o ear pain, fullness.     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 Pertaining to Afib, states had procedure for Afib in past and was on Xarelto for few years but had insurance issues and was paying out of pocket. Can not recall if he stopped AC or was told to stop. After his stents were placed, was on ASA/Plavix for some time before transitioning to ASA alone. Reports intermittent palpitations and currently in Afib on telemetry.     ROS: A 10-system ROS was negative outside those listed in HPI.    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      Lives: (  ) alone  (  ) with children   (  ) with spouse  (  ) with parents  (  ) other     Illicit Drug Use: ( x ) never used  (  ) other _____     Tobacco Use:  ( x ) never smoked  (  ) former smoker  (  ) current smoker  (  ) pack year  (  ) last cigarette date     Alcohol Use:      Sexual History:        Home Medications:  atorvastatin 40 mg oral tablet: 1 tab(s) orally once a day (20 Mar 2022 14:56)  Cipro 500 mg oral tablet: 1 tab(s) orally every 12 hours (20 Mar 2022 14:56)  levoFLOXacin 500 mg oral tablet: 1 tab(s) orally every 24 hours (20 Mar 2022 14:56)  metFORMIN 500 mg oral tablet: 1 tab(s) orally 2 times a day    note: last fill  #30 days (20 Mar 2022 14:56)  metoprolol succinate 50 mg oral tablet, extended release: 1 tab(s) orally once a day (20 Mar 2022 14:56)  ramipril 10 mg oral capsule: 1 cap(s) orally 2 times a day (20 Mar 2022 14:56)    MEDICATIONS  (STANDING):  aspirin enteric coated 81 milliGRAM(s) Oral daily  atorvastatin 40 milliGRAM(s) Oral at bedtime  cefTRIAXone   IVPB 1000 milliGRAM(s) IV Intermittent every 24 hours  dextrose 40% Gel 15 Gram(s) Oral once  dextrose 5%. 1000 milliLiter(s) (50 mL/Hr) IV Continuous <Continuous>  dextrose 5%. 1000 milliLiter(s) (100 mL/Hr) IV Continuous <Continuous>  dextrose 50% Injectable 25 Gram(s) IV Push once  dextrose 50% Injectable 12.5 Gram(s) IV Push once  dextrose 50% Injectable 25 Gram(s) IV Push once  glucagon  Injectable 1 milliGRAM(s) IntraMuscular once  heparin   Injectable 5000 Unit(s) SubCutaneous every 8 hours  influenza  Vaccine (HIGH DOSE) 0.7 milliLiter(s) IntraMuscular once  insulin lispro (ADMELOG) corrective regimen sliding scale   SubCutaneous three times a day before meals  insulin lispro (ADMELOG) corrective regimen sliding scale   SubCutaneous at bedtime  lisinopril 10 milliGRAM(s) Oral daily  metoprolol succinate ER 50 milliGRAM(s) Oral daily    MEDICATIONS  (PRN):      Allergies  No Known Allergies    Intolerances      OBJECTIVE:  Vital Signs Last 24 Hrs  T(C): 36.4 (21 Mar 2022 11:28), Max: 36.9 (20 Mar 2022 20:51)  T(F): 97.5 (21 Mar 2022 11:28), Max: 98.4 (20 Mar 2022 20:51)  HR: 68 (21 Mar 2022 11:28) (68 - 84)  BP: 105/67 (21 Mar 2022 11:28) (105/67 - 129/72)  BP(mean): --  RR: 18 (21 Mar 2022 11:28) (16 - 18)  SpO2: 96% (21 Mar 2022 11:28) (94% - 99%)    PHYSICAL EXAM:  Constitutional- NAD, sitting up in chair   Cardiov- telemetry + afib, rate 50-70  Pulm- regular rise and fall of chest wall on room air  LEs - pitting edema in bL feet with cyanotic appearing toes, feet that are cool to touch  NEUROLOGICAL EXAM  - Mental Status:  AAOx3; speech is fluent with intact naming, repetition, and comprehension  - Cranial Nerves II-XII:    II:  PERRLA; visual fields are full to confrontation in R eye ; L eye pending cataract surgery asked to skip as severely decreased globally x months  III, IV, VI:  EOMI, no nystagmus  V:  facial sensation is intact in the V1-V3 distribution bilaterally.  VII:  face is asymmetric at baseline per patient, with normal eye closure and smile ; low laying eyelids chronic and not clear if worsens throughout day per patient  VIII:  hearing is intact to finger rub  IX, X:  uvula is midline and soft palate rises symmetrically  XI:  head turning and shoulder shrug are intact bilaterally  XII:  tongue protrudes in the midline  - Motor:  strength is 5/5 throughout bL UEs, 4+/5 bL LEs w normal muscle bulk and tone throughout; no pronator drift  - Reflexes:  2 and symmetric at the biceps, triceps, brachioradialis; 1+ at knees, and 0 ankles;  plantar reflexes neutral bilaterally  - Sensory:  intact to light touch, pin prick sense throughout equally   - Coordination:  finger-nose-finger and HTS without dysmetria   - Gait: normal base, small steps with some unsteadiness in setting of peripheral neuropathy; able to stand on toes / lift heels for short period of time     LABORATORY, IMAGING, ADDITIONAL STUDIES:  CBC                       14.3   5.98  )-----------( 108      ( 21 Mar 2022 07:11 )             43.8     Chem -    141  |  106  |  23  ----------------------------<  139<H>  4.0   |  24  |  0.88    Ca    8.8      21 Mar 2022 07:14  Mg     1.9     -20    TPro  7.1  /  Alb  4.0  /  TBili  1.2  /  DBili  x   /  AST  22  /  ALT  19  /  AlkPhos  47  03-20    LFTs LIVER FUNCTIONS - ( 20 Mar 2022 09:54 )  Alb: 4.0 g/dL / Pro: 7.1 g/dL / ALK PHOS: 47 U/L / ALT: 19 U/L / AST: 22 U/L / GGT: x           Coagulopathy PT/INR - ( 20 Mar 2022 09:54 )   PT: 16.0 sec;   INR: 1.39 ratio         PTT - ( 20 Mar 2022 09:54 )  PTT:33.2 sec  Lipid Panel - Chol 103 LDL -- HDL 26<L> Trig 104  Cardiac enzymes CARDIAC MARKERS ( 20 Mar 2022 11:35 )  x     / x     / x     / x     / 8.3 ng/mL  CARDIAC MARKERS ( 20 Mar 2022 09:54 )  x     / x     / 207 U/L / x     / 8.4 ng/mL      U/A Urinalysis Basic - ( 20 Mar 2022 11:02 )    Color: Dark Yellow / Appearance: Slightly Turbid / S.010 / pH: x  Gluc: x / Ketone: Negative  / Bili: Negative / Urobili: 2 mg/dL   Blood: x / Protein: Trace / Nitrite: Positive   Leuk Esterase: Large / RBC: 3 /hpf /  /HPF   Sq Epi: x / Non Sq Epi: 0 /hpf / Bacteria: Negative

## 2022-03-21 NOTE — PROVIDER CONTACT NOTE (OTHER) - ACTION/TREATMENT ORDERED:
As per Dr. Garcia, no further bladder scans, as pt was given flomax and is voided, Will check again tomorrow. Uncoming RN Aware.
Provider made aware, as per Dr. Garcia, do not straight cath, check Bladder scan again prior to change of shift.

## 2022-03-22 DIAGNOSIS — I63.9 CEREBRAL INFARCTION, UNSPECIFIED: ICD-10-CM

## 2022-03-22 DIAGNOSIS — R33.9 RETENTION OF URINE, UNSPECIFIED: ICD-10-CM

## 2022-03-22 LAB
ANION GAP SERPL CALC-SCNC: 11 MMOL/L — SIGNIFICANT CHANGE UP (ref 5–17)
APTT BLD: 71.7 SEC — HIGH (ref 27.5–35.5)
APTT BLD: 92.8 SEC — HIGH (ref 27.5–35.5)
BUN SERPL-MCNC: 24 MG/DL — HIGH (ref 7–23)
CALCIUM SERPL-MCNC: 8.9 MG/DL — SIGNIFICANT CHANGE UP (ref 8.4–10.5)
CHLORIDE SERPL-SCNC: 104 MMOL/L — SIGNIFICANT CHANGE UP (ref 96–108)
CO2 SERPL-SCNC: 25 MMOL/L — SIGNIFICANT CHANGE UP (ref 22–31)
CREAT SERPL-MCNC: 1.05 MG/DL — SIGNIFICANT CHANGE UP (ref 0.5–1.3)
EGFR: 76 ML/MIN/1.73M2 — SIGNIFICANT CHANGE UP
GLUCOSE BLDC GLUCOMTR-MCNC: 104 MG/DL — HIGH (ref 70–99)
GLUCOSE BLDC GLUCOMTR-MCNC: 120 MG/DL — HIGH (ref 70–99)
GLUCOSE BLDC GLUCOMTR-MCNC: 125 MG/DL — HIGH (ref 70–99)
GLUCOSE BLDC GLUCOMTR-MCNC: 130 MG/DL — HIGH (ref 70–99)
GLUCOSE SERPL-MCNC: 106 MG/DL — HIGH (ref 70–99)
HCT VFR BLD CALC: 45.9 % — SIGNIFICANT CHANGE UP (ref 39–50)
HGB BLD-MCNC: 14.6 G/DL — SIGNIFICANT CHANGE UP (ref 13–17)
MCHC RBC-ENTMCNC: 30.8 PG — SIGNIFICANT CHANGE UP (ref 27–34)
MCHC RBC-ENTMCNC: 31.8 GM/DL — LOW (ref 32–36)
MCV RBC AUTO: 96.8 FL — SIGNIFICANT CHANGE UP (ref 80–100)
NRBC # BLD: 0 /100 WBCS — SIGNIFICANT CHANGE UP (ref 0–0)
PLATELET # BLD AUTO: 128 K/UL — LOW (ref 150–400)
POTASSIUM SERPL-MCNC: 3.7 MMOL/L — SIGNIFICANT CHANGE UP (ref 3.5–5.3)
POTASSIUM SERPL-SCNC: 3.7 MMOL/L — SIGNIFICANT CHANGE UP (ref 3.5–5.3)
RBC # BLD: 4.74 M/UL — SIGNIFICANT CHANGE UP (ref 4.2–5.8)
RBC # FLD: 14.6 % — HIGH (ref 10.3–14.5)
SODIUM SERPL-SCNC: 140 MMOL/L — SIGNIFICANT CHANGE UP (ref 135–145)
WBC # BLD: 5.41 K/UL — SIGNIFICANT CHANGE UP (ref 3.8–10.5)
WBC # FLD AUTO: 5.41 K/UL — SIGNIFICANT CHANGE UP (ref 3.8–10.5)

## 2022-03-22 PROCEDURE — 93306 TTE W/DOPPLER COMPLETE: CPT | Mod: 26

## 2022-03-22 PROCEDURE — 72158 MRI LUMBAR SPINE W/O & W/DYE: CPT | Mod: 26

## 2022-03-22 PROCEDURE — 99232 SBSQ HOSP IP/OBS MODERATE 35: CPT

## 2022-03-22 PROCEDURE — 76872 US TRANSRECTAL: CPT | Mod: 26

## 2022-03-22 RX ADMIN — HEPARIN SODIUM 1700 UNIT(S)/HR: 5000 INJECTION INTRAVENOUS; SUBCUTANEOUS at 19:26

## 2022-03-22 RX ADMIN — HEPARIN SODIUM 1700 UNIT(S)/HR: 5000 INJECTION INTRAVENOUS; SUBCUTANEOUS at 02:57

## 2022-03-22 RX ADMIN — Medication 1 ENEMA: at 13:48

## 2022-03-22 RX ADMIN — Medication 81 MILLIGRAM(S): at 13:09

## 2022-03-22 RX ADMIN — CEFTRIAXONE 100 MILLIGRAM(S): 500 INJECTION, POWDER, FOR SOLUTION INTRAMUSCULAR; INTRAVENOUS at 06:58

## 2022-03-22 RX ADMIN — HEPARIN SODIUM 1700 UNIT(S)/HR: 5000 INJECTION INTRAVENOUS; SUBCUTANEOUS at 10:18

## 2022-03-22 RX ADMIN — TAMSULOSIN HYDROCHLORIDE 0.8 MILLIGRAM(S): 0.4 CAPSULE ORAL at 22:02

## 2022-03-22 RX ADMIN — LISINOPRIL 10 MILLIGRAM(S): 2.5 TABLET ORAL at 05:17

## 2022-03-22 RX ADMIN — Medication 50 MILLIGRAM(S): at 05:17

## 2022-03-22 RX ADMIN — ATORVASTATIN CALCIUM 40 MILLIGRAM(S): 80 TABLET, FILM COATED ORAL at 22:02

## 2022-03-22 RX ADMIN — HEPARIN SODIUM 1700 UNIT(S)/HR: 5000 INJECTION INTRAVENOUS; SUBCUTANEOUS at 23:09

## 2022-03-22 NOTE — CONSULT NOTE ADULT - ASSESSMENT
71M with multiple medical problems, admitted for LE heaviness found to have UTI, stroke.   - continue empiric abx, fu urine culture  - started on flomax - would continue   -fu CT scan results  - Patient should undergo true post void residual with bladder scan. If >350 recommend placement of joya catheter  - Discussed with patient that if elevated residuals options include catheter drainage of 4x a day self cath  - outpatient  fu with consideration for urodynamics if he is in retention  - all questions answered at bedside    Hardeep Salazar MD  [facility]  [address] [place] [id number] [phone]

## 2022-03-22 NOTE — DISCHARGE NOTE NURSING/CASE MANAGEMENT/SOCIAL WORK - NSDCPEFALRISK_GEN_ALL_CORE
For information on Fall & Injury Prevention, visit: [email or web address] OR  [email or web address] OR  https://www.cdc.gov/steadi/patient.html

## 2022-03-22 NOTE — PHYSICAL THERAPY INITIAL EVALUATION ADULT - PRECAUTIONS/LIMITATIONS, REHAB EVAL
CONTINUED: Has not taken Plavix or AC in 2 months. Pt also notes that he had substernal chest pain last night after getting "emotional", which resolved last night. Pain did not radiate, was not exertional or pleuritic, and had no modifying factors. He states he has felt "unwell" for the past few days with intense dysuria.  He states that a few days ago when he started with dysuria he also had shaking chills which resolved since.

## 2022-03-22 NOTE — OCCUPATIONAL THERAPY INITIAL EVALUATION ADULT - LIVES WITH, PROFILE
lives with spouse in apt with elevator access, pt reports ambulating with suitcase for balance outside of home, pt owns SC however does not currently use. Pt has orthotic shoes 2* to Charcot feet. Pt independent with ADLs except donning socks/shoes.

## 2022-03-22 NOTE — OCCUPATIONAL THERAPY INITIAL EVALUATION ADULT - ANTICIPATED DISCHARGE DISPOSITION, OT EVAL
Home with home OT for home safety evaluation, balance, strength and ADLs. Home with supervision/assist for functional activities and ADLs as needed.

## 2022-03-22 NOTE — PHYSICAL THERAPY INITIAL EVALUATION ADULT - DIAGNOSIS, PT EVAL
Pt presents with mild impairments in Pt presents with mild impairments in strength, balance and endurance all impacting ability to perform ADLs and functional mobility

## 2022-03-22 NOTE — PHYSICAL THERAPY INITIAL EVALUATION ADULT - LEVEL OF CONSCIOUSNESS, REHAB EVAL
[facility]

              [address] [place] [address]

Test Date:    2021-10-18               Test Time:    15:01:56

Pat Name:     OSMIN BARRAZA           Department:   

Patient ID:   [id number]           Room:          

Gender:       M                        Technician:   

:          1956               Requested By: RICHARD PAK

Order Number: [id number]           Reading MD:   Terrence Cooper MD

                                 Measurements

Intervals                              Axis          

Rate:         65                       P:            161

AL:           186                      QRS:          101

QRSD:         104                      T:            144

QT:           380                                    

QTc:          400                                    

                           Interpretive Statements

SINUS RHYTHM

RBBB

LPFB

Electronically Signed On 10- 9:06:39 CDT by Terrence Cooper MD alert

## 2022-03-22 NOTE — OCCUPATIONAL THERAPY INITIAL EVALUATION ADULT - ADDITIONAL COMMENTS
Has not taken Plavix or AC in 2 months. Pt also notes that he had substernal chest pain last night after getting "emotional", which resolved last night. Pain did not radiate, was not exertional or pleuritic, and had no modifying factors. He states he has felt "unwell" for the past few days with intense dysuria.  He states that a few days ago when he started with dysuria he also had shaking chills which resolved since.CT Angio: NONCONTRAST HEAD CT SCAN: No CT evidence of acute intracranial hemorrhage, mass effect or acute territorial infarct. Mild mucosal thickening in the anterior LEFT ethmoid sinuses and LEFT maxillary sinus.

## 2022-03-22 NOTE — OCCUPATIONAL THERAPY INITIAL EVALUATION ADULT - PERTINENT HX OF CURRENT PROBLEM, REHAB EVAL
71 year old male PMH HTN, HLD, A fib, CAD on ASA daily, prostatitis, presents to ED for RLE "heaviness". Pt states around 8am today while sitting he developed right leg "heaviness". States he could still move his leg and ambulate, but that he felt heavier to him. No hx of stroke, DVT, or PE. Has not taken ASA for 2 days.

## 2022-03-22 NOTE — OCCUPATIONAL THERAPY INITIAL EVALUATION ADULT - NS ASR FOLLOW COMMAND OT EVAL
Pt scored 2 out of 28 on the Short Blessed Test signifying normal cognition/100% of the time/able to follow multistep instructions

## 2022-03-22 NOTE — CONSULT NOTE ADULT - SUBJECTIVE AND OBJECTIVE BOX
Urology Consult Note    Chief Complaint: weakness      History of Present Illness: 71 year old male PMH HTN, HLD, A fib (no AC), CAD on ASA daily, prostatitis, presents to ED for RLE "heaviness". He states he has felt "unwell" for the past few days with intense dysuria. He had prostatitis in the past so he started taking Cipro and Levaquin 2 days ago. He states that a few days ago when he started with dysuria he also had shaking chills which resolved since. he states it has been harder to void with weaker stream over past several days. No previous history of urianry retention. Not on any urinary medication. UA here grossly positive. CT scan pending but MRI brrain showing infarct.       PAST MEDICAL & SURGICAL HISTORY:  HTN (hypertension)    HLD (hyperlipidemia)    Diabetes 1.5, managed as type 2    PVD (peripheral vascular disease)    Atrial fibrillation    CAD (coronary artery disease)    No significant past surgical history        FAMILY HISTORY:  FH: congestive heart failure  In Mother        Allergies    No Known Allergies    Intolerances        Social History:  Denies tobacco or alcohol use    Review of Systems:   Constitutional: No weight loss, no weakness  HEENT: No visual loss, no hearing loss, no sneezing  Skin: No rash or itching  CV: No chest pain, no chest pressure  Pulm: No shortness of breath, cough, or sputum  GI: No melena, no constipation  : Per HPI  Neuro: No headache, dizziness  MSK: No muscle pain, no joint pain  Heme: No anemia, bruising, or bleeding  Lymphatics: No enlarged nodes, no history of splenectomy  Psych: No depression of anxiety  Endo: No cold or heat intolerance  Allergies: No asthma, hives    Physical Exam:  Vital signs  T(C): 36.8 (22 @ 05:15), Max: 36.8 (22 @ 05:15)  HR: 71 (22 @ 05:15)  BP: 113/64 (22 @ 05:15)  SpO2: 96% (22 @ 05:15)  Wt(kg): --    Gen: No acute distress. Normal mood  HEENT: Normocephalic, neck supple  CV: Hemodynamically stable  Pulm: No increased work of breathing  Abd: soft, non-tender, non-distended  Back: No CVA tenderness, no midline pain  Extremities: No significant deformity or joint abnormality  Neuro: Alert & oriented x3, No focal deficits  Skin: Skin normal color, normal texture  Psych: Normal affect, normal behavior  : Nonpalpable bladder      Labs:       @ 06:49    WBC 5.41  / Hct 45.9  / SCr 1.05      @ 07:14    WBC --    / Hct --    / SCr 0.88         140  |  104  |  24<H>  ----------------------------<  106<H>  3.7   |  25  |  1.05    Ca    8.9      22 Mar 2022 06:49  Mg     1.9         TPro  7.1  /  Alb  4.0  /  TBili  1.2  /  DBili  x   /  AST  22  /  ALT  19  /  AlkPhos  47  03-20    PT/INR - ( 20 Mar 2022 09:54 )   PT: 16.0 sec;   INR: 1.39 ratio         PTT - ( 22 Mar 2022 02:04 )  PTT:71.7 sec  Urinalysis Basic - ( 20 Mar 2022 11:02 )    Color: Dark Yellow / Appearance: Slightly Turbid / S.010 / pH: x  Gluc: x / Ketone: Negative  / Bili: Negative / Urobili: 2 mg/dL   Blood: x / Protein: Trace / Nitrite: Positive   Leuk Esterase: Large / RBC: 3 /hpf /  /HPF   Sq Epi: x / Non Sq Epi: 0 /hpf / Bacteria: Negative

## 2022-03-22 NOTE — DISCHARGE NOTE NURSING/CASE MANAGEMENT/SOCIAL WORK - PATIENT PORTAL LINK FT
You can access the FollowMyHealth Patient Portal offered by [facility] by registering at the following website: [email or web address]. By joining [facility]’s FollowMyHealth portal, you will also be able to view your health information using other applications (apps) compatible with our system. Billing Type: Third-Party Bill

## 2022-03-22 NOTE — PHYSICAL THERAPY INITIAL EVALUATION ADULT - GENERAL OBSERVATIONS, REHAB EVAL
Pt received sitting up in bedside chair in NAD, +IVL, VSS, agreeable to participate in therapy at this time Pt received sitting up in bedside chair in NAD, +IVF-Heparin, VSS, agreeable to participate in therapy at this time

## 2022-03-22 NOTE — OCCUPATIONAL THERAPY INITIAL EVALUATION ADULT - DIAGNOSIS, OT EVAL
Pt demonstrated decreased strength and balance impacting pt's ability to participate in functional mobility and ADLs.

## 2022-03-22 NOTE — CONSULT NOTE ADULT - REASON FOR ADMISSION
chest and right leg heaviness

## 2022-03-22 NOTE — PHYSICAL THERAPY INITIAL EVALUATION ADULT - ADDITIONAL COMMENTS
CT Angio: NONCONTRAST HEAD CT SCAN: No CT evidence of acute intracranial hemorrhage, mass effect or acute territorial infarct. Mild mucosal thickening in the anterior LEFT ethmoid sinuses and LEFT maxillary sinus. CT Angio: NONCONTRAST HEAD CT SCAN: No CT evidence of acute intracranial hemorrhage, mass effect or acute territorial infarct. Mild mucosal thickening in the anterior LEFT ethmoid sinuses and LEFT maxillary sinus.  MR Head 3/21/22 IMPRESSION: Acute infarct noted. Unremarkable MRA of the [place].

## 2022-03-23 ENCOUNTER — TRANSCRIPTION ENCOUNTER (OUTPATIENT)
Age: 72
End: 2022-03-23

## 2022-03-23 VITALS
OXYGEN SATURATION: 97 % | DIASTOLIC BLOOD PRESSURE: 69 MMHG | TEMPERATURE: 98 F | RESPIRATION RATE: 18 BRPM | SYSTOLIC BLOOD PRESSURE: 116 MMHG | HEART RATE: 75 BPM

## 2022-03-23 LAB
-  AMIKACIN: SIGNIFICANT CHANGE UP
-  AMOXICILLIN/CLAVULANIC ACID: SIGNIFICANT CHANGE UP
-  AMPICILLIN/SULBACTAM: SIGNIFICANT CHANGE UP
-  AMPICILLIN: SIGNIFICANT CHANGE UP
-  AZTREONAM: SIGNIFICANT CHANGE UP
-  CEFAZOLIN: SIGNIFICANT CHANGE UP
-  CEFEPIME: SIGNIFICANT CHANGE UP
-  CEFOXITIN: SIGNIFICANT CHANGE UP
-  CEFTRIAXONE: SIGNIFICANT CHANGE UP
-  CIPROFLOXACIN: SIGNIFICANT CHANGE UP
-  ERTAPENEM: SIGNIFICANT CHANGE UP
-  GENTAMICIN: SIGNIFICANT CHANGE UP
-  IMIPENEM: SIGNIFICANT CHANGE UP
-  LEVOFLOXACIN: SIGNIFICANT CHANGE UP
-  MEROPENEM: SIGNIFICANT CHANGE UP
-  NITROFURANTOIN: SIGNIFICANT CHANGE UP
-  PIPERACILLIN/TAZOBACTAM: SIGNIFICANT CHANGE UP
-  TIGECYCLINE: SIGNIFICANT CHANGE UP
-  TOBRAMYCIN: SIGNIFICANT CHANGE UP
-  TRIMETHOPRIM/SULFAMETHOXAZOLE: SIGNIFICANT CHANGE UP
ANION GAP SERPL CALC-SCNC: 14 MMOL/L — SIGNIFICANT CHANGE UP (ref 5–17)
ANION GAP SERPL CALC-SCNC: 15 MMOL/L — SIGNIFICANT CHANGE UP (ref 5–17)
APTT BLD: >200 SEC — CRITICAL HIGH (ref 27.5–35.5)
BUN SERPL-MCNC: 19 MG/DL — SIGNIFICANT CHANGE UP (ref 7–23)
BUN SERPL-MCNC: 20 MG/DL — SIGNIFICANT CHANGE UP (ref 7–23)
CALCIUM SERPL-MCNC: 8.3 MG/DL — LOW (ref 8.4–10.5)
CALCIUM SERPL-MCNC: 9.1 MG/DL — SIGNIFICANT CHANGE UP (ref 8.4–10.5)
CHLORIDE SERPL-SCNC: 104 MMOL/L — SIGNIFICANT CHANGE UP (ref 96–108)
CHLORIDE SERPL-SCNC: 96 MMOL/L — SIGNIFICANT CHANGE UP (ref 96–108)
CO2 SERPL-SCNC: 18 MMOL/L — LOW (ref 22–31)
CO2 SERPL-SCNC: 20 MMOL/L — LOW (ref 22–31)
CREAT SERPL-MCNC: 0.69 MG/DL — SIGNIFICANT CHANGE UP (ref 0.5–1.3)
CREAT SERPL-MCNC: 0.71 MG/DL — SIGNIFICANT CHANGE UP (ref 0.5–1.3)
CULTURE RESULTS: SIGNIFICANT CHANGE UP
EGFR: 100 ML/MIN/1.73M2 — SIGNIFICANT CHANGE UP
EGFR: 99 ML/MIN/1.73M2 — SIGNIFICANT CHANGE UP
GLUCOSE BLDC GLUCOMTR-MCNC: 115 MG/DL — HIGH (ref 70–99)
GLUCOSE BLDC GLUCOMTR-MCNC: 128 MG/DL — HIGH (ref 70–99)
GLUCOSE BLDC GLUCOMTR-MCNC: 134 MG/DL — HIGH (ref 70–99)
GLUCOSE BLDC GLUCOMTR-MCNC: 182 MG/DL — HIGH (ref 70–99)
GLUCOSE SERPL-MCNC: 196 MG/DL — HIGH (ref 70–99)
GLUCOSE SERPL-MCNC: 511 MG/DL — CRITICAL HIGH (ref 70–99)
HCT VFR BLD CALC: 42.1 % — SIGNIFICANT CHANGE UP (ref 39–50)
HGB BLD-MCNC: 13.5 G/DL — SIGNIFICANT CHANGE UP (ref 13–17)
MCHC RBC-ENTMCNC: 31.2 PG — SIGNIFICANT CHANGE UP (ref 27–34)
MCHC RBC-ENTMCNC: 32.1 GM/DL — SIGNIFICANT CHANGE UP (ref 32–36)
MCV RBC AUTO: 97.2 FL — SIGNIFICANT CHANGE UP (ref 80–100)
METHOD TYPE: SIGNIFICANT CHANGE UP
NRBC # BLD: 0 /100 WBCS — SIGNIFICANT CHANGE UP (ref 0–0)
ORGANISM # SPEC MICROSCOPIC CNT: SIGNIFICANT CHANGE UP
ORGANISM # SPEC MICROSCOPIC CNT: SIGNIFICANT CHANGE UP
PLATELET # BLD AUTO: 108 K/UL — LOW (ref 150–400)
POTASSIUM SERPL-MCNC: 3.6 MMOL/L — SIGNIFICANT CHANGE UP (ref 3.5–5.3)
POTASSIUM SERPL-MCNC: 4 MMOL/L — SIGNIFICANT CHANGE UP (ref 3.5–5.3)
POTASSIUM SERPL-SCNC: 3.6 MMOL/L — SIGNIFICANT CHANGE UP (ref 3.5–5.3)
POTASSIUM SERPL-SCNC: 4 MMOL/L — SIGNIFICANT CHANGE UP (ref 3.5–5.3)
RBC # BLD: 4.33 M/UL — SIGNIFICANT CHANGE UP (ref 4.2–5.8)
RBC # FLD: 14.5 % — SIGNIFICANT CHANGE UP (ref 10.3–14.5)
SODIUM SERPL-SCNC: 128 MMOL/L — LOW (ref 135–145)
SODIUM SERPL-SCNC: 139 MMOL/L — SIGNIFICANT CHANGE UP (ref 135–145)
SPECIMEN SOURCE: SIGNIFICANT CHANGE UP
WBC # BLD: 5.05 K/UL — SIGNIFICANT CHANGE UP (ref 3.8–10.5)
WBC # FLD AUTO: 5.05 K/UL — SIGNIFICANT CHANGE UP (ref 3.8–10.5)

## 2022-03-23 PROCEDURE — 82962 GLUCOSE BLOOD TEST: CPT

## 2022-03-23 PROCEDURE — 84132 ASSAY OF SERUM POTASSIUM: CPT

## 2022-03-23 PROCEDURE — 80053 COMPREHEN METABOLIC PANEL: CPT

## 2022-03-23 PROCEDURE — 84295 ASSAY OF SERUM SODIUM: CPT

## 2022-03-23 PROCEDURE — 87186 SC STD MICRODIL/AGAR DIL: CPT

## 2022-03-23 PROCEDURE — 83735 ASSAY OF MAGNESIUM: CPT

## 2022-03-23 PROCEDURE — 93880 EXTRACRANIAL BILAT STUDY: CPT

## 2022-03-23 PROCEDURE — 82565 ASSAY OF CREATININE: CPT

## 2022-03-23 PROCEDURE — 70551 MRI BRAIN STEM W/O DYE: CPT

## 2022-03-23 PROCEDURE — 83036 HEMOGLOBIN GLYCOSYLATED A1C: CPT

## 2022-03-23 PROCEDURE — 36415 COLL VENOUS BLD VENIPUNCTURE: CPT

## 2022-03-23 PROCEDURE — 82947 ASSAY GLUCOSE BLOOD QUANT: CPT

## 2022-03-23 PROCEDURE — 70498 CT ANGIOGRAPHY NECK: CPT | Mod: MA

## 2022-03-23 PROCEDURE — 85730 THROMBOPLASTIN TIME PARTIAL: CPT

## 2022-03-23 PROCEDURE — 71260 CT THORAX DX C+: CPT

## 2022-03-23 PROCEDURE — 84484 ASSAY OF TROPONIN QUANT: CPT

## 2022-03-23 PROCEDURE — 81001 URINALYSIS AUTO W/SCOPE: CPT

## 2022-03-23 PROCEDURE — 86803 HEPATITIS C AB TEST: CPT

## 2022-03-23 PROCEDURE — 82803 BLOOD GASES ANY COMBINATION: CPT

## 2022-03-23 PROCEDURE — 70544 MR ANGIOGRAPHY HEAD W/O DYE: CPT

## 2022-03-23 PROCEDURE — 87040 BLOOD CULTURE FOR BACTERIA: CPT

## 2022-03-23 PROCEDURE — 97162 PT EVAL MOD COMPLEX 30 MIN: CPT

## 2022-03-23 PROCEDURE — A9585: CPT

## 2022-03-23 PROCEDURE — 82330 ASSAY OF CALCIUM: CPT

## 2022-03-23 PROCEDURE — 85018 HEMOGLOBIN: CPT

## 2022-03-23 PROCEDURE — 85025 COMPLETE CBC W/AUTO DIFF WBC: CPT

## 2022-03-23 PROCEDURE — 74177 CT ABD & PELVIS W/CONTRAST: CPT

## 2022-03-23 PROCEDURE — 71045 X-RAY EXAM CHEST 1 VIEW: CPT

## 2022-03-23 PROCEDURE — 85014 HEMATOCRIT: CPT

## 2022-03-23 PROCEDURE — 85610 PROTHROMBIN TIME: CPT

## 2022-03-23 PROCEDURE — 70450 CT HEAD/BRAIN W/O DYE: CPT | Mod: MA

## 2022-03-23 PROCEDURE — 76872 US TRANSRECTAL: CPT

## 2022-03-23 PROCEDURE — 87635 SARS-COV-2 COVID-19 AMP PRB: CPT

## 2022-03-23 PROCEDURE — 80061 LIPID PANEL: CPT

## 2022-03-23 PROCEDURE — 82553 CREATINE MB FRACTION: CPT

## 2022-03-23 PROCEDURE — 80048 BASIC METABOLIC PNL TOTAL CA: CPT

## 2022-03-23 PROCEDURE — 93306 TTE W/DOPPLER COMPLETE: CPT

## 2022-03-23 PROCEDURE — 84443 ASSAY THYROID STIM HORMONE: CPT

## 2022-03-23 PROCEDURE — 87086 URINE CULTURE/COLONY COUNT: CPT

## 2022-03-23 PROCEDURE — 82435 ASSAY OF BLOOD CHLORIDE: CPT

## 2022-03-23 PROCEDURE — 82550 ASSAY OF CK (CPK): CPT

## 2022-03-23 PROCEDURE — 70496 CT ANGIOGRAPHY HEAD: CPT | Mod: MA

## 2022-03-23 PROCEDURE — 85027 COMPLETE CBC AUTOMATED: CPT

## 2022-03-23 PROCEDURE — 72158 MRI LUMBAR SPINE W/O & W/DYE: CPT

## 2022-03-23 PROCEDURE — 83605 ASSAY OF LACTIC ACID: CPT

## 2022-03-23 PROCEDURE — 99285 EMERGENCY DEPT VISIT HI MDM: CPT | Mod: 25

## 2022-03-23 PROCEDURE — 97165 OT EVAL LOW COMPLEX 30 MIN: CPT

## 2022-03-23 RX ORDER — APIXABAN 2.5 MG/1
1 TABLET, FILM COATED ORAL
Qty: 60 | Refills: 0
Start: 2022-03-23 | End: 2022-04-21

## 2022-03-23 RX ORDER — CEFUROXIME AXETIL 250 MG
1 TABLET ORAL
Qty: 20 | Refills: 0
Start: 2022-03-23 | End: 2022-04-01

## 2022-03-23 RX ORDER — METFORMIN HYDROCHLORIDE 850 MG/1
1 TABLET ORAL
Qty: 0 | Refills: 0 | DISCHARGE

## 2022-03-23 RX ORDER — RIVAROXABAN 15 MG-20MG
1 KIT ORAL
Qty: 0 | Refills: 0 | DISCHARGE
Start: 2022-03-23

## 2022-03-23 RX ORDER — METFORMIN HYDROCHLORIDE 850 MG/1
1 TABLET ORAL
Qty: 60 | Refills: 0
Start: 2022-03-23 | End: 2022-04-21

## 2022-03-23 RX ORDER — TAMSULOSIN HYDROCHLORIDE 0.4 MG/1
2 CAPSULE ORAL
Qty: 0 | Refills: 0 | DISCHARGE
Start: 2022-03-23

## 2022-03-23 RX ORDER — RAMIPRIL 5 MG
1 CAPSULE ORAL
Qty: 0 | Refills: 0 | DISCHARGE

## 2022-03-23 RX ORDER — TAMSULOSIN HYDROCHLORIDE 0.4 MG/1
2 CAPSULE ORAL
Qty: 60 | Refills: 0
Start: 2022-03-23 | End: 2022-04-21

## 2022-03-23 RX ORDER — RIVAROXABAN 15 MG-20MG
1 KIT ORAL
Qty: 30 | Refills: 0
Start: 2022-03-23 | End: 2022-04-21

## 2022-03-23 RX ORDER — MOXIFLOXACIN HYDROCHLORIDE TABLETS, 400 MG 400 MG/1
1 TABLET, FILM COATED ORAL
Qty: 0 | Refills: 0 | DISCHARGE

## 2022-03-23 RX ORDER — RIVAROXABAN 15 MG-20MG
20 KIT ORAL DAILY
Refills: 0 | Status: DISCONTINUED | OUTPATIENT
Start: 2022-03-23 | End: 2022-03-23

## 2022-03-23 RX ADMIN — Medication 81 MILLIGRAM(S): at 12:25

## 2022-03-23 RX ADMIN — CEFTRIAXONE 100 MILLIGRAM(S): 500 INJECTION, POWDER, FOR SOLUTION INTRAMUSCULAR; INTRAVENOUS at 05:54

## 2022-03-23 RX ADMIN — HEPARIN SODIUM 1400 UNIT(S)/HR: 5000 INJECTION INTRAVENOUS; SUBCUTANEOUS at 08:00

## 2022-03-23 RX ADMIN — HEPARIN SODIUM 0 UNIT(S)/HR: 5000 INJECTION INTRAVENOUS; SUBCUTANEOUS at 06:36

## 2022-03-23 RX ADMIN — Medication 1: at 08:37

## 2022-03-23 RX ADMIN — Medication 50 MILLIGRAM(S): at 05:54

## 2022-03-23 RX ADMIN — LISINOPRIL 10 MILLIGRAM(S): 2.5 TABLET ORAL at 05:54

## 2022-03-23 RX ADMIN — RIVAROXABAN 20 MILLIGRAM(S): KIT at 18:56

## 2022-03-23 NOTE — PROGRESS NOTE ADULT - NSPROGADDITIONALINFOA_GEN_ALL_CORE
- Counseling on diet, exercise, and medication adherence was done  - Counseling on smoking cessation and alcohol consumption offered when appropriate.  - Pain assessed and judicious use of narcotics when appropriate was discussed.    - Stroke education given when appropriate.  - Importance of fall prevention discussed.   - Differential diagnosis and plan of care discussed with patient and/or family and primary team
- Counseling on diet, exercise, and medication adherence was done  - Counseling on smoking cessation and alcohol consumption offered when appropriate.  - Pain assessed and judicious use of narcotics when appropriate was discussed.    - Stroke education given when appropriate.  - Importance of fall prevention discussed.   - Differential diagnosis and plan of care discussed with patient and/or family and primary team
discussed with patient in detail, expresses understanding of treatment plans.  discussed with ID attending
discussed with patient in detail, expresses understanding of treatment plans.
discussed with patient in detail, expresses understanding of treatment plans.  discussed with covering ACP   discussed with ID  patient provided my number for any questions

## 2022-03-23 NOTE — PROGRESS NOTE ADULT - REASON FOR ADMISSION
chest and right leg heaviness

## 2022-03-23 NOTE — PROGRESS NOTE ADULT - PROBLEM SELECTOR PLAN 8
HbA1C 6.0  continue finger sticks with short acting insulin sliding scale  finger sticks acceptable  resume metformin on discharge
HbA1C 6.0  continue finger sticks with short acting insulin sliding scale  finger sticks acceptable

## 2022-03-23 NOTE — DISCHARGE NOTE PROVIDER - NSDCCPCAREPLAN_GEN_ALL_CORE_FT
PRINCIPAL DISCHARGE DIAGNOSIS  Diagnosis: Chest pain  Assessment and Plan of Treatment:       SECONDARY DISCHARGE DIAGNOSES  Diagnosis: Acute CVA (cerebrovascular accident)  Assessment and Plan of Treatment:     Diagnosis: Abnormal sensation of leg  Assessment and Plan of Treatment:     Diagnosis: Diabetes mellitus  Assessment and Plan of Treatment:     Diagnosis: UTI (urinary tract infection)  Assessment and Plan of Treatment:     Diagnosis: HTN (hypertension)  Assessment and Plan of Treatment:      PRINCIPAL DISCHARGE DIAGNOSIS  Diagnosis: Chest pain  Assessment and Plan of Treatment: outpatient follow up with cardiology   HOME CARE INSTRUCTIONS  For the next few days, avoid physical activities that bring on chest pain. Continue physical activities as directed.  Do not smoke.  Avoid drinking alcohol.   Only take over-the-counter or prescription medicine for pain, discomfort, or fever as directed by your caregiver.  Follow your caregiver's suggestions for further testing if your chest pain does not go away.  Keep any follow-up appointments you made. If you do not go to an appointment, you could develop lasting (chronic) problems with pain. If there is any problem keeping an appointment, you must call to reschedule.   SEEK MEDICAL CARE IF:  You think you are having problems from the medicine you are taking. Read your medicine instructions carefully.  Your chest pain does not go away, even after treatment.  You develop a rash with blisters on your chest.  SEEK IMMEDIATE MEDICAL CARE IF:  You have increased chest pain or pain that spreads to your arm, neck, jaw, back, or abdomen.   You develop shortness of breath, an increasing cough, or you are coughing up blood.  You have severe back or abdominal pain, feel nauseous, or vomit.  You develop severe weakness, fainting, or chills.  You have a fever.  THIS IS AN EMERGENCY. Do not wait to see if the pain will go away. Get medical help at once. Call your local emergency services (_____________________). Do not drive yourself to the hospital.        SECONDARY DISCHARGE DIAGNOSES  Diagnosis: Acute CVA (cerebrovascular accident)  Assessment and Plan of Treatment: continue Aspirin   continue Apixaban   Follow up with Neurology outpatient      Diagnosis: Diabetes mellitus  Assessment and Plan of Treatment: continue Metformin   HgA1C this admission.= 6.0  Make sure you get your HgA1c checked every three months.  If you take oral diabetes medications, check your blood glucose two times a day.  If you take insulin, check your blood glucose before meals and at bedtime.  It's important not to skip any meals.  Keep a log of your blood glucose results and always take it with you to your doctor appointments.  Keep a list of your current medications including injectables and over the counter medications and bring this medication list with you to all your doctor appointments.  If you have not seen your opthalmologist this year call for appointment.  Check your feet daily for redness, sores, or openings. Do not self treat. If no improvement in two days call your primary care physician for an appointment.  Low blood sugar (hypoglycemia) is a blood sugar below 70mg/dl. Check your blood sugar if you feel signs/symptoms of hypoglycemia. If your blood sugar is below 70 take 15 grams of carbohydrates (ex 4 oz of apple juice, 3-4 glucosr tablets, or 4-6 oz of regular soda) wait 15 minutes and repeat blood sugar to make sure it comes up above 70.  If your blood sugar is above 70 and you are due for a meal, have a meal.  If you are not due for a meal have a snack.  This snack helps keeps your blood sugar at a safe range.      Diagnosis: UTI (urinary tract infection)  Assessment and Plan of Treatment: Prostatis   Continue Ceftin 500 mg po BID for 10 days    Diagnosis: HTN (hypertension)  Assessment and Plan of Treatment: Follow up with your medical doctor to establish long term blood pressure treatment goals.  continue Metformin   Continue Ramipril        PRINCIPAL DISCHARGE DIAGNOSIS  Diagnosis: Chest pain  Assessment and Plan of Treatment: outpatient follow up with cardiology   HOME CARE INSTRUCTIONS  For the next few days, avoid physical activities that bring on chest pain. Continue physical activities as directed.  Do not smoke.  Avoid drinking alcohol.   Only take over-the-counter or prescription medicine for pain, discomfort, or fever as directed by your caregiver.  Follow your caregiver's suggestions for further testing if your chest pain does not go away.  Keep any follow-up appointments you made. If you do not go to an appointment, you could develop lasting (chronic) problems with pain. If there is any problem keeping an appointment, you must call to reschedule.   SEEK MEDICAL CARE IF:  You think you are having problems from the medicine you are taking. Read your medicine instructions carefully.  Your chest pain does not go away, even after treatment.  You develop a rash with blisters on your chest.  SEEK IMMEDIATE MEDICAL CARE IF:  You have increased chest pain or pain that spreads to your arm, neck, jaw, back, or abdomen.   You develop shortness of breath, an increasing cough, or you are coughing up blood.  You have severe back or abdominal pain, feel nauseous, or vomit.  You develop severe weakness, fainting, or chills.  You have a fever.  THIS IS AN EMERGENCY. Do not wait to see if the pain will go away. Get medical help at once. Call your local emergency services ([phone]). Do not drive yourself to the hospital.        SECONDARY DISCHARGE DIAGNOSES  Diagnosis: Acute CVA (cerebrovascular accident)  Assessment and Plan of Treatment: continue Aspirin   continue Apixaban   Follow up with Neurology outpatient      Diagnosis: Diabetes mellitus  Assessment and Plan of Treatment: continue Metformin   HgA1C this admission.= 6.0  Make sure you get your HgA1c checked every three months.  If you take oral diabetes medications, check your blood glucose two times a day.  If you take insulin, check your blood glucose before meals and at bedtime.  It's important not to skip any meals.  Keep a log of your blood glucose results and always take it with you to your doctor appointments.  Keep a list of your current medications including injectables and over the counter medications and bring this medication list with you to all your doctor appointments.  If you have not seen your opthalmologist this year call for appointment.  Check your feet daily for redness, sores, or openings. Do not self treat. If no improvement in two days call your primary care physician for an appointment.  Low blood sugar (hypoglycemia) is a blood sugar below 70mg/dl. Check your blood sugar if you feel signs/symptoms of hypoglycemia. If your blood sugar is below 70 take 15 grams of carbohydrates (ex 4 oz of apple juice, 3-4 glucosr tablets, or 4-6 oz of regular soda) wait 15 minutes and repeat blood sugar to make sure it comes up above 70.  If your blood sugar is above 70 and you are due for a meal, have a meal.  If you are not due for a meal have a snack.  This snack helps keeps your blood sugar at a safe range.      Diagnosis: UTI (urinary tract infection)  Assessment and Plan of Treatment: Prostatitis   Continue Ceftin 500 mg po BID for 10 days  Outpatient follow up with urology    Diagnosis: HTN (hypertension)  Assessment and Plan of Treatment: Follow up with your medical doctor to establish long term blood pressure treatment goals.  continue Metformin   Continue Ramipril

## 2022-03-23 NOTE — PROVIDER CONTACT NOTE (CRITICAL VALUE NOTIFICATION) - BACKGROUND
admitted on 3/20 w/ c/p, currently in Afib, MRI + for acute infarct on heparin gtt.
Admitted 3/20 w/ C/P and UTI.

## 2022-03-23 NOTE — DISCHARGE NOTE PROVIDER - CARE PROVIDER_API CALL
Ezio Adam  CARDIAC ELECTROPHYSIOLOGY  [address]  [place] [address]  Phone: [phone]  Fax: [phone]  Follow Up Time: 1 week   Ezio Adam  CARDIAC ELECTROPHYSIOLOGY  [address]  [place] [address]  Phone: [phone]  Fax: [phone]  Follow Up Time: 1 week    Hardeep Salazar)  Urology  [address] [place] [address]  Phone: [phone]  Fax: [phone]  Follow Up Time:     Javier Joseph)  Neurology; Vascular Neurology  [address], [address]  [place] [address]  Phone: [phone]  Fax: [phone]  Follow Up Time:

## 2022-03-23 NOTE — PROGRESS NOTE ADULT - PROBLEM SELECTOR PLAN 5
continue home meds with hold parameters  acceptable for now
continue home meds with hold parameters  acceptable for now
continue statin  fasting lipid panel noted

## 2022-03-23 NOTE — PROGRESS NOTE ADULT - PROBLEM SELECTOR PLAN 7
HbA1C 6.0  continue finger sticks with short acting insulin sliding scale
supportive vare  left Charcot's foot  no active ulcers  continue ASA
supportive vare  left Charcot's foot  no active ulcers  continue ASA

## 2022-03-23 NOTE — PROGRESS NOTE ADULT - ASSESSMENT
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 presented to ED for mirage of health concerns including RLE heaviness/weakness, now resolved.   CTh with no acute findings. 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 prefer eliquis 5mg BID  - check blood cx  - ceftriaxone for UTI from retention. ID following   - ASA for stent   - lipitor 40mg daily; LDl goal <70   - MRI L with and w/o given known spine disease and RLE heaviness but better ordered  ; done f/u official read   - TTE wont change stroke management. needs ac regardless   - check blood cx   - telemetry   - urology following   - bowel regimen for constipation   - PT/OT/SS/SLP, OOBC  - check FS, glucose control <180  - GI/DVT ppx'  - Thank you for allowing me to participate in the care of this patient. Call with questions.   Jvaier Joseph MD  Vascular Neurology  Office: [phone] .    
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 presented to ED for mirage of health concerns including RLE heaviness/weakness, now resolved.   CTh with no acute findings. 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 can start heparin for now with plan to change to DOAC. prefer eliquis 5mg BID  - check blood cx  - ceftriaxone for UTI from retention. ID following   - ASA for stent   - lipitor 40mg daily; LDl goal <70   - MRI L with and w/o given known spine disease and RLE heaviness but better ordered   - TTE wont change stroke management. needs ac regardless   - check blood cx   - telemetry  \- urology following   - bowel regimen for constipation   - PT/OT/SS/SLP, OOBC  - check FS, glucose control <180  - GI/DVT ppx'  - Thank you for allowing me to participate in the care of this patient. Call with questions.   Javier Joseph MD  Vascular Neurology  Office: [phone] .    
71 year old admitted for right leg weakness and neuro workup related to the latter  Also states that he had a shaking chill and dysuria last last week and self treated himself with Cipro for a uti  He has a history of prostatitis yrs ago  . no further fever or chills Has pyuria but culture was done after ab given.  also relates to inability to urinate and was found to be in rentention    continue ceftriaxone for now   ct noted  no prostatic abscess   likley UTI due to retention   It does not seem that we will have an reliable urine culture as he started ab on his own    I think we can be discharged on po ceftin 500 mg bid to compete a 8 day course of therapy when otherwise ready with urology followup regarding the retention      
M with UTI, cystitis BPH    -Fu cultures  -Abx per primary team  -Post void residual to assess if in retention  -Hydration  -Monitor urine output Cr electrolytes  -Reviewed prostate US: no evidence of prostate abscess  -Outpatient  followup 
CARDIOLOGY ATTENDING    Patient seen and examined. Agree with above. Recommend lifelong a/c for AF with elevated chads-vasc, would f/u neuro regarding when it would be safe to change heparin to eliquis. Continue current medical therapy for known stable CAD, and would recommend NST in 6 weeks after he recovers from his acute stroke.
Patient seen and examined, agree with above assessment and plan as transcribed above.    - F/ U echo and bubble study  - given Non-anginal sxs and CVA holding off on ischemic eval    Reggie Schulz MD, [facility]  BEEPER [phone]  
71 year old male PMH HTN, HLD, A fib (no AC), CAD on ASA daily, prostatitis, presents to Emergency Department with right leg heaviness and vague discomfort in his chest admitted for further work up 

## 2022-03-23 NOTE — DISCHARGE NOTE PROVIDER - HOSPITAL COURSE
71 year old male PMH HTN, HLD, A fib (no AC), CAD on ASA daily, prostatitis, presents to Emergency Department with right leg heaviness and vague discomfort in his chest admitted for further work up      Problem/Plan - 1:  ·  Problem: Chest discomfort.   ·  Plan: cardiology help appreciated  resolved   outpatient follow up with cardiology.     Problem/Plan - 2:  ·  Problem: Acute CVA (cerebrovascular accident).   ·  Plan: MRI positive for acute non-hemorrhagic right occipital CVA  carotid duplex negative   now on anticoagulation likely for life  discharge  on rivaroxaban 20 po qd   apixaban not covered by insurance.     Problem/Plan - 3:  ·  Problem: Acute UTI.   ·  Plan: CT chest/abdomen/pelvis noted  no evidence malignancy  discussed with ID  discharge on Ceftin 500 BID x 10 more days  MRI L/S spine negative discitis or osteomyelitis.     Problem/Plan - 4:  ·  Problem: Urinary retention.   ·  Plan: likely cause of recurrent UTI  has residuals of 300 - 500 ml but still very reluctant to have in-dwelling [place]  continue Flomax 0.8 qhs  outpatient follow up with urology attending.     Problem/Plan - 5:  ·  Problem: HTN (hypertension).   ·  Plan: continue home meds with hold parameters  acceptable for now.     Problem/Plan - 6:  ·  Problem: HLD (hyperlipidemia).   ·  Plan: continue statin  fasting lipid panel noted.     Problem/Plan - 7:  ·  Problem: PVD (peripheral vascular disease).   ·  Plan: supportive vare  left Charcot's foot  no active ulcers  continue ASA.     Problem/Plan - 8:  ·  Problem: Diabetes mellitus.   ·  Plan: HbA1C 6.0  continue finger sticks with short acting insulin sliding scale  finger sticks acceptable  resume metformin on discharge.

## 2022-03-23 NOTE — PROGRESS NOTE ADULT - PROBLEM SELECTOR PLAN 2
MRI positive for acute non-hemorrhagic right occipital CVA  carotid duplex negative   now on anticoagulation likely for life  discharge  on rivaroxaban 20 po qd   apixaban not covered by insurance
CTA negative   carotid duplex negative   continue to follow neuro recommendations
MRI positive for acute non-hemorrhagic right occipital CVA  carotid duplex negative   now on anticoagulation   continue heparin until all invasive procedures completed

## 2022-03-23 NOTE — DISCHARGE NOTE PROVIDER - NSDCMRMEDTOKEN_GEN_ALL_CORE_FT
aspirin 81 mg oral delayed release tablet: 1 tab(s) orally once a day  atorvastatin 40 mg oral tablet: 1 tab(s) orally once a day  metFORMIN 500 mg oral tablet: 1 tab(s) orally 2 times a day    note: last fill jan #30 days  metoprolol succinate 50 mg oral tablet, extended release: 1 tab(s) orally once a day  ramipril 10 mg oral capsule: 1 cap(s) orally once a day  tamsulosin 0.4 mg oral capsule: 2 cap(s) orally once a day (at bedtime)   aspirin 81 mg oral delayed release tablet: 1 tab(s) orally once a day  atorvastatin 40 mg oral tablet: 1 tab(s) orally once a day  Eliquis 5 mg oral tablet: 1 tab(s) orally 2 times a day   metFORMIN 500 mg oral tablet: 1 tab(s) orally 2 times a day    note: last fill jan #30 days  metoprolol succinate 50 mg oral tablet, extended release: 1 tab(s) orally once a day  ramipril 10 mg oral capsule: 1 cap(s) orally once a day  tamsulosin 0.4 mg oral capsule: 2 cap(s) orally once a day (at bedtime)   aspirin 81 mg oral delayed release tablet: 1 tab(s) orally once a day  atorvastatin 40 mg oral tablet: 1 tab(s) orally once a day  cefuroxime 500 mg oral tablet: 1 tab(s) orally every 12 hours   metFORMIN 500 mg oral tablet: 1 tab(s) orally 2 times a day    note: last fill jan #30 days  metoprolol succinate 50 mg oral tablet, extended release: 1 tab(s) orally once a day  physical therapy : out patient for gait and strength training   ramipril 10 mg oral capsule: 1 cap(s) orally once a day  rivaroxaban 20 mg oral tablet: 1 tab(s) orally once a day  tamsulosin 0.4 mg oral capsule: 2 cap(s) orally once a day (at bedtime)   apixaban 5 mg oral tablet: 1 tab(s) orally 2 times a day   aspirin 81 mg oral delayed release tablet: 1 tab(s) orally once a day  atorvastatin 40 mg oral tablet: 1 tab(s) orally once a day  cefuroxime 500 mg oral tablet: 1 tab(s) orally every 12 hours   metFORMIN 500 mg oral tablet: 1 tab(s) orally 2 times a day    note: last fill jan #30 days  metoprolol succinate 50 mg oral tablet, extended release: 1 tab(s) orally once a day  physical therapy : once a day   ramipril 10 mg oral capsule: 1 cap(s) orally once a day  tamsulosin 0.4 mg oral capsule: 2 cap(s) orally once a day (at bedtime)   aspirin 81 mg oral delayed release tablet: 1 tab(s) orally once a day  atorvastatin 40 mg oral tablet: 1 tab(s) orally once a day  cefuroxime 500 mg oral tablet: 1 tab(s) orally every 12 hours   metFORMIN 500 mg oral tablet: 1 tab(s) orally 2 times a day    note: last fill jan #30 days  metoprolol succinate 50 mg oral tablet, extended release: 1 tab(s) orally once a day  physical therapy : once a day   ramipril 10 mg oral capsule: 1 cap(s) orally once a day  rivaroxaban 20 mg oral tablet: 1 tab(s) orally once a day  tamsulosin 0.4 mg oral capsule: 2 cap(s) orally once a day (at bedtime)

## 2022-03-23 NOTE — PROGRESS NOTE ADULT - PROBLEM SELECTOR PLAN 4
likely cause of recurrent UTI  has residuals of 300 - 500 ml but still very reluctant to have in-dwelling [place]  continue Flomax 0.8 qhs  outpatient follow up with urology attending
continue home meds with hold parameters  acceptable for now
likely cause of recurrent UTI  has residuals of 300 - 500 ml but very reluctant to have in-dwelling [place]  continue Flomax  discussed with urology attending  OK to avoid [place] per patient's request  advised to void as frequently as he can

## 2022-03-23 NOTE — DISCHARGE NOTE PROVIDER - PROVIDER TOKENS
PROVIDER:[TOKEN:[[id number]],FOLLOWUP:[1 week]] prior to admission you have wheelchair, rolling walker, commode @ home PROVIDER:[TOKEN:[[id number]],FOLLOWUP:[1 week]],PROVIDER:[TOKEN:[[id number]]],PROVIDER:[TOKEN:[[id number]]]

## 2022-03-23 NOTE — PROGRESS NOTE ADULT - PROBLEM SELECTOR PLAN 3
urine culture sent  blood cultures ordered secondary to h/o shaking chills at home  continue ceftriaxone IV  ID help appreciated  awaiting urine culture and blood cultures  prostate US pending  patient has urinary retention  urology evaluation noted  CT abd tomorrow
CT chest/abdomen/pelvis noted  no evidence malignancy  discussed with ID  discharge on Ceftin 500 BID x 10 more days  MRI L/S spine negative discitis or osteomyelitis
CT chest/abdomen/pelvis noted  no evidence malignancy  + cystitis and prostatitis  prostate US negative for abscess  continue ceftriaxone IV   urine culture + E Coli  sensitivity pending   continue to follow ID recommendations

## 2022-03-23 NOTE — DISCHARGE NOTE PROVIDER - NSDCFUADDAPPT_GEN_ALL_CORE_FT
APPTS ARE READY TO BE MADE: [ x] YES    Best Family or Patient Contact (if needed):    Additional Information about above appointments (if needed):    1:   2:   3:     Other comments or requests:    APPTS ARE READY TO BE MADE: [ x] YES    Best Family or Patient Contact (if needed):    Additional Information about above appointments (if needed):    1: Three attempts were made to reach patient which have been unsuccessful. Will await a call back from patient to confirm the patient made the appointments and no further assistance is needed	  2:   3:     Other comments or requests:

## 2022-03-23 NOTE — PROGRESS NOTE ADULT - PROBLEM SELECTOR PLAN 6
supportive vare  left Charcot's foot  no active ulcers  continue ASA
continue statin  fasting lipid panel noted
continue statin  fasting lipid panel noted

## 2022-03-23 NOTE — PROGRESS NOTE ADULT - SUBJECTIVE AND OBJECTIVE BOX
C A R D I O L O G Y  **********************************     DATE OF SERVICE: 03-22-22    Patient denies chest pain or shortness of breath.   Review of systems otherwise negative.  	  MEDICATIONS:  MEDICATIONS  (STANDING):  aspirin enteric coated 81 milliGRAM(s) Oral daily  atorvastatin 40 milliGRAM(s) Oral at bedtime  cefTRIAXone   IVPB 1000 milliGRAM(s) IV Intermittent every 24 hours  dextrose 40% Gel 15 Gram(s) Oral once  dextrose 5%. 1000 milliLiter(s) (50 mL/Hr) IV Continuous <Continuous>  dextrose 5%. 1000 milliLiter(s) (100 mL/Hr) IV Continuous <Continuous>  dextrose 50% Injectable 25 Gram(s) IV Push once  dextrose 50% Injectable 12.5 Gram(s) IV Push once  dextrose 50% Injectable 25 Gram(s) IV Push once  glucagon  Injectable 1 milliGRAM(s) IntraMuscular once  heparin  Infusion.  Unit(s)/Hr (17 mL/Hr) IV Continuous <Continuous>  influenza  Vaccine (HIGH DOSE) 0.7 milliLiter(s) IntraMuscular once  insulin lispro (ADMELOG) corrective regimen sliding scale   SubCutaneous three times a day before meals  insulin lispro (ADMELOG) corrective regimen sliding scale   SubCutaneous at bedtime  lisinopril 10 milliGRAM(s) Oral daily  metoprolol succinate ER 50 milliGRAM(s) Oral daily  tamsulosin 0.8 milliGRAM(s) Oral at bedtime      LABS:	 	    CARDIAC MARKERS:  CARDIAC MARKERS ( 20 Mar 2022 11:35 )  x     / x     / x     / x     / 8.3 ng/mL  CARDIAC MARKERS ( 20 Mar 2022 09:54 )  x     / x     / 207 U/L / x     / 8.4 ng/mL                                14.6   5.41  )-----------( 128      ( 22 Mar 2022 06:49 )             45.9     Hemoglobin: 14.6 g/dL (03-22 @ 06:49)  Hemoglobin: 14.3 g/dL (03-21 @ 07:11)  Hemoglobin: 14.6 g/dL (03-20 @ 09:54)      03-22    140  |  104  |  24<H>  ----------------------------<  106<H>  3.7   |  25  |  1.05    Ca    8.9      22 Mar 2022 06:49      Creatinine Trend: 1.05<--, 0.88<--, 0.93<--    COAGS:   PTT - ( 22 Mar 2022 09:20 )  PTT:92.8 sec    proBNP:   Lipid Profile:   HgA1c:   TSH:       PHYSICAL EXAM:  T(C): 36.4 (03-22-22 @ 11:02), Max: 36.8 (03-22-22 @ 05:15)  HR: 76 (03-22-22 @ 11:02) (64 - 78)  BP: 111/68 (03-22-22 @ 11:02) (111/68 - 118/70)  RR: 18 (03-22-22 @ 11:02) (18 - 18)  SpO2: 97% (03-22-22 @ 11:02) (96% - 97%)  Wt(kg): --  I&O's Summary    21 Mar 2022 07:01  -  22 Mar 2022 07:00  --------------------------------------------------------  IN: 1480 mL / OUT: 2495 mL / NET: -1015 mL    22 Mar 2022 07:01  -  22 Mar 2022 14:20  --------------------------------------------------------  IN: 600 mL / OUT: 1450 mL / NET: -850 mL          Gen: Appears well in NAD  HEENT:  (-)icterus (-)pallor  CV: N S1 S2 1/6 RAIN (+)2 Pulses B/l  Resp:  Clear to auscultation B/L, normal effort  GI: (+) BS Soft, NT, ND  Lymph:  (-)Edema, (-)obvious lymphadenopathy  Skin: Warm to touch, Normal turgor  Psych: Appropriate mood and affect      TELEMETRY: AF 50-80s	      ASSESSMENT/PLAN: Patient is a 70 y/o male with PMH HTN, HLD, chronic afib (recently stopped AC due to cost), CAD s/p FELA to mid LAD and OM1 (1/17/20), and prostatitis who presented with RLE heaviness and chest pressure. Cardiology consulted for further evaluation. MRI Brain with acute R occipital infarct.    - MI ruled out, no acute ischemic EKG changes  - Continue ASA/Statin/Toprol XL for known CAD with stents  - AC with hep gtt for AF in setting of acute CVA - cleared by neuro. Plan for DOAC when ready for discharge.  - Neuro f/u  - Abx per med  - Check TTE w/bubble study  - Pharm NST cancelled given acute CVA - should delay for at least 6 weeks. Patient with no recurrence of chest pressure and nonanginal in nature. No urgent ischemic eval needed at this time - awaiting echo results to confirm.  - Patient wishes to f/u in our office after discharge - will setup appt    Burak Morrison PA-C  Pager: [phone]      
Neurology Progress Note    S: Patient seen and examined. No new events overnight. patient denied CP, SOB, HA or pain. sitting in chair     Medication:  aspirin enteric coated 81 milliGRAM(s) Oral daily  atorvastatin 40 milliGRAM(s) Oral at bedtime  cefTRIAXone   IVPB 1000 milliGRAM(s) IV Intermittent every 24 hours  dextrose 40% Gel 15 Gram(s) Oral once  dextrose 5%. 1000 milliLiter(s) IV Continuous <Continuous>  dextrose 5%. 1000 milliLiter(s) IV Continuous <Continuous>  dextrose 50% Injectable 25 Gram(s) IV Push once  dextrose 50% Injectable 12.5 Gram(s) IV Push once  dextrose 50% Injectable 25 Gram(s) IV Push once  glucagon  Injectable 1 milliGRAM(s) IntraMuscular once  heparin  Infusion.  Unit(s)/Hr IV Continuous <Continuous>  influenza  Vaccine (HIGH DOSE) 0.7 milliLiter(s) IntraMuscular once  insulin lispro (ADMELOG) corrective regimen sliding scale   SubCutaneous three times a day before meals  insulin lispro (ADMELOG) corrective regimen sliding scale   SubCutaneous at bedtime  lisinopril 10 milliGRAM(s) Oral daily  metoprolol succinate ER 50 milliGRAM(s) Oral daily  tamsulosin 0.8 milliGRAM(s) Oral at bedtime      Vitals:  Vital Signs Last 24 Hrs  T(C): 36.8 (22 Mar 2022 05:15), Max: 36.8 (22 Mar 2022 05:15)  T(F): 98.3 (22 Mar 2022 05:15), Max: 98.3 (22 Mar 2022 05:15)  HR: 71 (22 Mar 2022 05:15) (68 - 78)  BP: 113/64 (22 Mar 2022 05:15) (105/67 - 118/70)  BP(mean): --  RR: 18 (22 Mar 2022 05:15) (18 - 18)  SpO2: 96% (22 Mar 2022 05:15) (96% - 96%)    General Exam:   General Appearance: Appropriately dressed and in no acute distress       Head: Normocephalic, atraumatic and no dysmorphic features  Ear, Nose, and Throat: Moist mucous membranes  CVS: S1S2+  Resp: No SOB, no wheeze or rhonchi  Abd: soft NTND  Extremities: No edema, no cyanosis  Skin: No bruises, no rashes     Neurological Exam:  Mental Status: Awake, alert and oriented x 3.  Able to follow simple and complex verbal commands. Able to name and repeat. fluent speech. No obvious aphasia or dysarthria noted.   Cranial Nerves: PERRL, EOMI, L lower quadrantanopia , sensation V1-V3 intact,  no obvious facial asymmetry , equal elevation of palate, scm/trap 5/5, tongue is midline on protrusion. no obvious papilledema on fundoscopic exam. Hearing is grossly intact.   Motor: Normal bulk, tone and strength throughout. Fine finger movements were intact and symmetric. no tremors or drift noted.    Sensation: Intact to light touch and pinprick throughout. no right/left confusion. no extinction to tactile on DSS. Romberg was negative.   Reflexes: 1+ throughout at biceps, brachioradialis, triceps, patellars and ankles bilaterally and equal. No clonus. R toe and L toe were both downgoing.  Coordination: No dysmetria on FNF or HKS  Gait: deferred     I personally reviewed the below data/images/labs:      CBC Full  -  ( 22 Mar 2022 06:49 )  WBC Count : 5.41 K/uL  RBC Count : 4.74 M/uL  Hemoglobin : 14.6 g/dL  Hematocrit : 45.9 %  Platelet Count - Automated : 128 K/uL  Mean Cell Volume : 96.8 fl  Mean Cell Hemoglobin : 30.8 pg  Mean Cell Hemoglobin Concentration : 31.8 gm/dL  Auto Neutrophil # : x  Auto Lymphocyte # : x  Auto Monocyte # : x  Auto Eosinophil # : x  Auto Basophil # : x  Auto Neutrophil % : x  Auto Lymphocyte % : x  Auto Monocyte % : x  Auto Eosinophil % : x  Auto Basophil % : x    -    140  |  104  |  24<H>  ----------------------------<  106<H>  3.7   |  25  |  1.05    Ca    8.9      22 Mar 2022 06:49  Mg     1.9     -20    TPro  7.1  /  Alb  4.0  /  TBili  1.2  /  DBili  x   /  AST  22  /  ALT  19  /  AlkPhos  47  -20    LIVER FUNCTIONS - ( 20 Mar 2022 09:54 )  Alb: 4.0 g/dL / Pro: 7.1 g/dL / ALK PHOS: 47 U/L / ALT: 19 U/L / AST: 22 U/L / GGT: x           PT/INR - ( 20 Mar 2022 09:54 )   PT: 16.0 sec;   INR: 1.39 ratio         PTT - ( 22 Mar 2022 02:04 )  PTT:71.7 sec  Urinalysis Basic - ( 20 Mar 2022 11:02 )    Color: Dark Yellow / Appearance: Slightly Turbid / S.010 / pH: x  Gluc: x / Ketone: Negative  / Bili: Negative / Urobili: 2 mg/dL   Blood: x / Protein: Trace / Nitrite: Positive   Leuk Esterase: Large / RBC: 3 /hpf /  /HPF   Sq Epi: x / Non Sq Epi: 0 /hpf / Bacteria: Negative    < from: MR Angio Head No Cont (22 @ 16:31) >    ACC: [id number] EXAM:  MR ANGIO BRAIN                        ACC: [id number] EXAM:  MR BRAIN                          PROCEDURE DATE:  2022          INTERPRETATION:  Clinical indications: Right lower extremity heaviness.    MRI of brain was performed using sagittal T1 axial 3-D SPGR, T2 T2 FLAIR   diffusion and gradient echo sequence.    MRA of the [place] was performed using 3-D [place]   technique    Parenchymal volume loss and chronic microvascular ischemic changes are   identified.    There is evidence of abnormal T2 prolongation with restricted diffusion   seen involving the lateral aspect of the right occipital cortex. This is   compatible with an acute infarct. No hemorrhagic transformation is seen.   No significant shift or herniation is seen.    Partial empty sella is seen.    The large vessels demonstrate normal.    Left maxillary sinus mucosal thickening is seen    Both distal internal carotid, anterior cerebral, middle cerebral, basilar   and posterior cerebral arteries appear normal without evidence of   aneurysm or significant stenosis.    IMPRESSION: Acute infarct as described above    Unremarkable MRA of the [place].    --- End of Report ---            PHYLLIS JIMENEZ MD; Attending Radiologist  This document has been electronically signed. Mar 21 2022  4:42PM    < end of copied text >    
infectious diseases progress note:    Patient is a 71y old  Male who presents with a chief complaint of chest and right leg heaviness (22 Mar 2022 08:52)        Chest pain             Allergies    No Known Allergies    Intolerances        ANTIBIOTICS/RELEVANT:  antimicrobials  cefTRIAXone   IVPB 1000 milliGRAM(s) IV Intermittent every 24 hours    immunologic:  influenza  Vaccine (HIGH DOSE) 0.7 milliLiter(s) IntraMuscular once    OTHER:  aspirin enteric coated 81 milliGRAM(s) Oral daily  atorvastatin 40 milliGRAM(s) Oral at bedtime  dextrose 40% Gel 15 Gram(s) Oral once  dextrose 5%. 1000 milliLiter(s) IV Continuous <Continuous>  dextrose 5%. 1000 milliLiter(s) IV Continuous <Continuous>  dextrose 50% Injectable 25 Gram(s) IV Push once  dextrose 50% Injectable 12.5 Gram(s) IV Push once  dextrose 50% Injectable 25 Gram(s) IV Push once  glucagon  Injectable 1 milliGRAM(s) IntraMuscular once  heparin  Infusion.  Unit(s)/Hr IV Continuous <Continuous>  insulin lispro (ADMELOG) corrective regimen sliding scale   SubCutaneous three times a day before meals  insulin lispro (ADMELOG) corrective regimen sliding scale   SubCutaneous at bedtime  lisinopril 10 milliGRAM(s) Oral daily  metoprolol succinate ER 50 milliGRAM(s) Oral daily  tamsulosin 0.8 milliGRAM(s) Oral at bedtime      Objective:  Vital Signs Last 24 Hrs  T(C): 36.8 (22 Mar 2022 05:15), Max: 36.8 (22 Mar 2022 05:15)  T(F): 98.3 (22 Mar 2022 05:15), Max: 98.3 (22 Mar 2022 05:15)  HR: 71 (22 Mar 2022 05:15) (68 - 78)  BP: 113/64 (22 Mar 2022 05:15) (105/67 - 118/70)  BP(mean): --  RR: 18 (22 Mar 2022 05:15) (18 - 18)  SpO2: 96% (22 Mar 2022 05:15) (96% - 96%)     s  Eyes:HARESH, EOMI  Ear/Nose/Throat: no oral lesion, no sinus tenderness on percussion	  Neck:no JVD, no lymphadenopathy, supple  Respiratory: CTA ruslan  Cardiovascular: S1S2 RRR, no murmurs  Gastrointestinal:soft, (+) BS, no HSM  Extremities:no e/e/c        LABS:                        14.6   5.41  )-----------( 128      ( 22 Mar 2022 06:49 )             45.9     -    140  |  104  |  24<H>  ----------------------------<  106<H>  3.7   |  25  |  1.05    Ca    8.9      22 Mar 2022 06:49  Mg     1.9         TPro  7.1  /  Alb  4.0  /  TBili  1.2  /  DBili  x   /  AST  22  /  ALT  19  /  AlkPhos  47  -20    PT/INR - ( 20 Mar 2022 09:54 )   PT: 16.0 sec;   INR: 1.39 ratio         PTT - ( 22 Mar 2022 02:04 )  PTT:71.7 sec  Urinalysis Basic - ( 20 Mar 2022 11:02 )    Color: Dark Yellow / Appearance: Slightly Turbid / S.010 / pH: x  Gluc: x / Ketone: Negative  / Bili: Negative / Urobili: 2 mg/dL   Blood: x / Protein: Trace / Nitrite: Positive   Leuk Esterase: Large / RBC: 3 /hpf /  /HPF   Sq Epi: x / Non Sq Epi: 0 /hpf / Bacteria: Negative          MICROBIOLOGY:    RECENT CULTURES:   @ 02:09 .Blood Blood-Peripheral                No growth to date.          RESPIRATORY CULTURES:              RADIOLOGY & ADDITIONAL STUDIES:        Pager [phone]  After 5 pm/weekends or if no response :[phone]
Urology Progress Note    Overnight Events:  No acute urologic events overnight.  He reports he is voiding on his own  Feels better  Amublating to bathroom      Review of Systems:   Constitutional: No weight loss, no weakness  CV: No chest pain, no chest pressure  Pulm: No shortness of breath, cough, or sputum    Physical Exam:  Vital signs  T(C): 36.6 (03-23-22 @ 04:55), Max: 36.6 (03-23-22 @ 04:55)  HR: 70 (03-23-22 @ 04:55)  BP: 117/75 (03-23-22 @ 04:55)  SpO2: 95% (03-23-22 @ 04:55)  Wt(kg): --    Gen: No acute distress. Normal mood  Abd: soft, non-tender, non-distended  : Non-palpable bladder    Labs:      03-23 @ 06:35    WBC --    / Hct --    / SCr 0.71     03-23 @ 05:13    WBC 5.05  / Hct 42.1  / SCr 0.69     03-23    139  |  104  |  20  ----------------------------<  196<H>  4.0   |  20<L>  |  0.71    Ca    9.1      23 Mar 2022 06:35      PTT - ( 23 Mar 2022 05:13 )  PTT:>200.0 sec    PRostate US:     IMPRESSION:    Prostatomegaly. No prostatic abscess.    CT ap:  IMPRESSION:  No evidence of suspicious mass or lymphadenopathy in the chest, abdomen,   or pelvis.  Mild clustered nodular opacities in both lungs, most consistent with   small airways disease.  Patulous bladder, which may reflect chronic changes of bladder outlet   obstruction. Enlarged prostate.
C A R D I O L O G Y  **********************************     DATE OF SERVICE: 03-23-22    Denies chest pain or shortness of breath.   Review of systems otherwise negative.  	    MEDICATIONS  (STANDING):  aspirin enteric coated 81 milliGRAM(s) Oral daily  atorvastatin 40 milliGRAM(s) Oral at bedtime  cefTRIAXone   IVPB 1000 milliGRAM(s) IV Intermittent every 24 hours  dextrose 40% Gel 15 Gram(s) Oral once  dextrose 5%. 1000 milliLiter(s) (50 mL/Hr) IV Continuous <Continuous>  dextrose 5%. 1000 milliLiter(s) (100 mL/Hr) IV Continuous <Continuous>  dextrose 50% Injectable 25 Gram(s) IV Push once  dextrose 50% Injectable 12.5 Gram(s) IV Push once  dextrose 50% Injectable 25 Gram(s) IV Push once  glucagon  Injectable 1 milliGRAM(s) IntraMuscular once  heparin  Infusion.  Unit(s)/Hr (17 mL/Hr) IV Continuous <Continuous>  influenza  Vaccine (HIGH DOSE) 0.7 milliLiter(s) IntraMuscular once  insulin lispro (ADMELOG) corrective regimen sliding scale   SubCutaneous three times a day before meals  insulin lispro (ADMELOG) corrective regimen sliding scale   SubCutaneous at bedtime  lisinopril 10 milliGRAM(s) Oral daily  metoprolol succinate ER 50 milliGRAM(s) Oral daily  tamsulosin 0.8 milliGRAM(s) Oral at bedtime    MEDICATIONS  (PRN):      LABS:                          13.5   5.05  )-----------( 108      ( 23 Mar 2022 05:13 )             42.1     Hemoglobin: 13.5 g/dL (03-23 @ 05:13)  Hemoglobin: 14.6 g/dL (03-22 @ 06:49)  Hemoglobin: 14.3 g/dL (03-21 @ 07:11)  Hemoglobin: 14.6 g/dL (03-20 @ 09:54)    03-23    139  |  104  |  20  ----------------------------<  196<H>  4.0   |  20<L>  |  0.71    Ca    9.1      23 Mar 2022 06:35      Creatinine Trend: 0.71<--, 0.69<--, 1.05<--, 0.88<--, 0.93<--   PTT - ( 23 Mar 2022 05:13 )  PTT:>200.0 sec  CARDIAC MARKERS ( 20 Mar 2022 11:35 )  x     / x     / x     / x     / 8.3 ng/mL          03-22-22 @ 07:01  -  03-23-22 @ 07:00  --------------------------------------------------------  IN: 1096 mL / OUT: 2450 mL / NET: -1354 mL    03-23-22 @ 07:01  -  03-23-22 @ 11:19  --------------------------------------------------------  IN: 360 mL / OUT: 1200 mL / NET: -840 mL        PHYSICAL EXAM  Vital Signs Last 24 Hrs  T(C): 36.7 (23 Mar 2022 10:41), Max: 36.7 (23 Mar 2022 10:41)  T(F): 98.1 (23 Mar 2022 10:41), Max: 98.1 (23 Mar 2022 10:41)  HR: 75 (23 Mar 2022 10:41) (70 - 77)  BP: 116/69 (23 Mar 2022 10:41) (115/71 - 117/75)  BP(mean): --  RR: 18 (23 Mar 2022 10:41) (18 - 18)  SpO2: 97% (23 Mar 2022 10:41) (95% - 97%)        Gen: Appears well in NAD  HEENT:  (-)icterus (-)pallor  CV: N S1 S2 1/6 RAIN (+)2 Pulses B/l  Resp:  Clear to auscultation B/L, normal effort  GI: (+) BS Soft, NT, ND  Lymph:  (-)Edema, (-)obvious lymphadenopathy  Skin: Warm to touch, Normal turgor  Psych: Appropriate mood and affect      TELEMETRY: AF 70-80s    < from: TTE with Doppler (w/Cont) (03.22.22 @ 11:30) >  Conclusions:  1. Calcified trileaflet aortic valve with decreased  opening. Peak transaortic valve gradient equals 8 mm Hg,  mean transaortic valve gradient equals 5 mm Hg, aortic  valve velocity time integral equals 29 cm. Moderate aortic  regurgitation.  2. Severely dilated left atrium.  LA volume index = 72  cc/m2.  3. Endocardium not well visualized; grossly normal left  ventricular systolic function.  4. Right ventricular enlargement with normal right  ventricular systolic function.  5. Agitated saline injection demonstrates no evidence of a  patent foramen ovale.    < end of copied text >      ASSESSMENT/PLAN: Patient is a 70 y/o male with PMH HTN, HLD, chronic afib (recently stopped AC due to cost), CAD s/p FELA to mid LAD and OM1 (1/17/20), and prostatitis who presented with RLE heaviness and chest pressure. Cardiology consulted for further evaluation. MRI Brain with acute R occipital infarct.    - MI ruled out, no acute ischemic EKG changes  - Continue ASA/Statin/Toprol XL for known CAD with stents  - AC with hep gtt for AF in setting of acute CVA - cleared by neuro. Plan for DOAC when ready for discharge.  - Neuro f/u  - Abx per med  - TTE with normal LV function, mod AI, no PFO  - Pharm NST cancelled given acute CVA - should delay for at least 6 weeks. Patient with no recurrence of chest pressure and nonanginal in nature. No urgent ischemic eval needed at this time.  - No further inpatient cardiac w/u planned  - Patient wishes to f/u in our office after discharge - will setup appt    Burak Morrison PA-C  Pager: [phone]      
Uro Brief Note    -Pt not in room for evaluation/assessment  -Person at bedside stated went off floor to get a scan  -Urology will leave formal note/consultation on rounds tomorrow AM  -Fu  ultrasound  -Fu cultures  -Abx per ID  -Post void residual to assess if pt is in retention  -Differential includes UTI vs prostatitis        Alexey Bejarano MD  Urology  [facility]
Neurology Progress Note    S: Patient seen and examined. No new events overnight. patient denied CP, SOB, HA or pain. sitting in chair ; had MR L spine f/u read     Medication:  MEDICATIONS  (STANDING):  aspirin enteric coated 81 milliGRAM(s) Oral daily  atorvastatin 40 milliGRAM(s) Oral at bedtime  cefTRIAXone   IVPB 1000 milliGRAM(s) IV Intermittent every 24 hours  dextrose 40% Gel 15 Gram(s) Oral once  dextrose 5%. 1000 milliLiter(s) (50 mL/Hr) IV Continuous <Continuous>  dextrose 5%. 1000 milliLiter(s) (100 mL/Hr) IV Continuous <Continuous>  dextrose 50% Injectable 25 Gram(s) IV Push once  dextrose 50% Injectable 12.5 Gram(s) IV Push once  dextrose 50% Injectable 25 Gram(s) IV Push once  glucagon  Injectable 1 milliGRAM(s) IntraMuscular once  heparin  Infusion.  Unit(s)/Hr (17 mL/Hr) IV Continuous <Continuous>  influenza  Vaccine (HIGH DOSE) 0.7 milliLiter(s) IntraMuscular once  insulin lispro (ADMELOG) corrective regimen sliding scale   SubCutaneous three times a day before meals  insulin lispro (ADMELOG) corrective regimen sliding scale   SubCutaneous at bedtime  lisinopril 10 milliGRAM(s) Oral daily  metoprolol succinate ER 50 milliGRAM(s) Oral daily  tamsulosin 0.8 milliGRAM(s) Oral at bedtime    MEDICATIONS  (PRN):        Vitals:    Vital Signs Last 24 Hrs  T(C): 36.6 (22 @ 04:55), Max: 36.6 (22 @ 04:55)  T(F): 97.9 (22 @ 04:55), Max: 97.9 (22 @ 04:55)  HR: 70 (22 @ 04:55) (70 - 77)  BP: 117/75 (22 @ 04:55) (111/68 - 117/75)  BP(mean): --  RR: 18 (22 @ 04:55) (18 - 18)  SpO2: 95% (22 @ 04:55) (95% - 97%)        General Exam:   General Appearance: Appropriately dressed and in no acute distress       Head: Normocephalic, atraumatic and no dysmorphic features  Ear, Nose, and Throat: Moist mucous membranes  CVS: S1S2+  Resp: No SOB, no wheeze or rhonchi  Abd: soft NTND  Extremities: No edema, no cyanosis  Skin: No bruises, no rashes     Neurological Exam:  Mental Status: Awake, alert and oriented x 3.  Able to follow simple and complex verbal commands. Able to name and repeat. fluent speech. No obvious aphasia or dysarthria noted.   Cranial Nerves: PERRL, EOMI, L lower quadrantanopia , sensation V1-V3 intact,  no obvious facial asymmetry , equal elevation of palate, scm/trap 5/5, tongue is midline on protrusion. no obvious papilledema on fundoscopic exam. Hearing is grossly intact.   Motor: Normal bulk, tone and strength throughout. Fine finger movements were intact and symmetric. no tremors or drift noted.    Sensation: Intact to light touch and pinprick throughout. no right/left confusion. no extinction to tactile on DSS. Romberg was negative.   Reflexes: 1+ throughout at biceps, brachioradialis, triceps, patellars and ankles bilaterally and equal. No clonus. R toe and L toe were both downgoing.  Coordination: No dysmetria on FNF or HKS  Gait: deferred     I personally reviewed the below data/images/labs:      CBC Full  -  ( 23 Mar 2022 05:13 )  WBC Count : 5.05 K/uL  RBC Count : 4.33 M/uL  Hemoglobin : 13.5 g/dL  Hematocrit : 42.1 %  Platelet Count - Automated : 108 K/uL  Mean Cell Volume : 97.2 fl  Mean Cell Hemoglobin : 31.2 pg  Mean Cell Hemoglobin Concentration : 32.1 gm/dL  Auto Neutrophil # : x  Auto Lymphocyte # : x  Auto Monocyte # : x  Auto Eosinophil # : x  Auto Basophil # : x  Auto Neutrophil % : x  Auto Lymphocyte % : x  Auto Monocyte % : x  Auto Eosinophil % : x  Auto Basophil % : x      -    139  |  104  |  20  ----------------------------<  196<H>  4.0   |  20<L>  |  0.71    Ca    9.1      23 Mar 2022 06:35        LIVER FUNCTIONS - ( 20 Mar 2022 09:54 )  Alb: 4.0 g/dL / Pro: 7.1 g/dL / ALK PHOS: 47 U/L / ALT: 19 U/L / AST: 22 U/L / GGT: x           PT/INR - ( 20 Mar 2022 09:54 )   PT: 16.0 sec;   INR: 1.39 ratio         PTT - ( 22 Mar 2022 02:04 )  PTT:71.7 sec  Urinalysis Basic - ( 20 Mar 2022 11:02 )    Color: Dark Yellow / Appearance: Slightly Turbid / S.010 / pH: x  Gluc: x / Ketone: Negative  / Bili: Negative / Urobili: 2 mg/dL   Blood: x / Protein: Trace / Nitrite: Positive   Leuk Esterase: Large / RBC: 3 /hpf /  /HPF   Sq Epi: x / Non Sq Epi: 0 /hpf / Bacteria: Negative    < from: MR Angio Head No Cont (22 @ 16:31) >    ACC: [id number] EXAM:  MR ANGIO BRAIN                        ACC: [id number] EXAM:  MR BRAIN                          PROCEDURE DATE:  2022          INTERPRETATION:  Clinical indications: Right lower extremity heaviness.    MRI of brain was performed using sagittal T1 axial 3-D SPGR, T2 T2 FLAIR   diffusion and gradient echo sequence.    MRA of the [place] was performed using 3-D [place]   technique    Parenchymal volume loss and chronic microvascular ischemic changes are   identified.    There is evidence of abnormal T2 prolongation with restricted diffusion   seen involving the lateral aspect of the right occipital cortex. This is   compatible with an acute infarct. No hemorrhagic transformation is seen.   No significant shift or herniation is seen.    Partial empty sella is seen.    The large vessels demonstrate normal.    Left maxillary sinus mucosal thickening is seen    Both distal internal carotid, anterior cerebral, middle cerebral, basilar   and posterior cerebral arteries appear normal without evidence of   aneurysm or significant stenosis.    IMPRESSION: Acute infarct as described above    Unremarkable MRA of the [place].    --- End of Report ---       
Patient is a 71y old  Male who presents with a chief complaint of chest and right leg heaviness (21 Mar 2022 14:30)      DATE OF SERVICE: 22 @ 16:16    SUBJECTIVE / OVERNIGHT EVENTS: overnight events noted    ROS:  Resp: No cough no sputum production  CVS: No chest pain no palpitations no orthopnea  GI: no N/V/D  : improved dysuria, no hematuria  Neuro: no weakness no paresthesias  Heme: No petechiae no easy bruising  Msk: No joint pain no swelling  Skin: No rash no itching        MEDICATIONS  (STANDING):  aspirin enteric coated 81 milliGRAM(s) Oral daily  atorvastatin 40 milliGRAM(s) Oral at bedtime  cefTRIAXone   IVPB 1000 milliGRAM(s) IV Intermittent every 24 hours  dextrose 40% Gel 15 Gram(s) Oral once  dextrose 5%. 1000 milliLiter(s) (50 mL/Hr) IV Continuous <Continuous>  dextrose 5%. 1000 milliLiter(s) (100 mL/Hr) IV Continuous <Continuous>  dextrose 50% Injectable 25 Gram(s) IV Push once  dextrose 50% Injectable 12.5 Gram(s) IV Push once  dextrose 50% Injectable 25 Gram(s) IV Push once  glucagon  Injectable 1 milliGRAM(s) IntraMuscular once  heparin   Injectable 5000 Unit(s) SubCutaneous every 8 hours  influenza  Vaccine (HIGH DOSE) 0.7 milliLiter(s) IntraMuscular once  insulin lispro (ADMELOG) corrective regimen sliding scale   SubCutaneous three times a day before meals  insulin lispro (ADMELOG) corrective regimen sliding scale   SubCutaneous at bedtime  lisinopril 10 milliGRAM(s) Oral daily  metoprolol succinate ER 50 milliGRAM(s) Oral daily    MEDICATIONS  (PRN):        CAPILLARY BLOOD GLUCOSE      POCT Blood Glucose.: 117 mg/dL (21 Mar 2022 11:39)  POCT Blood Glucose.: 123 mg/dL (21 Mar 2022 08:06)  POCT Blood Glucose.: 106 mg/dL (20 Mar 2022 21:07)  POCT Blood Glucose.: 96 mg/dL (20 Mar 2022 18:01)    I&O's Summary    20 Mar 2022 07:01  -  21 Mar 2022 07:00  --------------------------------------------------------  IN: 100 mL / OUT: 250 mL / NET: -150 mL    21 Mar 2022 07:01  -  21 Mar 2022 16:16  --------------------------------------------------------  IN: 600 mL / OUT: 1070 mL / NET: -470 mL        Vital Signs Last 24 Hrs  T(C): 36.4 (21 Mar 2022 11:28), Max: 36.9 (20 Mar 2022 20:51)  T(F): 97.5 (21 Mar 2022 11:28), Max: 98.4 (20 Mar 2022 20:51)  HR: 68 (21 Mar 2022 11:28) (68 - 84)  BP: 105/67 (21 Mar 2022 11:28) (105/67 - 114/71)  BP(mean): --  RR: 18 (21 Mar 2022 11:28) (16 - 18)  SpO2: 96% (21 Mar 2022 11:28) (94% - 99%)    PHYSICAL EXAM:  Neck: Supple, no JVD  Lungs: no wheeze, no crackles  CVS: S1 S2 systolic murmur +   Abdomen: no tenderness  Neuro: AO x 3 nonfocal   gait subtle dragging right LE  Ext: chronic hyperpigmentation bilateral   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3 ng/mL  CARDIAC MARKERS ( 20 Mar 2022 09:54 )  x     / x     / 207 U/L / x     / 8.4 ng/mL      Urinalysis Basic - ( 20 Mar 2022 11:02 )    Color: Dark Yellow / Appearance: Slightly Turbid / S.010 / pH: x  Gluc: x / Ketone: Negative  / Bili: Negative / Urobili: 2 mg/dL   Blood: x / Protein: Trace / Nitrite: Positive   Leuk Esterase: Large / RBC: 3 /hpf /  /HPF   Sq Epi: x / Non Sq Epi: 0 /hpf / Bacteria: Negative          All consultant(s) notes reviewed and care discussed with other providers        Contact Number, Dr Garcia [phone]
Patient is a 71y old  Male who presents with a chief complaint of chest and right leg heaviness (23 Mar 2022 11:58)      DATE OF SERVICE: 03-23-22 @ 14:29    SUBJECTIVE / OVERNIGHT EVENTS: overnight events noted    ROS:  Resp: No cough no sputum production  CVS: No chest pain no palpitations no orthopnea  GI: no N/V/D  : no dysuria, no hematuria  Neuro: no weakness no paresthesias  Heme: No petechiae no easy bruising  Msk: No joint pain no swelling  Skin: No rash no itching        MEDICATIONS  (STANDING):  aspirin enteric coated 81 milliGRAM(s) Oral daily  atorvastatin 40 milliGRAM(s) Oral at bedtime  cefTRIAXone   IVPB 1000 milliGRAM(s) IV Intermittent every 24 hours  dextrose 40% Gel 15 Gram(s) Oral once  dextrose 5%. 1000 milliLiter(s) (50 mL/Hr) IV Continuous <Continuous>  dextrose 5%. 1000 milliLiter(s) (100 mL/Hr) IV Continuous <Continuous>  dextrose 50% Injectable 25 Gram(s) IV Push once  dextrose 50% Injectable 12.5 Gram(s) IV Push once  dextrose 50% Injectable 25 Gram(s) IV Push once  glucagon  Injectable 1 milliGRAM(s) IntraMuscular once  heparin  Infusion.  Unit(s)/Hr (17 mL/Hr) IV Continuous <Continuous>  influenza  Vaccine (HIGH DOSE) 0.7 milliLiter(s) IntraMuscular once  insulin lispro (ADMELOG) corrective regimen sliding scale   SubCutaneous three times a day before meals  insulin lispro (ADMELOG) corrective regimen sliding scale   SubCutaneous at bedtime  lisinopril 10 milliGRAM(s) Oral daily  metoprolol succinate ER 50 milliGRAM(s) Oral daily  tamsulosin 0.8 milliGRAM(s) Oral at bedtime    MEDICATIONS  (PRN):        CAPILLARY BLOOD GLUCOSE      POCT Blood Glucose.: 134 mg/dL (23 Mar 2022 11:46)  POCT Blood Glucose.: 115 mg/dL (23 Mar 2022 08:33)  POCT Blood Glucose.: 182 mg/dL (23 Mar 2022 06:04)  POCT Blood Glucose.: 120 mg/dL (22 Mar 2022 21:46)  POCT Blood Glucose.: 130 mg/dL (22 Mar 2022 17:37)    I&O's Summary    22 Mar 2022 07:01  -  23 Mar 2022 07:00  --------------------------------------------------------  IN: 1096 mL / OUT: 2450 mL / NET: -1354 mL    23 Mar 2022 07:01  -  23 Mar 2022 14:29  --------------------------------------------------------  IN: 720 mL / OUT: 2700 mL / NET: -1980 mL        Vital Signs Last 24 Hrs  T(C): 36.7 (23 Mar 2022 10:41), Max: 36.7 (23 Mar 2022 10:41)  T(F): 98.1 (23 Mar 2022 10:41), Max: 98.1 (23 Mar 2022 10:41)  HR: 75 (23 Mar 2022 10:41) (70 - 77)  BP: 116/69 (23 Mar 2022 10:41) (115/71 - 117/75)  BP(mean): --  RR: 18 (23 Mar 2022 10:41) (18 - 18)  SpO2: 97% (23 Mar 2022 10:41) (95% - 97%)      PHYSICAL EXAM:  Neck: Supple, no JVD  Lungs: clear  CVS: S1 S2 systolic murmur +   Abdomen: no tenderness  Neuro: AO x 3 nonfocal   gait normal   Ext: chronic hyperpigmentation bilateral   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
Patient is a 71y old  Male who presents with a chief complaint of chest and right leg heaviness (22 Mar 2022 14:20)      DATE OF SERVICE: 03-22-22 @ 17:06    SUBJECTIVE / OVERNIGHT EVENTS: overnight events noted    ROS:  Resp: No cough no sputum production  CVS: No chest pain no palpitations no orthopnea  GI: no N/V/D  : no dysuria, no hematuria  Neuro: no weakness no paresthesias  Heme: No petechiae no easy bruising  Msk: No joint pain no swelling  Skin: chronic anterior chest right sided rash no itching        MEDICATIONS  (STANDING):  aspirin enteric coated 81 milliGRAM(s) Oral daily  atorvastatin 40 milliGRAM(s) Oral at bedtime  cefTRIAXone   IVPB 1000 milliGRAM(s) IV Intermittent every 24 hours  dextrose 40% Gel 15 Gram(s) Oral once  dextrose 5%. 1000 milliLiter(s) (50 mL/Hr) IV Continuous <Continuous>  dextrose 5%. 1000 milliLiter(s) (100 mL/Hr) IV Continuous <Continuous>  dextrose 50% Injectable 25 Gram(s) IV Push once  dextrose 50% Injectable 12.5 Gram(s) IV Push once  dextrose 50% Injectable 25 Gram(s) IV Push once  glucagon  Injectable 1 milliGRAM(s) IntraMuscular once  heparin  Infusion.  Unit(s)/Hr (17 mL/Hr) IV Continuous <Continuous>  influenza  Vaccine (HIGH DOSE) 0.7 milliLiter(s) IntraMuscular once  insulin lispro (ADMELOG) corrective regimen sliding scale   SubCutaneous three times a day before meals  insulin lispro (ADMELOG) corrective regimen sliding scale   SubCutaneous at bedtime  lisinopril 10 milliGRAM(s) Oral daily  metoprolol succinate ER 50 milliGRAM(s) Oral daily  tamsulosin 0.8 milliGRAM(s) Oral at bedtime    MEDICATIONS  (PRN):        CAPILLARY BLOOD GLUCOSE      POCT Blood Glucose.: 104 mg/dL (22 Mar 2022 13:19)  POCT Blood Glucose.: 125 mg/dL (22 Mar 2022 07:57)  POCT Blood Glucose.: 119 mg/dL (21 Mar 2022 21:07)  POCT Blood Glucose.: 116 mg/dL (21 Mar 2022 17:12)    I&O's Summary    21 Mar 2022 07:01  -  22 Mar 2022 07:00  --------------------------------------------------------  IN: 1480 mL / OUT: 2495 mL / NET: -1015 mL    22 Mar 2022 07:01  -  22 Mar 2022 17:06  --------------------------------------------------------  IN: 600 mL / OUT: 1450 mL / NET: -850 mL        Vital Signs Last 24 Hrs  T(C): 36.4 (22 Mar 2022 11:02), Max: 36.8 (22 Mar 2022 05:15)  T(F): 97.5 (22 Mar 2022 11:02), Max: 98.3 (22 Mar 2022 05:15)  HR: 76 (22 Mar 2022 11:02) (64 - 78)  BP: 111/68 (22 Mar 2022 11:02) (111/68 - 118/70)  BP(mean): --  RR: 18 (22 Mar 2022 11:02) (18 - 18)  SpO2: 97% (22 Mar 2022 11:02) (96% - 97%)    PHYSICAL EXAM:  Neck: Supple, no JVD  Lungs: no wheeze, no crackles  CVS: S1 S2 systolic murmur +   Abdomen: no tenderness  Neuro: AO x 3 nonfocal   gait normal now   Ext: chronic hyperpigmentation bilateral   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

## 2022-03-23 NOTE — DISCHARGE NOTE PROVIDER - NSDCFUSCHEDAPPT_GEN_ALL_CORE_FT
QUINCY MELLO ; 03/30/2022 ; NPP Denise Roman [address] QUINCY MELLO ; 03/30/2022 ; [facility]  QUINCY MELLO ; 03/30/2022 ; NPP Ophtlam Roman [address]  QUINCY MELLO ; 03/31/2022 ; NPP Ophthal [address]  QUINCY MELLO ; 04/06/2022 ; NPP Ophthal [address]

## 2022-03-23 NOTE — PROGRESS NOTE ADULT - PROBLEM SELECTOR PLAN 1
cardiology help appreciated  resolved   stress thallium and echocardiogram
cardiology help appreciated  resolved   outpatient follow up with cardiology
cardiology help appreciated  resolved   stress thallium canceled secondary to acute pontine CVA  echocardiogram pending

## 2022-03-23 NOTE — PROGRESS NOTE ADULT - PROVIDER SPECIALTY LIST ADULT
Urology
Cardiology
Neurology
Urology
Cardiology
Infectious Disease
Neurology
Internal Medicine

## 2022-03-25 NOTE — CHART NOTE - NSCHARTNOTEFT_GEN_A_CORE
Left  message for patient in regards to follow up care with callback information.
Left a message(s) for patient in regards to follow up care with callback information.
Hyperglycemia     03-23    128<L>  |  96  |  19  ----------------------------<  511<HH>  3.6   |  18<L>  |  0.69    Ca    8.3<L>      23 Mar 2022 05:13    POC blood glucose was was 182    Activated Partial Thromboplastin Time (03.23.22 @ 05:13)   Activated Partial Thromboplastin Time: >200.0: The recommended therapeutic heparin range (full dose) is 58-99 seconds.     No signs of bleeding    possible dilution with heparin gtt when morning labs drawn  Plan  Repeat BMP  heparin drip on hold normogram followed    Endorsed to covering NP attending to follow    Cammy crane NP  Spectralink [id number]
MR Angio head shows Acute infarct on the lateral aspect of the right occipital cortex.   Per Dr. Garcia started patient on Heparin drip Full AC nomogram without heparin bolus (pending poss procedures) with plans to transition to [facility] on discharge.  Discussed with patient including risks and pt consents to Heparin drip    [id number]

## 2022-03-26 LAB
CULTURE RESULTS: SIGNIFICANT CHANGE UP
CULTURE RESULTS: SIGNIFICANT CHANGE UP
SPECIMEN SOURCE: SIGNIFICANT CHANGE UP
SPECIMEN SOURCE: SIGNIFICANT CHANGE UP

## 2022-03-30 ENCOUNTER — APPOINTMENT (OUTPATIENT)
Dept: OPHTHALMOLOGY | Facility: AMBULATORY SURGERY CENTER | Age: 72
End: 2022-03-30

## 2022-03-31 ENCOUNTER — APPOINTMENT (OUTPATIENT)
Dept: OPHTHALMOLOGY | Facility: CLINIC | Age: 72
End: 2022-03-31

## 2022-04-06 ENCOUNTER — APPOINTMENT (OUTPATIENT)
Dept: OPHTHALMOLOGY | Facility: CLINIC | Age: 72
End: 2022-04-06

## 2022-04-15 ENCOUNTER — TRANSCRIPTION ENCOUNTER (OUTPATIENT)
Age: 72
End: 2022-04-15

## 2022-04-15 ENCOUNTER — APPOINTMENT (OUTPATIENT)
Dept: OPHTHALMOLOGY | Facility: CLINIC | Age: 72
End: 2022-04-15
Payer: MEDICARE

## 2022-04-15 ENCOUNTER — NON-APPOINTMENT (OUTPATIENT)
Age: 72
End: 2022-04-15

## 2022-04-15 PROCEDURE — 92012 INTRM OPH EXAM EST PATIENT: CPT

## 2022-05-13 ENCOUNTER — APPOINTMENT (OUTPATIENT)
Dept: UROLOGY | Facility: CLINIC | Age: 72
End: 2022-05-13
Payer: MEDICARE

## 2022-05-13 VITALS
DIASTOLIC BLOOD PRESSURE: 77 MMHG | TEMPERATURE: 97.7 F | OXYGEN SATURATION: 99 % | SYSTOLIC BLOOD PRESSURE: 126 MMHG | HEART RATE: 61 BPM

## 2022-05-13 PROCEDURE — 51798 US URINE CAPACITY MEASURE: CPT

## 2022-05-13 PROCEDURE — 99204 OFFICE O/P NEW MOD 45 MIN: CPT

## 2022-05-13 RX ORDER — FINASTERIDE 5 MG/1
5 TABLET, FILM COATED ORAL DAILY
Qty: 90 | Refills: 3 | Status: ACTIVE | COMMUNITY
Start: 2022-05-13 | End: 1900-01-01

## 2022-05-13 NOTE — HISTORY OF PRESENT ILLNESS
[FreeTextEntry1] : 71M s/p CAD + 2 stent 2019. DM (A1c was up to 12% at one point), +stroke\par 3 weeks ago had lower extremity stent\par long standing history of LUTS\par admitted 4 weeks ago to [place] with retention, prostatis, Cystitis. minor stroke per report\par now on tamsulosin 0.4 bid\par reports ?PVR 600cc\par no voiding complaints\par was advised to go home with joya - pulled catheter at that time\par MRI prostate 48 gram prostate PRADS 2\par severe distention and bladder wall trabeculation\par normal creat\par no hydro

## 2022-05-13 NOTE — ASSESSMENT
[FreeTextEntry1] : BPH with urinary retention\par PVR 746cc r/o retention\par UA UCX\par hx DM and stroke\par start finasteride\par continue tamsulosin 0.8\par urodynamics\par refusing joya\par cysto after UDS

## 2022-05-16 LAB
APPEARANCE: CLEAR
BACTERIA: NEGATIVE
BILIRUBIN URINE: NEGATIVE
BLOOD URINE: NEGATIVE
COLOR: YELLOW
GLUCOSE QUALITATIVE U: ABNORMAL
HYALINE CASTS: 0 /LPF
KETONES URINE: NORMAL
LEUKOCYTE ESTERASE URINE: NEGATIVE
MICROSCOPIC-UA: NORMAL
NITRITE URINE: NEGATIVE
PH URINE: 7
PROTEIN URINE: NEGATIVE
RED BLOOD CELLS URINE: 2 /HPF
SPECIFIC GRAVITY URINE: 1.02
SQUAMOUS EPITHELIAL CELLS: 0 /HPF
UROBILINOGEN URINE: NORMAL
WHITE BLOOD CELLS URINE: 0 /HPF

## 2022-05-17 ENCOUNTER — NON-APPOINTMENT (OUTPATIENT)
Age: 72
End: 2022-05-17

## 2022-05-17 LAB — BACTERIA UR CULT: ABNORMAL

## 2022-05-19 ENCOUNTER — NON-APPOINTMENT (OUTPATIENT)
Age: 72
End: 2022-05-19

## 2022-05-24 ENCOUNTER — APPOINTMENT (OUTPATIENT)
Dept: UROLOGY | Facility: CLINIC | Age: 72
End: 2022-05-24
Payer: MEDICARE

## 2022-05-24 VITALS — DIASTOLIC BLOOD PRESSURE: 69 MMHG | TEMPERATURE: 97.8 F | HEART RATE: 78 BPM | SYSTOLIC BLOOD PRESSURE: 121 MMHG

## 2022-05-24 PROCEDURE — 52000 CYSTOURETHROSCOPY: CPT

## 2022-05-25 ENCOUNTER — EMERGENCY (EMERGENCY)
Facility: HOSPITAL | Age: 72
LOS: 1 days | Discharge: HOME | End: 2022-05-25
Attending: EMERGENCY MEDICINE | Admitting: EMERGENCY MEDICINE
Payer: MEDICARE

## 2022-05-25 VITALS
HEIGHT: 68 IN | OXYGEN SATURATION: 99 % | TEMPERATURE: 98 F | HEART RATE: 61 BPM | SYSTOLIC BLOOD PRESSURE: 111 MMHG | WEIGHT: 190.04 LBS | RESPIRATION RATE: 18 BRPM | DIASTOLIC BLOOD PRESSURE: 68 MMHG

## 2022-05-25 VITALS
OXYGEN SATURATION: 99 % | TEMPERATURE: 98 F | RESPIRATION RATE: 17 BRPM | SYSTOLIC BLOOD PRESSURE: 115 MMHG | HEART RATE: 66 BPM | DIASTOLIC BLOOD PRESSURE: 64 MMHG

## 2022-05-25 DIAGNOSIS — R33.9 RETENTION OF URINE, UNSPECIFIED: ICD-10-CM

## 2022-05-25 DIAGNOSIS — Z87.440 PERSONAL HISTORY OF URINARY (TRACT) INFECTIONS: ICD-10-CM

## 2022-05-25 DIAGNOSIS — I25.10 ATHEROSCLEROTIC HEART DISEASE OF NATIVE CORONARY ARTERY WITHOUT ANGINA PECTORIS: ICD-10-CM

## 2022-05-25 DIAGNOSIS — Z79.84 LONG TERM (CURRENT) USE OF ORAL HYPOGLYCEMIC DRUGS: ICD-10-CM

## 2022-05-25 DIAGNOSIS — I10 ESSENTIAL (PRIMARY) HYPERTENSION: ICD-10-CM

## 2022-05-25 DIAGNOSIS — Z95.820 PERIPHERAL VASCULAR ANGIOPLASTY STATUS WITH IMPLANTS AND GRAFTS: ICD-10-CM

## 2022-05-25 DIAGNOSIS — E11.51 TYPE 2 DIABETES MELLITUS WITH DIABETIC PERIPHERAL ANGIOPATHY WITHOUT GANGRENE: ICD-10-CM

## 2022-05-25 DIAGNOSIS — Z79.82 LONG TERM (CURRENT) USE OF ASPIRIN: ICD-10-CM

## 2022-05-25 DIAGNOSIS — Z79.01 LONG TERM (CURRENT) USE OF ANTICOAGULANTS: ICD-10-CM

## 2022-05-25 DIAGNOSIS — Z95.5 PRESENCE OF CORONARY ANGIOPLASTY IMPLANT AND GRAFT: ICD-10-CM

## 2022-05-25 DIAGNOSIS — E78.5 HYPERLIPIDEMIA, UNSPECIFIED: ICD-10-CM

## 2022-05-25 DIAGNOSIS — Z87.438 PERSONAL HISTORY OF OTHER DISEASES OF MALE GENITAL ORGANS: ICD-10-CM

## 2022-05-25 LAB
APPEARANCE UR: ABNORMAL
APPEARANCE: CLEAR
BACTERIA # UR AUTO: PRESENT /HPF
BACTERIA: NEGATIVE
BILIRUB UR-MCNC: NEGATIVE — SIGNIFICANT CHANGE UP
BILIRUBIN URINE: NEGATIVE
BLOOD URINE: NORMAL
COLOR SPEC: ABNORMAL
COLOR: YELLOW
DIFF PNL FLD: ABNORMAL
EPI CELLS # UR: SIGNIFICANT CHANGE UP /HPF (ref 0–5)
GLUCOSE QUALITATIVE U: ABNORMAL
GLUCOSE UR QL: >=1000
HYALINE CASTS # UR AUTO: SIGNIFICANT CHANGE UP /LPF (ref 0–2)
HYALINE CASTS: 0 /LPF
KETONES UR-MCNC: NEGATIVE — SIGNIFICANT CHANGE UP
KETONES URINE: NORMAL
LEUKOCYTE ESTERASE UR-ACNC: NEGATIVE — SIGNIFICANT CHANGE UP
LEUKOCYTE ESTERASE URINE: NEGATIVE
MICROSCOPIC-UA: NORMAL
NITRITE UR-MCNC: POSITIVE
NITRITE URINE: NEGATIVE
PH UR: 7 — SIGNIFICANT CHANGE UP (ref 5–8)
PH URINE: 7.5
PROT UR-MCNC: 30 MG/DL
PROTEIN URINE: NEGATIVE
RBC CASTS # UR COMP ASSIST: > 10 /HPF
RED BLOOD CELLS URINE: 5 /HPF
SP GR SPEC: 1.02 — SIGNIFICANT CHANGE UP (ref 1–1.03)
SPECIFIC GRAVITY URINE: 1.02
SQUAMOUS EPITHELIAL CELLS: 3 /HPF
UROBILINOGEN FLD QL: 2 E.U./DL
UROBILINOGEN URINE: NORMAL
WBC UR QL: ABNORMAL /HPF
WHITE BLOOD CELLS URINE: 1 /HPF

## 2022-05-25 PROCEDURE — 81001 URINALYSIS AUTO W/SCOPE: CPT

## 2022-05-25 PROCEDURE — 99284 EMERGENCY DEPT VISIT MOD MDM: CPT | Mod: 25

## 2022-05-25 PROCEDURE — 51702 INSERT TEMP BLADDER CATH: CPT

## 2022-05-25 PROCEDURE — 51798 US URINE CAPACITY MEASURE: CPT

## 2022-05-25 PROCEDURE — 99284 EMERGENCY DEPT VISIT MOD MDM: CPT

## 2022-05-25 NOTE — ED PROVIDER NOTE - NSFOLLOWUPINSTRUCTIONS_ED_ALL_ED_FT
If you can not urinate in 6 hours, please present back to ED or Dr. Kulkarni's office.    Continue taking your antibiotics and your other medication as prescribed.        Acute Urinary Retention, Male       Acute urinary retention is a condition in which a person is unable to pass urine or can only pass a little urine. This condition can happen suddenly and last for a short time. If left untreated, it can become long-term (chronic) and result in kidney damage or other serious complications.      What are the causes?    This condition may be caused by:  •Obstruction or narrowing of the tube that drains the bladder (urethra). This may be caused by surgery, problems with nearby organs, or injury to the bladder or urethra.      •Problems with the nerves in the bladder.      •Tumors in the area of the pelvis, bladder, or urethra.      •Certain medicines.      •Bladder or urinary tract infection.      •Constipation.        What increases the risk?    This condition is more likely to develop in older men. As men age, their prostate may become larger and may start to press or squeeze on the bladder or the urethra. Other chronic health conditions can increase the risk of acute urinary retention. These include:  •Diseases such as multiple sclerosis.      •Spinal cord injuries.      •Diabetes.      •Degenerative cognitive conditions, such as delirium or dementia.      •Psychological conditions. A man may hold his urine due to trauma or because he does not want to use the bathroom.        What are the signs or symptoms?    Symptoms of this condition include:  •Trouble urinating.      •Pain in the lower abdomen.        How is this diagnosed?    This condition is diagnosed based on a physical exam and your medical history. You may also have other tests, including:  •An ultrasound of the bladder or kidneys or both.      •Blood tests.      •A urine analysis.      •Additional tests may be needed, such as a CT scan, MRI, and kidney or bladder function tests.        How is this treated?    Treatment for this condition may include:  •Medicines.      •Placing a thin, sterile tube (catheter) into the bladder to drain urine out of the body. This is called an indwelling urinary catheter. After it is inserted, the catheter is held in place with a small balloon that is filled with sterile water. Urine drains from the catheter into a collection bag outside of the body.      •Behavioral therapy.      •Treatment for other conditions.      If needed, you may be treated in the hospital for kidney function problems or to manage other complications.      Follow these instructions at home:    Medicines     •Take over-the-counter and prescription medicines only as told by your health care provider. Avoid certain medicines, such as decongestants, antihistamines, and some prescription medicines. Do not take any medicine unless your health care provider approves.      •If you were prescribed an antibiotic medicine, take it as told by your health care provider. Do not stop using the antibiotic even if you start to feel better.      General instructions     • Do not use any products that contain nicotine or tobacco. These products include cigarettes, chewing tobacco, and vaping devices, such as e-cigarettes. If you need help quitting, ask your health care provider.      •Drink enough fluid to keep your urine pale yellow.      •If you have an indwelling urinary catheter, follow the instructions from your health care provider.      •Monitor any changes in your symptoms. Tell your health care provider about any changes.      •If instructed, monitor your blood pressure at home. Report changes as told by your health care provider.      •Keep all follow-up visits. This is important.        Contact a health care provider if:    •You have uncomfortable bladder contractions that you cannot control (spasms).      •You leak urine with the spasms.        Get help right away if:    •You have chills or a fever.      •You have blood in your urine.    •You have a catheter and the following happens:  •Your catheter stops draining urine.      •Your catheter falls out.          Summary    •Acute urinary retention is a condition in which a person is unable to pass urine or can only pass a little urine. If left untreated, this condition can result in kidney damage or other serious complications.      •An enlarged prostate may cause this condition. As men age, their prostate gland may become larger and may press or squeeze on the bladder or the urethra.      •Treatment for this condition may include medicines and placement of an indwelling urinary catheter.      •Monitor any changes in your symptoms. Tell your health care provider about any changes.      This information is not intended to replace advice given to you by your health care provider. Make sure you discuss any questions you have with your health care provider.      Document Revised: 09/08/2021 Document Reviewed: 09/08/2021    [employer] Patient Education © 2022 [employer]

## 2022-05-25 NOTE — ED ADULT NURSE REASSESSMENT NOTE - NS ED NURSE REASSESS COMMENT FT1
patient straight cathed per his request, declined joya placement at this time and would like to try voiding trial. Straight cathed perform aseptically, 1000ml dark yellow urine obtained. Sample sent to the lab as ordered. Patient tolerated procedure well. No s/s of distress noted. Safety precautions maintained.

## 2022-05-25 NOTE — ED PROVIDER NOTE - ATTESTATION, MLM
Spine appears normal, range of motion is not limited. LLE: (+) significant swelling, ecchymosis and tenderness to medial aspect of left lower leg just distal to the knee. Swelling extending anteriorly, does not extend circumferentially. Able to range at ankle, knee. Achilles intact. No laxity. Good pedal pulses. Normal sensation and strength distally. I have reviewed and confirmed nurses' notes for patient's medications, allergies, medical history, and surgical history.

## 2022-05-25 NOTE — ED PROVIDER NOTE - OBJECTIVE STATEMENT
70 y/o m with PMH of HTN, CAD s/p 2 stents, PAD s/p 1 stent on xarelto, DM, HLD presents to ED in urinary retention.  As per pt, he was admitted to [facility] three weeks prior for prostatitis and UTI and had urinary retention for only one day.  Today pt was seen by Dr. Kulkarni in office for evaluation of BPH and had cystoscopy showing most likely obstruction.  Pt had joya placed but then when he got to his Ob/GYN office - he couldn't take pain of joya anymore and removed it.  Pt is an Ob/GYN physician.  However, now patient has not been able to urinate.  Denies fever, chills.

## 2022-05-25 NOTE — ED PROVIDER NOTE - PATIENT PORTAL LINK FT
You can access the FollowMyHealth Patient Portal offered by [facility] by registering at the following website: [email or web address]. By joining [employer]’s FollowMyHealth portal, you will also be able to view your health information using other applications (apps) compatible with our system.

## 2022-05-25 NOTE — ED PROVIDER NOTE - CARE PROVIDER_API CALL
Easton Kulkarni  Urology  [address], [facility] [place] [address]  Phone: [phone]  Fax: [phone]  Follow Up Time:

## 2022-05-25 NOTE — ED ADULT NURSE NOTE - OBJECTIVE STATEMENT
Patient is awake, alert, orientedx3, presenting to ed with supra public pain, stating "I have not urinated much today." Patient denies penile discharge or burning, denies hematuria or fever/chills. Patient denies nausea/vomiting. Sitting calmly/states pain is mild. Patient denies hx of needing a catheter, hx htn/DM

## 2022-05-26 ENCOUNTER — NON-APPOINTMENT (OUTPATIENT)
Age: 72
End: 2022-05-26

## 2022-05-26 LAB — BACTERIA UR CULT: NORMAL

## 2022-05-27 ENCOUNTER — APPOINTMENT (OUTPATIENT)
Dept: UROLOGY | Facility: CLINIC | Age: 72
End: 2022-05-27
Payer: MEDICARE

## 2022-05-27 DIAGNOSIS — K40.90 UNILATERAL INGUINAL HERNIA, W/OUT OBSTRUCTION OR GANGRENE, NOT SPECIFIED AS RECURRENT: ICD-10-CM

## 2022-05-27 DIAGNOSIS — E27.8 OTHER SPECIFIED DISORDERS OF ADRENAL GLAND: ICD-10-CM

## 2022-05-27 PROCEDURE — 99214 OFFICE O/P EST MOD 30 MIN: CPT

## 2022-05-27 PROCEDURE — 51798 US URINE CAPACITY MEASURE: CPT

## 2022-05-27 PROCEDURE — 51741 ELECTRO-UROFLOWMETRY FIRST: CPT

## 2022-05-27 NOTE — HISTORY OF PRESENT ILLNESS
[FreeTextEntry1] : 71 year old male \par recently hospitalized at [facility]\par mild CVA and urinary retention\par uncontrolled diabetes at that time 12 no 5.7\par recommended catheterization but refused \par started on Flomax 0.8 mg\par had cystoscopy  several days ago\par after cystoscopy had catheter inserted and then removed and went into urinary retention and had straght catheter and has now voiding\par \par MRI  - bladder distention; prostate volume 48; no hydro; bladder wall trabeculation\par \par Cystoscopy - 5 cm; candidate for urolift\par \par

## 2022-05-27 NOTE — ASSESSMENT
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  He was first found to have a large postvoid residual at the time of hospitalization at [facility] when he experienced a mild cerebrovascular accident with no sequela.  At that time catheterization was recommended he however refused.  He was recently evaluated by Dr. Lilian Marrero who performed office cystoscopy on the patient.  Subsequently he went into urinary retention.  He was unable to tolerate a Seymour catheter.  He had straight catheterization and has subsequently been voiding well.  He denies any urinary symptoms.  His flow rate today was 10.7 cc/s with a voided volume of 217 cc.  His postvoid residual was 395 cc.  He currently is on Flomax.\par \par \par I discussed with BASIL MELLO the possible indications for treatment of prostatic obstruction including:\par 1 urinary symptoms and quality of life issues\par 2.  Obstruction and urinary retention\par 3.  Infections\par 4.  Bladder stones\par 5.  Upper tract changes including hydronephrosis and renal dysfunction\par \par We discussed medical management with alpha blocking agents (alfuzosin, tamsulosin doxazosin etc.)\par We discussed the addition of finasteride to his regimen\par We briefly discussed PROSTATE ARTERY EMBOLIZATION and HOLUP procedures.\par He has expressed his inability to have a Seymour catheter due to discomfort.  He is unwilling to have any procedure that results in catheterization.\par \par In light of his diabetes which have been poorly controlled we discussed proceeding with urodynamic evaluation to assess his bladder outlet obstruction versus neurogenic bladder.  We discussed the fact that even in the face of bladder dysfunction relief of bladder outlet would be in general the appropriate course of action.\par \par Previous MRI demonstrated multiple adrenal nodules.  These have not been followed up.  We discussed proceeding with a CT with contrast to evaluate these.  He understands the need for this.  We discussed the current shortage of contrast material and will defer this for the time being.\par \par We discussed finding of hernia.  He is aware based upon prior MRI.  He is asymtomatic\par Plan:\par 1.  Renal ultrasound rule out hydronephrosis\par 2.  UDS with Dr. Valdez re  bladder outlet obstruction versus neurogenic bladder\par 3. continue flomax\par 4. followup re management\par \par The BASIL MELLO  expressed fully understanding of the information provided, the consequences and the management.\par \par \par alpha blockers and finasteride\par \par surgical intervention\par \par \par

## 2022-05-27 NOTE — PHYSICAL EXAM
[Abdomen Soft] : soft [Abdomen Tenderness] : non-tender [] : no hepato-splenomegaly [Abdomen Mass (___ Cm)] : no abdominal mass palpated [Urethral Meatus] : meatus normal [Penis Abnormality] : normal circumcised penis [Epididymis] : the epididymides were normal [Testes Tenderness] : no tenderness of the testes [Testes Mass (___cm)] : there were no testicular masses [Prostate Size ___ (0-4)] : prostate size [unfilled] (scale: 0-4) [FreeTextEntry1] : left inguinal hernia

## 2022-05-29 NOTE — OPERATIVE REPORT - OPERATIVE RPOSRT DETAILS
DATE OF SURGERY: June 1, 2022    Surgeon:  Kristan Martin    PRE-OP DIAGNOSIS: Cataract Left Eye    POST-OP DIAGNOSIS: Same    ANESTHESIA: MAC    PROCEDURE: Cataract extraction with intraocular lens implant left eye    SPECIMEN/TISSUE REMOVED: None    ESTIMATED BLOOD LOSS: < 1mL    COMPLICATIONS: None    The patient was brought to the operating room.  A drop of topical anesthetic was placed in the eye. The patient was blocked using a peribulbar injection of Lidocaine 2%, Marcaine 0.75 %, and hylenex equivalent.  The patient was prepped and draped in the usual sterile fashion, including Betadine drops in the eye. A lid speculum was placed between the lids of the left eye. A paracentesis was made inferior. Intracameral preservative free lidocaine was instilled and the anterior chamber was filled with air, trypan blue, and viscoelastic.. A clear cornea temporal incision was created using a 2.4mm keratome. A continuous curvilinear capsulorhexis was started with a cystotome and completed with capsulorhexis forceps. The lens was hydrodissected with BSS. The lens was then phacoemulsified using a divide and conquer technique. Residual cortical material was removed using automated irrigation and aspiration. The capsular bag was reformed using a viscoelastic. A ______ lens was inserted into the bag. Symmetric capsular bag fixation was confirmed. The remaining viscoelastic was removed with automated irrigation and aspiration. The wounds were hydrated and checked to be water tight. The lid speculum was removed. Antibiotic/steroid ointment was instilled in the eye and a shield was placed over the eye. The patient left the OR in stable condition having tolerated the procedure well. IKristan was present throughout the case.

## 2022-05-31 ENCOUNTER — APPOINTMENT (OUTPATIENT)
Dept: ULTRASOUND IMAGING | Facility: CLINIC | Age: 72
End: 2022-05-31

## 2022-06-01 ENCOUNTER — OUTPATIENT (OUTPATIENT)
Dept: OUTPATIENT SERVICES | Facility: HOSPITAL | Age: 72
LOS: 1 days | Discharge: HOME | End: 2022-06-01

## 2022-06-01 ENCOUNTER — APPOINTMENT (OUTPATIENT)
Dept: OPHTHALMOLOGY | Facility: AMBULATORY SURGERY CENTER | Age: 72
End: 2022-06-01

## 2022-06-01 LAB — SARS-COV-2 RNA SPEC QL NAA+PROBE: NEGATIVE — SIGNIFICANT CHANGE UP

## 2022-06-02 ENCOUNTER — APPOINTMENT (OUTPATIENT)
Dept: OPHTHALMOLOGY | Facility: CLINIC | Age: 72
End: 2022-06-02

## 2022-06-03 ENCOUNTER — APPOINTMENT (OUTPATIENT)
Dept: UROLOGY | Facility: CLINIC | Age: 72
End: 2022-06-03

## 2022-06-08 ENCOUNTER — APPOINTMENT (OUTPATIENT)
Dept: ULTRASOUND IMAGING | Facility: HOSPITAL | Age: 72
End: 2022-06-08

## 2022-06-10 ENCOUNTER — APPOINTMENT (OUTPATIENT)
Dept: OPHTHALMOLOGY | Facility: CLINIC | Age: 72
End: 2022-06-10

## 2022-06-24 ENCOUNTER — APPOINTMENT (OUTPATIENT)
Dept: UROLOGY | Facility: CLINIC | Age: 72
End: 2022-06-24

## 2022-07-07 DIAGNOSIS — R79.89 OTHER SPECIFIED ABNORMAL FINDINGS OF BLOOD CHEMISTRY: ICD-10-CM

## 2022-07-15 ENCOUNTER — APPOINTMENT (OUTPATIENT)
Dept: UROLOGY | Facility: CLINIC | Age: 72
End: 2022-07-15

## 2022-07-15 VITALS
DIASTOLIC BLOOD PRESSURE: 69 MMHG | OXYGEN SATURATION: 97 % | TEMPERATURE: 97.6 F | HEART RATE: 69 BPM | SYSTOLIC BLOOD PRESSURE: 106 MMHG

## 2022-07-15 PROCEDURE — 51784 ANAL/URINARY MUSCLE STUDY: CPT

## 2022-07-15 PROCEDURE — 51728 CYSTOMETROGRAM W/VP: CPT

## 2022-07-15 PROCEDURE — 81003 URINALYSIS AUTO W/O SCOPE: CPT | Mod: QW

## 2022-07-15 PROCEDURE — 51797 INTRAABDOMINAL PRESSURE TEST: CPT

## 2022-07-15 PROCEDURE — 51798 US URINE CAPACITY MEASURE: CPT

## 2022-07-15 PROCEDURE — 51741 ELECTRO-UROFLOWMETRY FIRST: CPT

## 2022-07-16 ENCOUNTER — NON-APPOINTMENT (OUTPATIENT)
Age: 72
End: 2022-07-16

## 2022-07-17 ENCOUNTER — EMERGENCY (EMERGENCY)
Facility: HOSPITAL | Age: 72
LOS: 1 days | Discharge: HOME | End: 2022-07-17
Attending: EMERGENCY MEDICINE | Admitting: EMERGENCY MEDICINE
Payer: MEDICARE

## 2022-07-17 VITALS
WEIGHT: 177.91 LBS | HEIGHT: 68 IN | SYSTOLIC BLOOD PRESSURE: 110 MMHG | TEMPERATURE: 98 F | RESPIRATION RATE: 15 BRPM | DIASTOLIC BLOOD PRESSURE: 68 MMHG | HEART RATE: 64 BPM | OXYGEN SATURATION: 96 %

## 2022-07-17 LAB
ANION GAP SERPL CALC-SCNC: 9 MMOL/L — SIGNIFICANT CHANGE UP (ref 5–17)
APPEARANCE UR: ABNORMAL
BACTERIA # UR AUTO: ABNORMAL /HPF
BILIRUB UR-MCNC: ABNORMAL
BUN SERPL-MCNC: 21 MG/DL — SIGNIFICANT CHANGE UP (ref 7–23)
CALCIUM SERPL-MCNC: 9.2 MG/DL — SIGNIFICANT CHANGE UP (ref 8.4–10.5)
CHLORIDE SERPL-SCNC: 102 MMOL/L — SIGNIFICANT CHANGE UP (ref 96–108)
CO2 SERPL-SCNC: 26 MMOL/L — SIGNIFICANT CHANGE UP (ref 22–31)
COLOR SPEC: ABNORMAL
CREAT SERPL-MCNC: 1.04 MG/DL — SIGNIFICANT CHANGE UP (ref 0.5–1.3)
DIFF PNL FLD: ABNORMAL
EGFR: 77 ML/MIN/1.73M2 — SIGNIFICANT CHANGE UP
EPI CELLS # UR: SIGNIFICANT CHANGE UP /HPF (ref 0–5)
GLUCOSE SERPL-MCNC: 106 MG/DL — HIGH (ref 70–99)
GLUCOSE UR QL: >=1000
HCT VFR BLD CALC: 42.1 % — SIGNIFICANT CHANGE UP (ref 39–50)
HGB BLD-MCNC: 14 G/DL — SIGNIFICANT CHANGE UP (ref 13–17)
HYALINE CASTS # UR AUTO: SIGNIFICANT CHANGE UP /LPF (ref 0–2)
KETONES UR-MCNC: ABNORMAL MG/DL
LEUKOCYTE ESTERASE UR-ACNC: ABNORMAL
MCHC RBC-ENTMCNC: 31.2 PG — SIGNIFICANT CHANGE UP (ref 27–34)
MCHC RBC-ENTMCNC: 33.3 GM/DL — SIGNIFICANT CHANGE UP (ref 32–36)
MCV RBC AUTO: 93.8 FL — SIGNIFICANT CHANGE UP (ref 80–100)
NITRITE UR-MCNC: POSITIVE
NRBC # BLD: 0 /100 WBCS — SIGNIFICANT CHANGE UP (ref 0–0)
PH UR: 7 — SIGNIFICANT CHANGE UP (ref 5–8)
PLATELET # BLD AUTO: 128 K/UL — LOW (ref 150–400)
POTASSIUM SERPL-MCNC: 5.3 MMOL/L — SIGNIFICANT CHANGE UP (ref 3.5–5.3)
POTASSIUM SERPL-SCNC: 5.3 MMOL/L — SIGNIFICANT CHANGE UP (ref 3.5–5.3)
PROT UR-MCNC: 100 MG/DL
RBC # BLD: 4.49 M/UL — SIGNIFICANT CHANGE UP (ref 4.2–5.8)
RBC # FLD: 13.8 % — SIGNIFICANT CHANGE UP (ref 10.3–14.5)
RBC CASTS # UR COMP ASSIST: ABNORMAL /HPF
SODIUM SERPL-SCNC: 137 MMOL/L — SIGNIFICANT CHANGE UP (ref 135–145)
SP GR SPEC: 1.01 — SIGNIFICANT CHANGE UP (ref 1–1.03)
UROBILINOGEN FLD QL: >=8 E.U./DL
WBC # BLD: 11.26 K/UL — HIGH (ref 3.8–10.5)
WBC # FLD AUTO: 11.26 K/UL — HIGH (ref 3.8–10.5)
WBC UR QL: ABNORMAL /HPF

## 2022-07-17 PROCEDURE — 81001 URINALYSIS AUTO W/SCOPE: CPT

## 2022-07-17 PROCEDURE — 85027 COMPLETE CBC AUTOMATED: CPT

## 2022-07-17 PROCEDURE — 36415 COLL VENOUS BLD VENIPUNCTURE: CPT

## 2022-07-17 PROCEDURE — 80048 BASIC METABOLIC PNL TOTAL CA: CPT

## 2022-07-17 PROCEDURE — 51702 INSERT TEMP BLADDER CATH: CPT

## 2022-07-17 PROCEDURE — 87086 URINE CULTURE/COLONY COUNT: CPT

## 2022-07-17 PROCEDURE — 99283 EMERGENCY DEPT VISIT LOW MDM: CPT

## 2022-07-17 NOTE — ED PROVIDER NOTE - PROGRESS NOTE DETAILS
placed joya, with 500cc output, already on cipro, to [facility] this [facility], pt states is able to, Discussed with pt results of work up, strict return precautions, and need for follow up.  Pt expressed understanding and agrees with plan.

## 2022-07-17 NOTE — ED PROVIDER NOTE - IMAGING STUDIES QUESTION 1 - DISCUSSED RESULTS WITH RADIOLOGIST
Patient scheduled for 4 month f/u with Dr. Webb on 2/14/17.    Patient discharged from [facility] on 1/6/17.    Kyleigh, daughter states [facility] recommends patient see's Dr. Webb this week.  Kyleigh, daughter states there were three medications prescribed for #30 days with 0 refills.  They are okay keeping the 2/14/17 appointment as long as refills could be sent before they run out before the appointment.  Preferred pharmacy entered.    The three medications are:  Disp Refills Start End       metoCLOPramide (REGLAN) 5 MG tablet 120 tablet 0 12/22/2016     Sig - Route: Take 1 tablet by mouth 4 times daily. Indications: Gastroparesis - Oral    Class: Add to AVS    metoCLOPramide (REGLAN) 5 MG tablet   Electronically signed by: Shannon Devries DO on 12/22/16 1108 Status: Active   Ordering user: Shannon Devries DO 12/22/16 1108 Ordering provider: Shannon Devries DO   Authorized by: Shannon Devries DO     Frequency: 4x Daily 12/22/16 - Until Discontinued           Disp Refills Start End       apixaban (ELIQUIS) 5 MG Tab 60 tablet 1 12/21/2016     Sig - Route: Take 1 tablet by mouth every 12 hours. - Oral    Class: Add to AVS    apixaban (ELIQUIS) 5 MG Tab   Electronically signed by: Shannon Devries DO on 12/21/16 1222 Status: Active   Ordering user: Shannon Devries DO 12/21/16 1222 Ordering provider: Shannon Devries DO   Authorized by: Shannon Devries DO     Frequency: Q12H JERED 12/21/16 - Until Discontinued           Disp Refills Start End       QUEtiapine (SEROQUEL) 25 MG tablet 30 tablet 0 12/21/2016     Sig - Route: Take 1 tablet by mouth every 12 hours as needed (delirium, agitation). - Oral    Class: Add to AVS    QUEtiapine (SEROQUEL) 25 MG tablet   Electronically signed by: Shannon Devries DO on 12/21/16 1222 Status: Active   Ordering user: Shannon Devries DO 12/21/16 1222 Ordering provider: Shannon Devries DO   Authorized by:  Shannon Devries, DO PRN Comment: delirium, agitation   Frequency: Q12H PRN 12/21/16 - Until Discontinued         Dr. Webb - Are you okay refilling these three medications?   Yes

## 2022-07-17 NOTE — ED PROVIDER NOTE - PATIENT PORTAL LINK FT
You can access the FollowMyHealth Patient Portal offered by [facility] by registering at the following website: [email or web address]. By joining [employer]’s FollowMyHealth portal, you will also be able to view your health information using other applications (apps) compatible with our system.

## 2022-07-17 NOTE — ED PROVIDER NOTE - CARE PROVIDER_API CALL
Abdulkadir Crawley)  Urology  [address], [address]  [place], [place] [id number]  Phone: [phone]  Fax: [phone]  Follow Up Time:

## 2022-07-17 NOTE — ED ADULT NURSE NOTE - OBJECTIVE STATEMENT
71/M ambulates to ED c/o urine retention x 3 days . Per patient he was on Pyridium  and Cipro x 1 day for UTI . Patient denies fever  and other medical complaints .

## 2022-07-17 NOTE — ED PROVIDER NOTE - OBJECTIVE STATEMENT
72 y/o m with PMH of HTN, CAD s/p 2 stents, PAD s/p 1 stent on xarelto, DM, HLD presents to ED in urinary retention since Thursday. Took cipro this morning. 72 y/o m with PMH of HTN, CAD s/p 2 stents, PAD s/p 1 stent on xarelto, DM, HLD presents to ED in urinary retention since Thursday. Took cipro this morning. s/p urodynamic testing on Friday after which developed urinary retention. s/p admission in March for UTI, acute prostatitis. no f/c, flank pain.

## 2022-07-17 NOTE — ED PROVIDER NOTE - NSFOLLOWUPINSTRUCTIONS_ED_ALL_ED_FT
Urinary Retention    Urinary retention is the inability to completely empty your bladder. This is a common problem in older men, especially with enlarged prostates. If you are sent home with a joya catheter and a drainage system make sure to keep the drainage bag emptied and lower than your catheter. Keep the joya catheter in until you follow up with a urologist.    SEEK IMMEDIATE MEDICAL CARE IF YOU DEVELOP THE FOLLOWING SYMPTOMS: the catheter stops draining urine, the catheter falls out, abdominal pain, nausea/vomiting, or chills/fever.

## 2022-07-17 NOTE — ED PROVIDER NOTE - PHYSICAL EXAMINATION

## 2022-07-17 NOTE — ED PROVIDER NOTE - CLINICAL SUMMARY MEDICAL DECISION MAKING FREE TEXT BOX
urinary retention Pt w urinary retention states since Thursday, already on cipro, no f/c, flank pain, suprapubic pain. Plan for labs, insertion of joya, reassess.

## 2022-07-18 ENCOUNTER — EMERGENCY (EMERGENCY)
Facility: HOSPITAL | Age: 72
LOS: 1 days | Discharge: HOME | End: 2022-07-18
Attending: EMERGENCY MEDICINE | Admitting: EMERGENCY MEDICINE
Payer: MEDICARE

## 2022-07-18 VITALS
RESPIRATION RATE: 20 BRPM | HEIGHT: 68 IN | OXYGEN SATURATION: 95 % | SYSTOLIC BLOOD PRESSURE: 97 MMHG | HEART RATE: 60 BPM | TEMPERATURE: 98 F | DIASTOLIC BLOOD PRESSURE: 64 MMHG

## 2022-07-18 VITALS
RESPIRATION RATE: 19 BRPM | DIASTOLIC BLOOD PRESSURE: 59 MMHG | HEART RATE: 62 BPM | TEMPERATURE: 98 F | SYSTOLIC BLOOD PRESSURE: 112 MMHG | OXYGEN SATURATION: 99 %

## 2022-07-18 LAB
ANION GAP SERPL CALC-SCNC: 8 MMOL/L — SIGNIFICANT CHANGE UP (ref 5–17)
APPEARANCE UR: ABNORMAL
BASOPHILS # BLD AUTO: 0.05 K/UL — SIGNIFICANT CHANGE UP (ref 0–0.2)
BASOPHILS NFR BLD AUTO: 0.6 % — SIGNIFICANT CHANGE UP (ref 0–2)
BILIRUB UR QL STRIP: NORMAL
BILIRUB UR-MCNC: NEGATIVE — SIGNIFICANT CHANGE UP
BUN SERPL-MCNC: 25 MG/DL — HIGH (ref 7–23)
CALCIUM SERPL-MCNC: 8.6 MG/DL — SIGNIFICANT CHANGE UP (ref 8.4–10.5)
CHLORIDE SERPL-SCNC: 102 MMOL/L — SIGNIFICANT CHANGE UP (ref 96–108)
CLARITY UR: CLEAR
CO2 SERPL-SCNC: 27 MMOL/L — SIGNIFICANT CHANGE UP (ref 22–31)
COLLECTION METHOD: NORMAL
COLOR SPEC: ABNORMAL
CREAT SERPL-MCNC: 0.94 MG/DL — SIGNIFICANT CHANGE UP (ref 0.5–1.3)
CULTURE RESULTS: NO GROWTH — SIGNIFICANT CHANGE UP
DIFF PNL FLD: ABNORMAL
EGFR: 87 ML/MIN/1.73M2 — SIGNIFICANT CHANGE UP
EOSINOPHIL # BLD AUTO: 0.2 K/UL — SIGNIFICANT CHANGE UP (ref 0–0.5)
EOSINOPHIL NFR BLD AUTO: 2.4 % — SIGNIFICANT CHANGE UP (ref 0–6)
GLUCOSE SERPL-MCNC: 100 MG/DL — HIGH (ref 70–99)
GLUCOSE UR QL: >=1000
GLUCOSE UR-MCNC: NORMAL
HCG UR QL: 0.2 EU/DL
HCT VFR BLD CALC: 41.4 % — SIGNIFICANT CHANGE UP (ref 39–50)
HGB BLD-MCNC: 13.4 G/DL — SIGNIFICANT CHANGE UP (ref 13–17)
HGB UR QL STRIP.AUTO: NORMAL
IMM GRANULOCYTES NFR BLD AUTO: 0.2 % — SIGNIFICANT CHANGE UP (ref 0–1.5)
KETONES UR-MCNC: NEGATIVE — SIGNIFICANT CHANGE UP
KETONES UR-MCNC: NORMAL
LEUKOCYTE ESTERASE UR QL STRIP: NORMAL
LEUKOCYTE ESTERASE UR-ACNC: ABNORMAL
LYMPHOCYTES # BLD AUTO: 1.32 K/UL — SIGNIFICANT CHANGE UP (ref 1–3.3)
LYMPHOCYTES # BLD AUTO: 16 % — SIGNIFICANT CHANGE UP (ref 13–44)
MCHC RBC-ENTMCNC: 30.9 PG — SIGNIFICANT CHANGE UP (ref 27–34)
MCHC RBC-ENTMCNC: 32.4 GM/DL — SIGNIFICANT CHANGE UP (ref 32–36)
MCV RBC AUTO: 95.6 FL — SIGNIFICANT CHANGE UP (ref 80–100)
MONOCYTES # BLD AUTO: 0.93 K/UL — HIGH (ref 0–0.9)
MONOCYTES NFR BLD AUTO: 11.2 % — SIGNIFICANT CHANGE UP (ref 2–14)
NEUTROPHILS # BLD AUTO: 5.75 K/UL — SIGNIFICANT CHANGE UP (ref 1.8–7.4)
NEUTROPHILS NFR BLD AUTO: 69.6 % — SIGNIFICANT CHANGE UP (ref 43–77)
NITRITE UR QL STRIP: NORMAL
NITRITE UR-MCNC: POSITIVE
NRBC # BLD: 0 /100 WBCS — SIGNIFICANT CHANGE UP (ref 0–0)
PH UR STRIP: 7
PH UR: 6 — SIGNIFICANT CHANGE UP (ref 5–8)
PLATELET # BLD AUTO: 118 K/UL — LOW (ref 150–400)
POTASSIUM SERPL-MCNC: 4.6 MMOL/L — SIGNIFICANT CHANGE UP (ref 3.5–5.3)
POTASSIUM SERPL-SCNC: 4.6 MMOL/L — SIGNIFICANT CHANGE UP (ref 3.5–5.3)
PROT UR STRIP-MCNC: NORMAL
PROT UR-MCNC: 30 MG/DL
RBC # BLD: 4.33 M/UL — SIGNIFICANT CHANGE UP (ref 4.2–5.8)
RBC # FLD: 14 % — SIGNIFICANT CHANGE UP (ref 10.3–14.5)
SODIUM SERPL-SCNC: 137 MMOL/L — SIGNIFICANT CHANGE UP (ref 135–145)
SP GR SPEC: 1.01 — SIGNIFICANT CHANGE UP (ref 1–1.03)
SP GR UR STRIP: 1.01
SPECIMEN SOURCE: SIGNIFICANT CHANGE UP
UROBILINOGEN FLD QL: 1 E.U./DL — SIGNIFICANT CHANGE UP
WBC # BLD: 8.27 K/UL — SIGNIFICANT CHANGE UP (ref 3.8–10.5)
WBC # FLD AUTO: 8.27 K/UL — SIGNIFICANT CHANGE UP (ref 3.8–10.5)

## 2022-07-18 PROCEDURE — 85025 COMPLETE CBC W/AUTO DIFF WBC: CPT

## 2022-07-18 PROCEDURE — 99284 EMERGENCY DEPT VISIT MOD MDM: CPT

## 2022-07-18 PROCEDURE — 81001 URINALYSIS AUTO W/SCOPE: CPT

## 2022-07-18 PROCEDURE — 36415 COLL VENOUS BLD VENIPUNCTURE: CPT

## 2022-07-18 PROCEDURE — 80048 BASIC METABOLIC PNL TOTAL CA: CPT

## 2022-07-18 RX ORDER — OXYBUTYNIN CHLORIDE 5 MG
1 TABLET ORAL
Qty: 9 | Refills: 0
Start: 2022-07-18 | End: 2022-07-20

## 2022-07-18 RX ORDER — OXYBUTYNIN CHLORIDE 5 MG
5 TABLET ORAL ONCE
Refills: 0 | Status: COMPLETED | OUTPATIENT
Start: 2022-07-18 | End: 2022-07-18

## 2022-07-18 RX ORDER — RIVAROXABAN 15 MG-20MG
20 KIT ORAL ONCE
Refills: 0 | Status: COMPLETED | OUTPATIENT
Start: 2022-07-18 | End: 2022-07-18

## 2022-07-18 RX ADMIN — Medication 5 MILLIGRAM(S): at 04:40

## 2022-07-18 RX ADMIN — RIVAROXABAN 20 MILLIGRAM(S): KIT at 04:40

## 2022-07-18 NOTE — ED ADULT NURSE NOTE - NSIMPLEMENTINTERV_GEN_ALL_ED
Implemented All Universal Safety Interventions:  Francisco to call system. Call bell, personal items and telephone within reach. Instruct patient to call for assistance. Room bathroom lighting operational. Non-slip footwear when patient is off stretcher. Physically safe environment: no spills, clutter or unnecessary equipment. Stretcher in lowest position, wheels locked, appropriate side rails in place.

## 2022-07-18 NOTE — ED PROVIDER NOTE - CLINICAL SUMMARY MEDICAL DECISION MAKING FREE TEXT BOX
discomfort post joya placement  appears to be draining appropriately  initial bp low, improved on reassessment without intervention  abdomen soft non tender, no signs of obstruction. labs done with normal wbc/ renal function. already on cipro for uti  will add ditropan for possible spasms of bladder  requested dose of his normal xarelto, given.   f/u with his urologist

## 2022-07-18 NOTE — ED PROVIDER NOTE - NSFOLLOWUPINSTRUCTIONS_ED_ALL_ED_FT
Please see your urologist for followup.  Call for appointment.  If you have any problems with followup, please call the ED Referral Coordinator at [phone].  Return to the ER if symptoms worsen or other concerns.    Indwelling Urinary Catheter Insertion, Care After  This sheet gives you information about how to care for yourself after your procedure. Your health care provider may also give you more specific instructions. If you have problems or questions, contact your health care provider.    What can I expect after the procedure?  After the procedure, it is common to have:  Slight discomfort around your urethra where the catheter enters your body.  Follow these instructions at home:  Image   Keep the drainage bag at or below the level of your bladder. Doing this ensures that urine can only drain out, not back into your body.  Secure the catheter tubing and drainage bag to your leg or thigh to keep it from moving.  Check the catheter tubing regularly to make sure there are no kinks or blockages.  Take showers daily to keep the catheter clean. Do not take a bath.  Do not pull on your catheter or try to remove it.  Disconnect the tubing and drainage bag as little as possible.  Empty the drainage bag every 2–4 hours, or more often if needed. Do not let the bag get completely full.  Wash your hands with soap and water before and after touching the catheter, tubing, or drainage bag.  Do not let the drainage bag or catheter tubing touch the floor.  Drink enough fluids to keep your urine clear or pale yellow, or as told by your health care provider.  Contact a health care provider if:  Urine stops flowing into the drainage bag.  You feel pain or pressure in the bladder area.  You have back pain.  Your catheter gets clogged.  Your catheter starts to leak.  Your urine looks cloudy.  Your drainage bag or tubing looks dirty.  You notice a bad smell when emptying your drainage bag.  Get help right away if:  You have a fever or chills.  You have severe pain in your back or your lower abdomen.  You have warmth, redness, swelling, or pain in the urethra area.  You notice blood in your urine.  Your catheter gets pulled out.  Summary  Do not pull on your catheter or try to remove it.  Keep the drainage bag at or below the level of your bladder, but do not let the drainage bag or catheter tubing touch the floor.  Wash your hands with soap and water before and after touching the catheter, tubing, or drainage bag.  Contact your health care provider if you have a fever, chills, or any other signs of infection.  This information is not intended to replace advice given to you by your health care provider. Make sure you discuss any questions you have with your health care provider.

## 2022-07-18 NOTE — ED ADULT TRIAGE NOTE - CHIEF COMPLAINT QUOTE
pt. had joya catheter inserted yesterday, pt. states it doesn't feel right, c/o lower abd pressure, insertion site hurting

## 2022-07-18 NOTE — ED PROVIDER NOTE - PATIENT PORTAL LINK FT
You can access the FollowMyHealth Patient Portal offered by [facility] by registering at the following website: [email or web address]. By joining [employer]’s FollowMyHealth portal, you will also be able to view your health information using other applications (apps) compatible with our system.

## 2022-07-18 NOTE — ED PROVIDER NOTE - OBJECTIVE STATEMENT
PMH of HTN, CAD s/p 2 stents, PAD s/p 1 stent on xarelto, DM, HLD, seen yesterday for urinary retention and had joya placed here with discomfort with joya in penis/ lower abdomen. Denies fever, chills, n/v, lightheadedness/ dizziness. Currently taking cipro for uti. Notes catheter draining clear yellow urine.

## 2022-07-19 ENCOUNTER — APPOINTMENT (OUTPATIENT)
Dept: UROLOGY | Facility: CLINIC | Age: 72
End: 2022-07-19

## 2022-07-19 PROCEDURE — 99214 OFFICE O/P EST MOD 30 MIN: CPT

## 2022-07-19 NOTE — ASSESSMENT
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  He was first found to have a large postvoid residual at the time of hospitalization at [facility] when he experienced a mild cerebrovascular accident with no sequela.  At that time catheterization was recommended he however refused.  He was recently evaluated by Dr. Lilian Marrero who performed office cystoscopy on the patient.  Subsequently he went into urinary retention.  He was unable to tolerate a Seymour catheter.  He had straight catheterization and has subsequently been voiding well.  He denies any urinary symptoms.  His flow rate today was 10.7 cc/s with a voided volume of 217 cc.  His postvoid residual was 395 cc.  He currently is on Flomax.\par \par \par I discussed with BASIL MELLO the possible indications for treatment of prostatic obstruction including:\par 1 urinary symptoms and quality of life issues\par 2.  Obstruction and urinary retention\par 3.  Infections\par 4.  Bladder stones\par 5.  Upper tract changes including hydronephrosis and renal dysfunction\par \par We discussed medical management with alpha blocking agents (alfuzosin, tamsulosin doxazosin etc.)\par We discussed the addition of finasteride to his regimen\par We briefly discussed PROSTATE ARTERY EMBOLIZATION and HOLUP procedures.\par He has expressed his inability to have a Seymour catheter due to discomfort.  He is unwilling to have any procedure that results in catheterization.\par \par In light of his diabetes which have been poorly controlled we discussed proceeding with urodynamic evaluation to assess his bladder outlet obstruction versus neurogenic bladder.  We discussed the fact that even in the face of bladder dysfunction relief of bladder outlet would be in general the appropriate course of action.\par \par Previous MRI demonstrated multiple adrenal nodules.  These have not been followed up.  We discussed proceeding with a CT with contrast to evaluate these.  He understands the need for this.  We discussed the current shortage of contrast material and will defer this for the time being.\par \par We discussed finding of hernia.  He is aware based upon prior MRI.  He is asymtomatic\par Plan:\par 1.  Renal ultrasound rule out hydronephrosis\par 2.  UDS with Dr. Valdez re  bladder outlet obstruction versus neurogenic bladder\par 3. continue flomax\par 4. followup re management\par \par The BASIL MELLO  expressed fully understanding of the information provided, the consequences and the management.\par \par \par alpha blockers and finasteride\par \par surgical intervention\par \par 7.19.2022\par Patient underwent urodynamic evaluation by Dr. Durant\par Dr. Durant  describes poor bladder contractility and bladder outlet obstruction. Discussed with Dr Durant\par The patient subsequently went into urinary retention and was seen in the emergency room.  He was found to have a urinary tract infection and was discharged with a Seymour catheter.\par Cultures were positive for:\par enterococcus\par discharged on CIPRO\par reviewed with options again:\par 1. continue Seymour catheter\par 2. complete antibiotics\par 3. unwilling to proceed with CIC\par 4. 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  Reviewed options.  Patient wants radical prostatectomy.  Explained increased morbidity aasociated with RPP.\par Recommend:\par 1. consultation re HolUP\par 2 continue catheter drainage\par 3. stop po antibiotics\par \par

## 2022-07-19 NOTE — HISTORY OF PRESENT ILLNESS
[FreeTextEntry1] : 71 year old male \par recently hospitalized at [facility]\par mild CVA and urinary retention\par uncontrolled diabetes at that time 12 no 5.7\par recommended catheterization but refused \par started on Flomax 0.8 mg\par had cystoscopy  several days ago\par after cystoscopy had catheter inserted and then removed and went into urinary retention and had straght catheter and has now voiding\par \par MRI  - bladder distention; prostate volume 48; no hydro; bladder wall trabeculation\par \par Cystoscopy - 5 cm; candidate for urolift\par \par 7.19.2022\par returns after UDS and Urinary retention with UTI\par

## 2022-07-20 DIAGNOSIS — E78.5 HYPERLIPIDEMIA, UNSPECIFIED: ICD-10-CM

## 2022-07-20 DIAGNOSIS — E13.51 OTHER SPECIFIED DIABETES MELLITUS WITH DIABETIC PERIPHERAL ANGIOPATHY WITHOUT GANGRENE: ICD-10-CM

## 2022-07-20 DIAGNOSIS — R33.9 RETENTION OF URINE, UNSPECIFIED: ICD-10-CM

## 2022-07-20 DIAGNOSIS — I25.10 ATHEROSCLEROTIC HEART DISEASE OF NATIVE CORONARY ARTERY WITHOUT ANGINA PECTORIS: ICD-10-CM

## 2022-07-20 DIAGNOSIS — Z79.82 LONG TERM (CURRENT) USE OF ASPIRIN: ICD-10-CM

## 2022-07-20 DIAGNOSIS — N39.0 URINARY TRACT INFECTION, SITE NOT SPECIFIED: ICD-10-CM

## 2022-07-20 DIAGNOSIS — Z79.84 LONG TERM (CURRENT) USE OF ORAL HYPOGLYCEMIC DRUGS: ICD-10-CM

## 2022-07-20 DIAGNOSIS — I48.91 UNSPECIFIED ATRIAL FIBRILLATION: ICD-10-CM

## 2022-07-20 DIAGNOSIS — Z79.01 LONG TERM (CURRENT) USE OF ANTICOAGULANTS: ICD-10-CM

## 2022-07-20 DIAGNOSIS — T83.091A OTHER MECHANICAL COMPLICATION OF INDWELLING URETHRAL CATHETER, INITIAL ENCOUNTER: ICD-10-CM
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
## 2022-07-20 DIAGNOSIS — Z95.5 PRESENCE OF CORONARY ANGIOPLASTY IMPLANT AND GRAFT: ICD-10-CM

## 2022-07-20 DIAGNOSIS — I10 ESSENTIAL (PRIMARY) HYPERTENSION: ICD-10-CM
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
## 2022-07-21 ENCOUNTER — APPOINTMENT (OUTPATIENT)
Dept: UROLOGY | Facility: CLINIC | Age: 72
End: 2022-07-21

## 2022-07-21 ENCOUNTER — NON-APPOINTMENT (OUTPATIENT)
Age: 72
End: 2022-07-21

## 2022-07-21 ENCOUNTER — APPOINTMENT (OUTPATIENT)
Dept: HEART AND VASCULAR | Facility: CLINIC | Age: 72
End: 2022-07-21

## 2022-07-21 VITALS — HEART RATE: 76 BPM | TEMPERATURE: 98 F | SYSTOLIC BLOOD PRESSURE: 95 MMHG | DIASTOLIC BLOOD PRESSURE: 59 MMHG

## 2022-07-21 PROCEDURE — 99214 OFFICE O/P EST MOD 30 MIN: CPT | Mod: 25

## 2022-07-21 PROCEDURE — 51702 INSERT TEMP BLADDER CATH: CPT

## 2022-07-21 NOTE — HISTORY OF PRESENT ILLNESS
[FreeTextEntry1] : 71 year old man with history CAD s/p 2 stents, diabetes, PVD, admitted to [facility] earlier this year with mild CVA, urinary retention, prostatitis. Hemoglobin A1c was 12 at that time. Catheter was recommended, but patient declined. He was started on flomax 0.8 mg. He subsequently had cystoscopy that showed a very distended bladder with grade 3 trabeculations. He went into urinary retention after the cystoscopy. He then had a urodynamic study that demonstrated decreased bladder sensation, increased bladder capacity, decreased contractility with decreased flow rate, incomplete bladder emptying. He went into urinary retention after the UDS. A catheter was placed. He removed the catheter this morning and has been voiding in 200 cc increments since then.\par \par MRI prostate:48 cc prostate, no concerning lesions

## 2022-07-21 NOTE — ASSESSMENT
[FreeTextEntry1] : Assessment: Mr. QUINCY MELLO  is a 71 year year old man with incomplete emptying and recurrent urinary retention, 48 cc prostate, cystoscopy consistent with bladder outlet obstruction, UDS demonstrated decrease bladder contractility and high bladder capacity. He appears to have an underactive bladder, likely secondary to chronic bladder outlet obstruction vs diabetic cystopathy. \par \par We discussed different therapeutic options including TURP, PVP, simple prostatectomy (open, laparoscopic or transurethral laser enucleation), Aquablation, Urolift therapy, and Rezum in addition to full continuation of medical therapy. Discussed the issues of incontinence, retrograde ejaculation, erectile dysfunction, irritative voiding symptoms, injury to urethra and bladder, persistence of irritative voiding symptoms, urethral stricture or bladder neck contracture, need for open surgery to repair the injury, erectile dysfunction and infertility. He is most interested in HoLEP, therefore, more time was spent discussing the risks and benefits of this procedure.\par  - HoLEP/ThuLEP in the OR\par  - Pre-op clearance\par

## 2022-07-21 NOTE — ASSESSMENT
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  He was first found to have a large postvoid residual at the time of hospitalization at [facility] when he experienced a mild cerebrovascular accident with no sequela.  At that time catheterization was recommended he however refused.  He was recently evaluated by Dr. Lilian Marrero who performed office cystoscopy on the patient.  Subsequently he went into urinary retention.  He was unable to tolerate a Seymour catheter.  He had straight catheterization and has subsequently been voiding well.  He denies any urinary symptoms.  His flow rate today was 10.7 cc/s with a voided volume of 217 cc.  His postvoid residual was 395 cc.  He currently is on Flomax.\par \par \par I discussed with BASIL MELLO the possible indications for treatment of prostatic obstruction including:\par 1 urinary symptoms and quality of life issues\par 2.  Obstruction and urinary retention\par 3.  Infections\par 4.  Bladder stones\par 5.  Upper tract changes including hydronephrosis and renal dysfunction\par \par We discussed medical management with alpha blocking agents (alfuzosin, tamsulosin doxazosin etc.)\par We discussed the addition of finasteride to his regimen\par We briefly discussed PROSTATE ARTERY EMBOLIZATION and HOLUP procedures.\par He has expressed his inability to have a Seymour catheter due to discomfort.  He is unwilling to have any procedure that results in catheterization.\par \par In light of his diabetes which have been poorly controlled we discussed proceeding with urodynamic evaluation to assess his bladder outlet obstruction versus neurogenic bladder.  We discussed the fact that even in the face of bladder dysfunction relief of bladder outlet would be in general the appropriate course of action.\par \par Previous MRI demonstrated multiple adrenal nodules.  These have not been followed up.  We discussed proceeding with a CT with contrast to evaluate these.  He understands the need for this.  We discussed the current shortage of contrast material and will defer this for the time being.\par \par We discussed finding of hernia.  He is aware based upon prior MRI.  He is asymtomatic\par Plan:\par 1.  Renal ultrasound rule out hydronephrosis\par 2.  UDS with Dr. Valdez re  bladder outlet obstruction versus neurogenic bladder\par 3. continue flomax\par 4. followup re management\par \par The BASIL MELLO  expressed fully understanding of the information provided, the consequences and the management.\par \par \par alpha blockers and finasteride\par \par surgical intervention\par \par 7.19.2022\par Patient underwent urodynamic evaluation by Dr. Durant\par Dr. Durant  describes poor bladder contractility and bladder outlet obstruction. Discussed with Dr Durant\par The patient subsequently went into urinary retention and was seen in the emergency room.  He was found to have a urinary tract infection and was discharged with a Seymour catheter.\par Cultures were positive for:\par enterococcus\par discharged on CIPRO\par reviewed with options again:\par 1. continue Seymour catheter\par 2. complete antibiotics\par 3. unwilling to proceed with CIC\par 4. 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  Reviewed options.  Patient wants radical prostatectomy.  Explained increased morbidity aasociated with RPP.\par Recommend:\par 1. consultation re HolUP\par 2 continue catheter drainage\par 3. stop po antibiotics\par \par \par 7.21.2022\par patient removed catheter this AM and has not been able to void\par complaining of suprapubic discomfort\par PVR > 500\par agreed to catheterization\par surgery scheduled Dr Peoples

## 2022-07-21 NOTE — HISTORY OF PRESENT ILLNESS
[FreeTextEntry1] : 71 year old male \par recently hospitalized at [facility]\par mild CVA and urinary retention\par uncontrolled diabetes at that time 12 no 5.7\par recommended catheterization but refused \par started on Flomax 0.8 mg\par had cystoscopy  several days ago\par after cystoscopy had catheter inserted and then removed and went into urinary retention and had straght catheter and has now voiding\par \par MRI  - bladder distention; prostate volume 48; no hydro; bladder wall trabeculation\par \par Cystoscopy - 5 cm; candidate for urolift\par \par 7.19.2022\par returns after UDS and Urinary retention with UTI\par \par 7..21.2022\par patient removed cather last evening and has been voidng small amounts\par complaining of lower abdominal discomfort\par

## 2022-07-26 LAB
ALBUMIN SERPL ELPH-MCNC: 4.2 G/DL
ALP BLD-CCNC: 53 U/L
ALT SERPL-CCNC: 24 U/L
ANION GAP SERPL CALC-SCNC: 13 MMOL/L
APTT BLD: 44.4 SEC
AST SERPL-CCNC: 21 U/L
BACTERIA UR CULT: NORMAL
BASOPHILS # BLD AUTO: 0.06 K/UL
BASOPHILS NFR BLD AUTO: 1.1 %
BILIRUB SERPL-MCNC: 0.4 MG/DL
BUN SERPL-MCNC: 24 MG/DL
CALCIUM SERPL-MCNC: 9.3 MG/DL
CHLORIDE SERPL-SCNC: 105 MMOL/L
CO2 SERPL-SCNC: 25 MMOL/L
CREAT SERPL-MCNC: 0.82 MG/DL
EGFR: 94 ML/MIN/1.73M2
EOSINOPHIL # BLD AUTO: 0.17 K/UL
EOSINOPHIL NFR BLD AUTO: 3.1 %
GLUCOSE SERPL-MCNC: 87 MG/DL
HCT VFR BLD CALC: 44 %
HGB BLD-MCNC: 14.1 G/DL
IMM GRANULOCYTES NFR BLD AUTO: 0.2 %
INR PPP: 1.83 RATIO
LYMPHOCYTES # BLD AUTO: 1.18 K/UL
LYMPHOCYTES NFR BLD AUTO: 21.3 %
MAN DIFF?: NORMAL
MCHC RBC-ENTMCNC: 31.2 PG
MCHC RBC-ENTMCNC: 32 GM/DL
MCV RBC AUTO: 97.3 FL
MONOCYTES # BLD AUTO: 0.61 K/UL
MONOCYTES NFR BLD AUTO: 11 %
NEUTROPHILS # BLD AUTO: 3.52 K/UL
NEUTROPHILS NFR BLD AUTO: 63.3 %
PLATELET # BLD AUTO: 105 K/UL
POTASSIUM SERPL-SCNC: 4.6 MMOL/L
PROT SERPL-MCNC: 6.7 G/DL
PT BLD: 21.3 SEC
RBC # BLD: 4.52 M/UL
RBC # FLD: 14.6 %
SODIUM SERPL-SCNC: 142 MMOL/L
WBC # FLD AUTO: 5.55 K/UL

## 2022-07-27 ENCOUNTER — APPOINTMENT (OUTPATIENT)
Dept: UROLOGY | Facility: CLINIC | Age: 72
End: 2022-07-27

## 2022-07-27 PROCEDURE — 99214 OFFICE O/P EST MOD 30 MIN: CPT

## 2022-07-28 LAB
APPEARANCE: ABNORMAL
BACTERIA: NEGATIVE
BILIRUBIN URINE: NEGATIVE
BLOOD URINE: ABNORMAL
COLOR: ABNORMAL
GLUCOSE QUALITATIVE U: ABNORMAL
HYALINE CASTS: 1 /LPF
KETONES URINE: NEGATIVE
LEUKOCYTE ESTERASE URINE: NEGATIVE
MICROSCOPIC-UA: NORMAL
NITRITE URINE: NEGATIVE
PH URINE: 7.5
PROTEIN URINE: ABNORMAL
RED BLOOD CELLS URINE: >720 /HPF
SPECIFIC GRAVITY URINE: 1.03
SQUAMOUS EPITHELIAL CELLS: 1 /HPF
UROBILINOGEN URINE: NORMAL
WHITE BLOOD CELLS URINE: 5 /HPF

## 2022-07-28 NOTE — HISTORY OF PRESENT ILLNESS
[FreeTextEntry1] : 71 year old man with history CAD s/p 2 stents, diabetes, PVD, admitted to [facility] earlier this year with mild CVA, urinary retention, prostatitis. Hemoglobin A1c was 12 at that time. Catheter was recommended, but patient declined. He was started on flomax 0.8 mg. He subsequently had cystoscopy that showed a very distended bladder with grade 3 trabeculations. He went into urinary retention after the cystoscopy. He then had a urodynamic study that demonstrated decreased bladder sensation, increased bladder capacity, decreased contractility with decreased flow rate, incomplete bladder emptying. He went into urinary retention after the UDS. A catheter was placed. He removed the catheter this morning and has been voiding in 200 cc increments since then.\par \par MRI prostate:48 cc prostate, no concerning lesions\par \par 7/27/22: urgent follow up for discomfort from catheter. He feels penile pain. He is concerned he has a UTI. He denies fevers or chills. Catheter is draining well.

## 2022-07-28 NOTE — ASSESSMENT
[FreeTextEntry1] : Patient presents for urgent follow up for catheter discomfort. Catheter is draining well. Urine culture and swab of urethral discharge sent for culture. Will treat with antibiotics as needed.\par  - F/U cultures\par  - Antibiotics based on cultures\par  - ThuLEP/HoLEP as planned, will search for earlier date

## 2022-07-28 NOTE — PHYSICAL EXAM
[General Appearance - Well Developed] : well developed [General Appearance - Well Nourished] : well nourished [Normal Appearance] : normal appearance [Well Groomed] : well groomed [General Appearance - In No Acute Distress] : no acute distress [Abdomen Soft] : soft [Abdomen Tenderness] : non-tender [Costovertebral Angle Tenderness] : no ~M costovertebral angle tenderness [Urethral Meatus] : meatus normal [Urinary Bladder Findings] : the bladder was normal on palpation [Scrotum] : the scrotum was normal [Testes Mass (___cm)] : there were no testicular masses [FreeTextEntry1] : Catheter in place with cloudy urine, discharge around catheter [Edema] : no peripheral edema [] : no respiratory distress [Respiration, Rhythm And Depth] : normal respiratory rhythm and effort [Exaggerated Use Of Accessory Muscles For Inspiration] : no accessory muscle use [Oriented To Time, Place, And Person] : oriented to person, place, and time [Affect] : the affect was normal [Mood] : the mood was normal [Not Anxious] : not anxious [Normal Station and Gait] : the gait and station were normal for the patient's age [No Focal Deficits] : no focal deficits

## 2022-08-01 ENCOUNTER — NON-APPOINTMENT (OUTPATIENT)
Age: 72
End: 2022-08-01

## 2022-08-02 ENCOUNTER — APPOINTMENT (OUTPATIENT)
Dept: UROLOGY | Facility: CLINIC | Age: 72
End: 2022-08-02

## 2022-08-02 VITALS
WEIGHT: 188 LBS | DIASTOLIC BLOOD PRESSURE: 71 MMHG | RESPIRATION RATE: 18 BRPM | BODY MASS INDEX: 28.49 KG/M2 | TEMPERATURE: 97.8 F | SYSTOLIC BLOOD PRESSURE: 131 MMHG | HEIGHT: 68 IN | HEART RATE: 77 BPM

## 2022-08-02 DIAGNOSIS — N39.0 URINARY TRACT INFECTION, SITE NOT SPECIFIED: ICD-10-CM

## 2022-08-02 DIAGNOSIS — A49.9 URINARY TRACT INFECTION, SITE NOT SPECIFIED: ICD-10-CM

## 2022-08-02 PROCEDURE — 99214 OFFICE O/P EST MOD 30 MIN: CPT

## 2022-08-02 NOTE — ASSESSMENT
[FreeTextEntry1] : Patient presents for urgent follow up for catheter discomfort and nausea. He is constipated, which is likely contributing to his symptoms. Encouraged bowel regimen and weaning off of oxybutynin. Will start keflex based on prior culture for possible UTI in anticipation of procedure next week.\par  - Keflex x 10 days\par  - ThuLEP/HoLEP 8/11/22\par

## 2022-08-02 NOTE — HISTORY OF PRESENT ILLNESS
[FreeTextEntry1] : 71 year old man with history CAD s/p 2 stents, diabetes, PVD, admitted to [facility] earlier this year with mild CVA, urinary retention, prostatitis. Hemoglobin A1c was 12 at that time. Catheter was recommended, but patient declined. He was started on flomax 0.8 mg. He subsequently had cystoscopy that showed a very distended bladder with grade 3 trabeculations. He went into urinary retention after the cystoscopy. He then had a urodynamic study that demonstrated decreased bladder sensation, increased bladder capacity, decreased contractility with decreased flow rate, incomplete bladder emptying. He went into urinary retention after the UDS. A catheter was placed. He removed the catheter this morning and has been voiding in 200 cc increments since then.\par \par MRI prostate:48 cc prostate, no concerning lesions\par \par 7/27/22: urgent follow up for discomfort from catheter. He feels penile pain. He is concerned he has a UTI. He denies fevers or chills. Catheter is draining well.\par \par 8/2/22: Presents with nausea without vomiting and catheter discomfort. He has been constipated with no BM in last 3 days. He feels bladder spasms and has been taking oxybutynin three times daily. No fevers, chills, hematuria. \par \par Urine culture from 7/27/22 with coag negative staph.

## 2022-08-03 LAB
BACTERIA UR CULT: ABNORMAL
MYCOPLASMA HOMINIS CULTURE: NEGATIVE
UREAPLASMA CULTURE: NEGATIVE

## 2022-08-05 ENCOUNTER — NON-APPOINTMENT (OUTPATIENT)
Age: 72
End: 2022-08-05

## 2022-08-09 ENCOUNTER — APPOINTMENT (OUTPATIENT)
Dept: OPHTHALMOLOGY | Facility: CLINIC | Age: 72
End: 2022-08-09

## 2022-08-09 ENCOUNTER — NON-APPOINTMENT (OUTPATIENT)
Age: 72
End: 2022-08-09

## 2022-08-09 PROCEDURE — 92012 INTRM OPH EXAM EST PATIENT: CPT

## 2022-08-10 ENCOUNTER — TRANSCRIPTION ENCOUNTER (OUTPATIENT)
Age: 72
End: 2022-08-10

## 2022-08-10 VITALS
HEART RATE: 74 BPM | OXYGEN SATURATION: 96 % | HEIGHT: 68.5 IN | SYSTOLIC BLOOD PRESSURE: 111 MMHG | DIASTOLIC BLOOD PRESSURE: 68 MMHG | RESPIRATION RATE: 16 BRPM | WEIGHT: 182.76 LBS | TEMPERATURE: 97 F

## 2022-08-10 NOTE — ASU PATIENT PROFILE, ADULT - FALL HARM RISK - UNIVERSAL INTERVENTIONS
Bed in lowest position, wheels locked, appropriate side rails in place/Call bell, personal items and telephone in reach/Instruct patient to call for assistance before getting out of bed or chair/Non-slip footwear when patient is out of bed/Welsh to call system/Physically safe environment - no spills, clutter or unnecessary equipment/Purposeful Proactive Rounding/Room/bathroom lighting operational, light cord in reach

## 2022-08-11 ENCOUNTER — RESULT REVIEW (OUTPATIENT)
Age: 72
End: 2022-08-11

## 2022-08-11 ENCOUNTER — INPATIENT (INPATIENT)
Facility: HOSPITAL | Age: 72
LOS: 2 days | Discharge: HOME | DRG: 713 | End: 2022-08-14
Attending: SURGERY | Admitting: SURGERY
Payer: MEDICARE

## 2022-08-11 ENCOUNTER — APPOINTMENT (OUTPATIENT)
Dept: UROLOGY | Facility: HOSPITAL | Age: 72
End: 2022-08-11

## 2022-08-11 ENCOUNTER — TRANSCRIPTION ENCOUNTER (OUTPATIENT)
Age: 72
End: 2022-08-11

## 2022-08-11 DIAGNOSIS — N40.1 BENIGN PROSTATIC HYPERPLASIA WITH LOWER URINARY TRACT SYMPTOMS: ICD-10-CM

## 2022-08-11 DIAGNOSIS — I48.20 CHRONIC ATRIAL FIBRILLATION, UNSPECIFIED: ICD-10-CM

## 2022-08-11 LAB
ANION GAP SERPL CALC-SCNC: 6 MMOL/L — SIGNIFICANT CHANGE UP (ref 5–17)
BUN SERPL-MCNC: 12 MG/DL — SIGNIFICANT CHANGE UP (ref 7–23)
CALCIUM SERPL-MCNC: 7.1 MG/DL — LOW (ref 8.4–10.5)
CHLORIDE SERPL-SCNC: 115 MMOL/L — HIGH (ref 96–108)
CK MB CFR SERPL CALC: 5.6 NG/ML — SIGNIFICANT CHANGE UP (ref 0–6.7)
CK SERPL-CCNC: 126 U/L — SIGNIFICANT CHANGE UP (ref 30–200)
CO2 SERPL-SCNC: 24 MMOL/L — SIGNIFICANT CHANGE UP (ref 22–31)
CREAT SERPL-MCNC: 0.76 MG/DL — SIGNIFICANT CHANGE UP (ref 0.5–1.3)
EGFR: 97 ML/MIN/1.73M2 — SIGNIFICANT CHANGE UP
GLUCOSE BLDC GLUCOMTR-MCNC: 104 MG/DL — HIGH (ref 70–99)
GLUCOSE BLDC GLUCOMTR-MCNC: 106 MG/DL — HIGH (ref 70–99)
GLUCOSE BLDC GLUCOMTR-MCNC: 113 MG/DL — HIGH (ref 70–99)
GLUCOSE SERPL-MCNC: 110 MG/DL — HIGH (ref 70–99)
HCT VFR BLD CALC: 39.7 % — SIGNIFICANT CHANGE UP (ref 39–50)
HGB BLD-MCNC: 12.7 G/DL — LOW (ref 13–17)
MCHC RBC-ENTMCNC: 30.5 PG — SIGNIFICANT CHANGE UP (ref 27–34)
MCHC RBC-ENTMCNC: 32 GM/DL — SIGNIFICANT CHANGE UP (ref 32–36)
MCV RBC AUTO: 95.4 FL — SIGNIFICANT CHANGE UP (ref 80–100)
NRBC # BLD: 0 /100 WBCS — SIGNIFICANT CHANGE UP (ref 0–0)
PLATELET # BLD AUTO: 113 K/UL — LOW (ref 150–400)
POTASSIUM SERPL-MCNC: 4.2 MMOL/L — SIGNIFICANT CHANGE UP (ref 3.5–5.3)
POTASSIUM SERPL-SCNC: 4.2 MMOL/L — SIGNIFICANT CHANGE UP (ref 3.5–5.3)
RBC # BLD: 4.16 M/UL — LOW (ref 4.2–5.8)
RBC # FLD: 14.1 % — SIGNIFICANT CHANGE UP (ref 10.3–14.5)
SODIUM SERPL-SCNC: 145 MMOL/L — SIGNIFICANT CHANGE UP (ref 135–145)
TROPONIN T SERPL-MCNC: 0.01 NG/ML — SIGNIFICANT CHANGE UP (ref 0–0.01)
WBC # BLD: 5.39 K/UL — SIGNIFICANT CHANGE UP (ref 3.8–10.5)
WBC # FLD AUTO: 5.39 K/UL — SIGNIFICANT CHANGE UP (ref 3.8–10.5)

## 2022-08-11 PROCEDURE — 88305 TISSUE EXAM BY PATHOLOGIST: CPT | Mod: 26

## 2022-08-11 PROCEDURE — 52649 PROSTATE LASER ENUCLEATION: CPT

## 2022-08-11 DEVICE — LASER FIBER SOLTIVE 550: Type: IMPLANTABLE DEVICE | Status: FUNCTIONAL

## 2022-08-11 DEVICE — MOCELLATOR ROTATION SINGLEUSE 4.75: Type: IMPLANTABLE DEVICE | Status: FUNCTIONAL

## 2022-08-11 RX ORDER — CIPROFLOXACIN LACTATE 400MG/40ML
500 VIAL (ML) INTRAVENOUS EVERY 12 HOURS
Refills: 0 | Status: DISCONTINUED | OUTPATIENT
Start: 2022-08-11 | End: 2022-08-11

## 2022-08-11 RX ORDER — ACETAMINOPHEN 500 MG
650 TABLET ORAL EVERY 6 HOURS
Refills: 0 | Status: DISCONTINUED | OUTPATIENT
Start: 2022-08-11 | End: 2022-08-14

## 2022-08-11 RX ORDER — INSULIN LISPRO 100/ML
VIAL (ML) SUBCUTANEOUS
Refills: 0 | Status: DISCONTINUED | OUTPATIENT
Start: 2022-08-11 | End: 2022-08-14

## 2022-08-11 RX ORDER — DEXTROSE 50 % IN WATER 50 %
25 SYRINGE (ML) INTRAVENOUS ONCE
Refills: 0 | Status: DISCONTINUED | OUTPATIENT
Start: 2022-08-11 | End: 2022-08-14

## 2022-08-11 RX ORDER — METOPROLOL TARTRATE 50 MG
25 TABLET ORAL DAILY
Refills: 0 | Status: DISCONTINUED | OUTPATIENT
Start: 2022-08-12 | End: 2022-08-14

## 2022-08-11 RX ORDER — SODIUM CHLORIDE 9 MG/ML
1000 INJECTION, SOLUTION INTRAVENOUS
Refills: 0 | Status: DISCONTINUED | OUTPATIENT
Start: 2022-08-11 | End: 2022-08-14

## 2022-08-11 RX ORDER — ATORVASTATIN CALCIUM 80 MG/1
20 TABLET, FILM COATED ORAL AT BEDTIME
Refills: 0 | Status: DISCONTINUED | OUTPATIENT
Start: 2022-08-11 | End: 2022-08-14

## 2022-08-11 RX ORDER — CEPHALEXIN 500 MG
500 CAPSULE ORAL EVERY 12 HOURS
Refills: 0 | Status: COMPLETED | OUTPATIENT
Start: 2022-08-11 | End: 2022-08-13

## 2022-08-11 RX ORDER — BENZOCAINE AND MENTHOL 5; 1 G/100ML; G/100ML
1 LIQUID ORAL
Refills: 0 | Status: DISCONTINUED | OUTPATIENT
Start: 2022-08-11 | End: 2022-08-14

## 2022-08-11 RX ORDER — LISINOPRIL 2.5 MG/1
20 TABLET ORAL DAILY
Refills: 0 | Status: DISCONTINUED | OUTPATIENT
Start: 2022-08-12 | End: 2022-08-14

## 2022-08-11 RX ORDER — DEXTROSE 50 % IN WATER 50 %
15 SYRINGE (ML) INTRAVENOUS ONCE
Refills: 0 | Status: DISCONTINUED | OUTPATIENT
Start: 2022-08-11 | End: 2022-08-14

## 2022-08-11 RX ORDER — SENNA PLUS 8.6 MG/1
2 TABLET ORAL AT BEDTIME
Refills: 0 | Status: DISCONTINUED | OUTPATIENT
Start: 2022-08-11 | End: 2022-08-14

## 2022-08-11 RX ORDER — ATORVASTATIN CALCIUM 80 MG/1
10 TABLET, FILM COATED ORAL AT BEDTIME
Refills: 0 | Status: DISCONTINUED | OUTPATIENT
Start: 2022-08-11 | End: 2022-08-11

## 2022-08-11 RX ORDER — GLUCAGON INJECTION, SOLUTION 0.5 MG/.1ML
1 INJECTION, SOLUTION SUBCUTANEOUS ONCE
Refills: 0 | Status: DISCONTINUED | OUTPATIENT
Start: 2022-08-11 | End: 2022-08-14

## 2022-08-11 RX ORDER — SODIUM CHLORIDE 9 MG/ML
1000 INJECTION, SOLUTION INTRAVENOUS
Refills: 0 | Status: DISCONTINUED | OUTPATIENT
Start: 2022-08-11 | End: 2022-08-12

## 2022-08-11 RX ADMIN — SENNA PLUS 2 TABLET(S): 8.6 TABLET ORAL at 22:27

## 2022-08-11 RX ADMIN — SODIUM CHLORIDE 84 MILLILITER(S): 9 INJECTION, SOLUTION INTRAVENOUS at 22:28

## 2022-08-11 RX ADMIN — ATORVASTATIN CALCIUM 20 MILLIGRAM(S): 80 TABLET, FILM COATED ORAL at 22:28

## 2022-08-11 NOTE — PROGRESS NOTE ADULT - ASSESSMENT
Since his troponin were negative and EKG was unchanged from pre-op EKG, Dr Lynn the anesthesiologist and we agree that we do not need to consult cardiology at this time. We will continue to monitor him closely as he goes to tele. We will continue him on CBI and if it continues to be clear, we will clamp at 5am on 8/12/22 and do TOV in the AM.

## 2022-08-11 NOTE — BRIEF OPERATIVE NOTE - NSICDXBRIEFPROCEDURE_GEN_ALL_CORE_FT
PROCEDURES:  Transurethral enucleation of prostate using thulium laser 11-Aug-2022 17:57:38  Bruce Patel

## 2022-08-11 NOTE — PROGRESS NOTE ADULT - SUBJECTIVE AND OBJECTIVE BOX
Pt was resting comfortably in bed. He states that he has some on and off "chest heaviness" but denies any SOB or shortness of breath. When asked how he felt, he said he felt fine and was not in any pain. He denies of any other complaints.       ICU Vital Signs Last 24 Hrs  T(C): 36.2 (11 Aug 2022 17:33), Max: 36.2 (11 Aug 2022 17:33)  T(F): 97.2 (11 Aug 2022 17:33), Max: 97.2 (11 Aug 2022 17:33)  HR: 52 (11 Aug 2022 18:30) (52 - 61)  BP: 108/60 (11 Aug 2022 18:30) (108/60 - 119/61)  BP(mean): 78 (11 Aug 2022 18:30) (78 - 84)  ABP: --  ABP(mean): --  RR: 18 (11 Aug 2022 18:30) (15 - 20)  SpO2: 100% (11 Aug 2022 18:30) (92% - 100%)    O2 Parameters below as of 11 Aug 2022 18:30  Patient On (Oxygen Delivery Method): nasal cannula        Troponin T, Serum (08.11.22 @ 18:13)    Troponin T, Serum: 0.01: Reference interval for troponin T is </= 0.01 ng/mL which includes the  99th percentile of a healthy population. Troponin T results are not  interchangeable with troponin I results. ng/mL    EKG: Seen and reviewed with Anesthesiologist Dr. Lynn. It showed some ST changes in V1 and A-fib but these were also present in his pre-op EKG.      Urine on CBI was clear

## 2022-08-11 NOTE — PROGRESS NOTE ADULT - SUBJECTIVE AND OBJECTIVE BOX
POST OP NOTE:  procedure: prostate enucleation   Patient denies any new complaints. States he feels he had a bad reaction to anesthesia. Currently denies any chest tightness, heaviness, SOB, fevers, chills, nausea or vomiting.     08-11-22 @ 07:01  -  08-11-22 @ 21:53  --------------------------------------------------------  IN: 3252 mL / OUT: 3500 mL / NET: -248 mL      T(C): 35.3 (08-11-22 @ 21:47), Max: 36.2 (08-11-22 @ 17:33)  HR: 58 (08-11-22 @ 21:00) (52 - 61)  BP: 96/54 (08-11-22 @ 21:00) (94/51 - 119/61)  RR: 15 (08-11-22 @ 21:00) (15 - 28)  SpO2: 99% (08-11-22 @ 21:00) (77% - 100%)    GEN: No acute distress, talking and mentating well   ABD: Soft, ND, NT  : 22fr 3 way joya in place with CBI running slow drip, urine clear

## 2022-08-11 NOTE — PROGRESS NOTE ADULT - PROBLEM SELECTOR PLAN 1
- s/p prostate enucleation POD#0  - continue joya   - continue CBI and titrate as needed   - abx: Keflex based on preop culture   - diet: consistent carbs   - ISS   - SCDs/OOB

## 2022-08-12 ENCOUNTER — TRANSCRIPTION ENCOUNTER (OUTPATIENT)
Age: 72
End: 2022-08-12

## 2022-08-12 LAB
A1C WITH ESTIMATED AVERAGE GLUCOSE RESULT: 5.2 % — SIGNIFICANT CHANGE UP (ref 4–5.6)
ANION GAP SERPL CALC-SCNC: 2 MMOL/L — LOW (ref 5–17)
BASOPHILS # BLD AUTO: 0.02 K/UL — SIGNIFICANT CHANGE UP (ref 0–0.2)
BASOPHILS NFR BLD AUTO: 0.3 % — SIGNIFICANT CHANGE UP (ref 0–2)
BUN SERPL-MCNC: 13 MG/DL — SIGNIFICANT CHANGE UP (ref 7–23)
CALCIUM SERPL-MCNC: 7.7 MG/DL — LOW (ref 8.4–10.5)
CHLORIDE SERPL-SCNC: 111 MMOL/L — HIGH (ref 96–108)
CO2 SERPL-SCNC: 28 MMOL/L — SIGNIFICANT CHANGE UP (ref 22–31)
CREAT SERPL-MCNC: 1.09 MG/DL — SIGNIFICANT CHANGE UP (ref 0.5–1.3)
CULTURE RESULTS: SIGNIFICANT CHANGE UP
EGFR: 73 ML/MIN/1.73M2 — SIGNIFICANT CHANGE UP
EOSINOPHIL # BLD AUTO: 0.18 K/UL — SIGNIFICANT CHANGE UP (ref 0–0.5)
EOSINOPHIL NFR BLD AUTO: 2.7 % — SIGNIFICANT CHANGE UP (ref 0–6)
ESTIMATED AVERAGE GLUCOSE: 103 MG/DL — SIGNIFICANT CHANGE UP (ref 68–114)
GLUCOSE BLDC GLUCOMTR-MCNC: 101 MG/DL — HIGH (ref 70–99)
GLUCOSE BLDC GLUCOMTR-MCNC: 127 MG/DL — HIGH (ref 70–99)
GLUCOSE BLDC GLUCOMTR-MCNC: 129 MG/DL — HIGH (ref 70–99)
GLUCOSE BLDC GLUCOMTR-MCNC: 94 MG/DL — SIGNIFICANT CHANGE UP (ref 70–99)
GLUCOSE SERPL-MCNC: 118 MG/DL — HIGH (ref 70–99)
HCT VFR BLD CALC: 42.9 % — SIGNIFICANT CHANGE UP (ref 39–50)
HGB BLD-MCNC: 13.7 G/DL — SIGNIFICANT CHANGE UP (ref 13–17)
IMM GRANULOCYTES NFR BLD AUTO: 0.5 % — SIGNIFICANT CHANGE UP (ref 0–1.5)
LYMPHOCYTES # BLD AUTO: 0.95 K/UL — LOW (ref 1–3.3)
LYMPHOCYTES # BLD AUTO: 14.4 % — SIGNIFICANT CHANGE UP (ref 13–44)
MCHC RBC-ENTMCNC: 31 PG — SIGNIFICANT CHANGE UP (ref 27–34)
MCHC RBC-ENTMCNC: 31.9 GM/DL — LOW (ref 32–36)
MCV RBC AUTO: 97.1 FL — SIGNIFICANT CHANGE UP (ref 80–100)
MONOCYTES # BLD AUTO: 0.56 K/UL — SIGNIFICANT CHANGE UP (ref 0–0.9)
MONOCYTES NFR BLD AUTO: 8.5 % — SIGNIFICANT CHANGE UP (ref 2–14)
NEUTROPHILS # BLD AUTO: 4.86 K/UL — SIGNIFICANT CHANGE UP (ref 1.8–7.4)
NEUTROPHILS NFR BLD AUTO: 73.6 % — SIGNIFICANT CHANGE UP (ref 43–77)
NRBC # BLD: 0 /100 WBCS — SIGNIFICANT CHANGE UP (ref 0–0)
PLATELET # BLD AUTO: 115 K/UL — LOW (ref 150–400)
POTASSIUM SERPL-MCNC: 4.1 MMOL/L — SIGNIFICANT CHANGE UP (ref 3.5–5.3)
POTASSIUM SERPL-SCNC: 4.1 MMOL/L — SIGNIFICANT CHANGE UP (ref 3.5–5.3)
RBC # BLD: 4.42 M/UL — SIGNIFICANT CHANGE UP (ref 4.2–5.8)
RBC # FLD: 14.5 % — SIGNIFICANT CHANGE UP (ref 10.3–14.5)
SARS-COV-2 N GENE NPH QL NAA+PROBE: NOT DETECTED
SODIUM SERPL-SCNC: 141 MMOL/L — SIGNIFICANT CHANGE UP (ref 135–145)
SPECIMEN SOURCE: SIGNIFICANT CHANGE UP
WBC # BLD: 6.6 K/UL — SIGNIFICANT CHANGE UP (ref 3.8–10.5)
WBC # FLD AUTO: 6.6 K/UL — SIGNIFICANT CHANGE UP (ref 3.8–10.5)

## 2022-08-12 PROCEDURE — 99231 SBSQ HOSP IP/OBS SF/LOW 25: CPT

## 2022-08-12 RX ORDER — TAMSULOSIN HYDROCHLORIDE 0.4 MG/1
0.4 CAPSULE ORAL ONCE
Refills: 0 | Status: COMPLETED | OUTPATIENT
Start: 2022-08-12 | End: 2022-08-12

## 2022-08-12 RX ORDER — ACETAMINOPHEN 500 MG
1000 TABLET ORAL ONCE
Refills: 0 | Status: COMPLETED | OUTPATIENT
Start: 2022-08-12 | End: 2022-08-12

## 2022-08-12 RX ORDER — SODIUM CHLORIDE 9 MG/ML
10 INJECTION INTRAMUSCULAR; INTRAVENOUS; SUBCUTANEOUS ONCE
Refills: 0 | Status: COMPLETED | OUTPATIENT
Start: 2022-08-12 | End: 2022-08-12

## 2022-08-12 RX ORDER — LIDOCAINE 4 G/100G
1 CREAM TOPICAL
Refills: 0 | Status: DISCONTINUED | OUTPATIENT
Start: 2022-08-12 | End: 2022-08-14

## 2022-08-12 RX ORDER — MULTIVIT WITH MIN/MFOLATE/K2 340-15/3 G
1 POWDER (GRAM) ORAL ONCE
Refills: 0 | Status: DISCONTINUED | OUTPATIENT
Start: 2022-08-12 | End: 2022-08-12

## 2022-08-12 RX ORDER — OXYBUTYNIN CHLORIDE 5 MG
5 TABLET ORAL ONCE
Refills: 0 | Status: COMPLETED | OUTPATIENT
Start: 2022-08-12 | End: 2022-08-12

## 2022-08-12 RX ORDER — HYDROMORPHONE HYDROCHLORIDE 2 MG/ML
0.5 INJECTION INTRAMUSCULAR; INTRAVENOUS; SUBCUTANEOUS ONCE
Refills: 0 | Status: DISCONTINUED | OUTPATIENT
Start: 2022-08-12 | End: 2022-08-12

## 2022-08-12 RX ORDER — LIDOCAINE HCL 20 MG/ML
10 VIAL (ML) INJECTION ONCE
Refills: 0 | Status: COMPLETED | OUTPATIENT
Start: 2022-08-12 | End: 2022-08-12

## 2022-08-12 RX ORDER — POLYETHYLENE GLYCOL 3350 17 G/17G
17 POWDER, FOR SOLUTION ORAL ONCE
Refills: 0 | Status: COMPLETED | OUTPATIENT
Start: 2022-08-12 | End: 2022-08-12

## 2022-08-12 RX ORDER — PHENAZOPYRIDINE HCL 100 MG
200 TABLET ORAL EVERY 8 HOURS
Refills: 0 | Status: DISCONTINUED | OUTPATIENT
Start: 2022-08-12 | End: 2022-08-14

## 2022-08-12 RX ORDER — LIDOCAINE 4 G/100G
1 CREAM TOPICAL
Refills: 0 | Status: DISCONTINUED | OUTPATIENT
Start: 2022-08-12 | End: 2022-08-12

## 2022-08-12 RX ADMIN — Medication 650 MILLIGRAM(S): at 21:48

## 2022-08-12 RX ADMIN — ATORVASTATIN CALCIUM 20 MILLIGRAM(S): 80 TABLET, FILM COATED ORAL at 21:48

## 2022-08-12 RX ADMIN — LIDOCAINE 1 APPLICATION(S): 4 CREAM TOPICAL at 05:42

## 2022-08-12 RX ADMIN — Medication 10 MILLILITER(S): at 17:53

## 2022-08-12 RX ADMIN — Medication 650 MILLIGRAM(S): at 22:30

## 2022-08-12 RX ADMIN — SODIUM CHLORIDE 10 MILLILITER(S): 9 INJECTION INTRAMUSCULAR; INTRAVENOUS; SUBCUTANEOUS at 10:58

## 2022-08-12 RX ADMIN — Medication 5 MILLIGRAM(S): at 03:14

## 2022-08-12 RX ADMIN — SENNA PLUS 2 TABLET(S): 8.6 TABLET ORAL at 21:47

## 2022-08-12 RX ADMIN — POLYETHYLENE GLYCOL 3350 17 GRAM(S): 17 POWDER, FOR SOLUTION ORAL at 13:24

## 2022-08-12 RX ADMIN — Medication 200 MILLIGRAM(S): at 13:23

## 2022-08-12 RX ADMIN — LIDOCAINE 1 APPLICATION(S): 4 CREAM TOPICAL at 13:24

## 2022-08-12 RX ADMIN — Medication 400 MILLIGRAM(S): at 05:41

## 2022-08-12 RX ADMIN — Medication 500 MILLIGRAM(S): at 16:48

## 2022-08-12 RX ADMIN — LIDOCAINE 1 APPLICATION(S): 4 CREAM TOPICAL at 18:39

## 2022-08-12 RX ADMIN — Medication 5 MILLIGRAM(S): at 16:48

## 2022-08-12 RX ADMIN — HYDROMORPHONE HYDROCHLORIDE 0.5 MILLIGRAM(S): 2 INJECTION INTRAMUSCULAR; INTRAVENOUS; SUBCUTANEOUS at 22:15

## 2022-08-12 RX ADMIN — Medication 25 MILLIGRAM(S): at 05:42

## 2022-08-12 RX ADMIN — Medication 200 MILLIGRAM(S): at 21:47

## 2022-08-12 RX ADMIN — HYDROMORPHONE HYDROCHLORIDE 0.5 MILLIGRAM(S): 2 INJECTION INTRAMUSCULAR; INTRAVENOUS; SUBCUTANEOUS at 21:47

## 2022-08-12 RX ADMIN — Medication 1000 MILLIGRAM(S): at 06:28

## 2022-08-12 RX ADMIN — Medication 500 MILLIGRAM(S): at 05:41

## 2022-08-12 NOTE — PROGRESS NOTE ADULT - SUBJECTIVE AND OBJECTIVE BOX
INTERVAL HPI/OVERNIGHT EVENTS:  Patient seen and evaluated multiple times overnight. RN alerted for non-sustained PVCs while asleep. Patient denies any new chest pain, tightness to RN. When seen patient denied any chest pain. Admits to feeling of pulling on catheter. Patient repositioned and ditropan given with improvement of "pulling." Patient tolerating sips and food. Incentive spirometer given.     VITALS:    T(F): 98.1 (08-12-22 @ 04:43), Max: 98.1 (08-12-22 @ 04:43)  HR: 50 (08-12-22 @ 03:52) (50 - 61)  BP: 148/65 (08-12-22 @ 03:52) (94/51 - 148/65)  RR: 17 (08-12-22 @ 00:35) (15 - 28)  SpO2: 92% (08-12-22 @ 03:52) (77% - 100%)  Wt(kg): --    I&O's Detail    11 Aug 2022 07:01  -  12 Aug 2022 05:10  --------------------------------------------------------  IN:    Continuous Bladder Irrigation (mL): 7000 mL    Lactated Ringers: 924 mL  Total IN: 7924 mL    OUT:    Continuous Bladder Irrigation (mL): 9075 mL  Total OUT: 9075 mL    Total NET: -1151 mL          MEDICATIONS:    ANTIBIOTICS:  cephalexin 500 milliGRAM(s) Oral every 12 hours      PAIN CONTROL:  acetaminophen     Tablet .. 650 milliGRAM(s) Oral every 6 hours PRN       MEDS:      HEME/ONC        PHYSICAL EXAM:  General: No acute distress.  Alert and Oriented  Abdominal Exam: soft, non-tender, non-distended    Exam: 22fr 3 way in place, CBI on draining light pink urine       LABS:                        12.7   5.39  )-----------( 113      ( 11 Aug 2022 18:13 )             39.7     08-11    145  |  115<H>  |  12  ----------------------------<  110<H>  4.2   |  24  |  0.76    Ca    7.1<L>      11 Aug 2022 18:13

## 2022-08-12 NOTE — PROVIDER CONTACT NOTE (OTHER) - ACTION/TREATMENT ORDERED:
JOI Mera advised he will place an order for pain medication.
JOI Painter advised she will come to bedside and advised for the pt to ambulate to stimulate urination process
JOI Silva advised she will order pain medication for the patient and come to bedside to asses the patient
MD Riley advised he will consult with MD Mcgowan abut a Seymour placement. Advised for the pt to continue to ambulate until a decision is finalized.
Continue to monitor.

## 2022-08-12 NOTE — DISCHARGE NOTE PROVIDER - CARE PROVIDER_API CALL
Dorene Peoples)  Urology  [address] [place] [address]  Phone: [phone]  Fax: [phone]  Follow Up Time:

## 2022-08-12 NOTE — PROGRESS NOTE ADULT - SUBJECTIVE AND OBJECTIVE BOX
Patient reporting worsening lower back pain radiating to buttocks/thighs, 10/10, sharp.  Also with tingling in bilateral feet.    Seen and examined with  resident.  Back exam with mild tenderness to palpation over lumbar spine, no swelling, ecchymosis or hematoma noted. Grossly neuro exam is intact.  No pronator drift.  5/5 upper/lower motor strenght.  Ambulating with steady gate.  Has known peripheral neuropathy, however back pain with radiation to thigh is new.    Discussed with anesthesia.  Dr Melvin will come to evaluate patient and provide recs. Patient reporting worsening lower back pain radiating to buttocks/thighs, 10/10, sharp.  Also with tingling in bilateral feet.  Declines pain meds.    Seen and examined with  resident.  Back exam with mild tenderness to palpation over lumbar spine, no swelling, ecchymosis or hematoma noted. Grossly neuro exam is intact.  No pronator drift.  5/5 upper/lower motor strenght.  Ambulating with steady gate.  Has known peripheral neuropathy, however back pain with radiation to thigh is new.    Discussed with anesthesia.  Dr Melvin will come to evaluate patient and provide recs. Patient reporting worsening lower back pain radiating to buttocks/thighs, 10/10, sharp.  Also with tingling in bilateral feet.  Declines pain meds.    Seen and examined with  resident.  Back exam with mild tenderness to palpation over lumbar spine, no swelling, ecchymosis or hematoma noted. Grossly neuro exam is intact.  No pronator drift.  5/5 upper/lower motor strength.  Ambulating with steady gate.  Has known peripheral neuropathy, however back pain with radiation to thigh is new.    Discussed with anesthesia.  Dr Melvin will come to evaluate patient and provide recs. Patient reporting worsening lower back pain radiating to buttocks/thighs, 10/10, sharp.  Also with tingling in bilateral feet.  Declines pain meds.    Seen and examined with  resident.  Back exam with mild tenderness to palpation over lumbar spine, no swelling, ecchymosis or hematoma noted. Grossly neuro exam is intact.  No pronator drift.  5/5 upper/lower motor strength.  Ambulating with steady gate.  Has known peripheral neuropathy, however back pain with radiation to thigh is new.    Discussed with anesthesia.  Dr Melvin will come to evaluate patient and provide recs.       Pager

## 2022-08-12 NOTE — CONSULT NOTE ADULT - ASSESSMENT
71 years old male is being evaluated for intense low back pain following prostate surgery. On exam tenderness over the lower back, neuro exam: intact strength and sensation, loss of vibration and wide based gait consistent with neuropathy possibly diabetic. Although not common, but need to rule out hematoma/ fluid collection in spinal area.   Recommendations:  - May obtain CT lumber spine with contrast to r/o any hematoma or fluid collection.  - Routine neurocheck.  - Adequate pain management.  Case discussed with Neurology attending , will be re-evaluated by team in the morning.

## 2022-08-12 NOTE — DISCHARGE NOTE PROVIDER - NSDCFUSCHEDAPPT_GEN_ALL_CORE_FT
Silvino Leone  [facility]  OP[address]THALM [address]  Scheduled Appointment: 08/18/2022    Kristan Shields  [facility]  OP[address]THALM [address]  Scheduled Appointment: 08/31/2022

## 2022-08-12 NOTE — PROGRESS NOTE ADULT - PROBLEM SELECTOR PLAN 1
- s/p prostate enucleation POD#1  - continue joya   - continue CBI and titrate as needed. possible TOV today  - abx: Keflex based on preop culture   - diet: consistent carbs   - ISS   - SCDs/OOB.

## 2022-08-12 NOTE — DISCHARGE NOTE PROVIDER - NSDCMRMEDTOKEN_GEN_ALL_CORE_FT
aspirin 81 mg oral delayed release tablet: 1 tab(s) orally once a day  atorvastatin 40 mg oral tablet: 1 tab(s) orally once a day  metFORMIN 500 mg oral tablet: 1 tab(s) orally 2 times a day    note: last fill jan #30 days  metoprolol succinate 50 mg oral tablet, extended release: 1 tab(s) orally once a day  oxybutynin 5 mg oral tablet: 1 tab(s) orally 3 times a day   ramipril 10 mg oral capsule: 1 cap(s) orally once a day  rivaroxaban 20 mg oral tablet: 1 tab(s) orally once a day  tamsulosin 0.4 mg oral capsule: 2 cap(s) orally once a day (at bedtime)   aspirin 81 mg oral delayed release tablet: 1 tab(s) orally once a day  atorvastatin 40 mg oral tablet: 1 tab(s) orally once a day  gabapentin 100 mg oral capsule: 1 cap(s) orally 3 times a day -for moderate pain   metFORMIN 500 mg oral tablet: 1 tab(s) orally 2 times a day    note: last fill jan #30 days  metoprolol succinate 50 mg oral tablet, extended release: 1 tab(s) orally once a day  oxybutynin 5 mg oral tablet: 1 tab(s) orally 3 times a day   ramipril 10 mg oral capsule: 1 cap(s) orally once a day  rivaroxaban 20 mg oral tablet: 1 tab(s) orally once a day  tamsulosin 0.4 mg oral capsule: 2 cap(s) orally once a day (at bedtime)

## 2022-08-12 NOTE — DISCHARGE NOTE PROVIDER - NSDCFUADDINST_GEN_ALL_CORE_FT
Advance diet as tolerated, activity as tolerated.  Stay well hydrated. If fever >100.4 or any change or worsening of symptoms please call doctor or report to ED.    Blood in your urine. It's normal to see blood right after surgery. Urination might be painful, or you might have a sense of urgency or frequent need to urinate. This is normal.   Advance diet as tolerated, activity as tolerated.  Stay well hydrated. If any fever > 101, chills, severe nausea/vomiting or urinary retention go the ER    Activity: Take is easy until you speak with your doctor. Do not engage in activities requiring heavy lifting (greater  than 10-20 lbs.)    Blood in the Urine (Hematuria): You may experience blood intermittently in the urine with or without small clots. This is to be expected. If you have large clots/worsening hematuria or unable to urinate go to the ER    Bowel Movements: It is important to keep your bowels regular during the postoperative period.  If you need take Miralax OTC  When to Seek Medical Attention:  • Bright red bleeding in urine with heavy clots  • Chills or fever over 38 degrees C (101 degrees F)  • Inability to urinate for more than 4 hours if you do not have a catheter  • Feeling of bladder fullness that does not go away after urinating  • Severe pain that is not relieved by pain medication  • No urine draining from the catheter if you have one    Continue to monitor your lower back pain or signs of neurologic changes, if worsening go to the ER

## 2022-08-12 NOTE — PROVIDER CONTACT NOTE (OTHER) - REASON
Pain 10/10, back pain present
Pt. bradying on the monitor and had episode of PVCs.
Pt unable to void, urge present, BS 846cc
Pain 7/10
Pt urinated 250, RBV 954mls

## 2022-08-12 NOTE — PROVIDER CONTACT NOTE (OTHER) - RECOMMENDATIONS
Dilaudid 0.5mg-1mg
straight cath pt, have team come to bedside for assessment
CT scan of the abdomen, Dilaudid for pain, and for the team to come to bedside to assess the patient.
Continue to monitor.
Seymour insertion

## 2022-08-12 NOTE — PROVIDER CONTACT NOTE (OTHER) - ASSESSMENT
Pt was in a seated position. Pt stated he feels constant sharp pain going down his legs and feels it might be from the epidural.
BS 846ccs, pt denies pain or tenderness when palpation suprapubic area. Pt advises he feels the urge to urinate.
Pt. had episode of PVCs on the monitor, non-sustaining (no V tach). Pt. found to be bradying while asleep. Pt. sleeping peacefully, easily arousable. Pt. denies CP and SOB. On 2L NC to promote oxygenation >90%. Pt. normotensive.
Pt advised he feels the urge to urinate but is having trouble urinating anymore. VSS
Pt was laying back in the recliner. Pt states his back hurts and "something dosent feel right" VSS Good Seymour output seen.

## 2022-08-12 NOTE — DISCHARGE NOTE PROVIDER - HOSPITAL COURSE
2yo male with PMH of CAD, cardiac stents 2019, HTN, HLD s/p prostate enucleation 8/11. Pt is hemodynamically stable and optimized for discharge. 70yo male with PMH of CAD, cardiac stents 2019, HTN, HLD s/p prostate enucleation 8/11. He was evaluated by Anesthesia postop for severe lower back pain and thigh numbness and Neurology. He had CT lumbar/thoracic performed. Pt is hemodynamically stable and optimized for discharge.

## 2022-08-12 NOTE — DISCHARGE NOTE PROVIDER - NSDCCPCAREPLAN_GEN_ALL_CORE_FT
PRINCIPAL DISCHARGE DIAGNOSIS  Diagnosis: BPH with obstruction/lower urinary tract symptoms  Assessment and Plan of Treatment:       SECONDARY DISCHARGE DIAGNOSES  Diagnosis: Chronic atrial fibrillation  Assessment and Plan of Treatment:     Diagnosis: CAD (coronary artery disease)  Assessment and Plan of Treatment:

## 2022-08-12 NOTE — PROVIDER CONTACT NOTE (OTHER) - SITUATION
Pt unable to void, urge present, BS 846cc
Pain 10/10, back pain present
Pt urinated 250, RBV 954mls
Pt. bradying on the monitor and had episode of PVCs.
Pain 7/10

## 2022-08-12 NOTE — ANESTHESIA FOLLOW-UP NOTE - NSRECOMMENDFT_GEN_ALL_CORE
D/W patient and Urology service to obtain Neurology consult to recommend any further treatment and/or imaging studies.

## 2022-08-12 NOTE — CONSULT NOTE ADULT - SUBJECTIVE AND OBJECTIVE BOX
HPI:  71 years old male with PMH of HTN, HLD, CAD s/p stent 2019, Afib on Xarelto, H/O Rt Occipital 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 Patient underwent surgery on 08/11 evening. Neurology is being consulted for low back pain. The pain started following receiving spinal anesthesia. It started in lower back radiates to both thighs, Denies any saddle anesthesia, has a joya in place for CBI. He used to have back pain in the past, but never this severe. Reports mild headache, denies any dizziness, blurry vision, weakness or numbness.   Chart review reveals, patient was admitted in [place] with right leg heaviness , found to have Rt Occipital infarct ( doesn't correlate, incidental?), started on Xarelto which has been held from last Tuesday for the surgery.   MRI of L/S spine from 03/22: L4-5: Disc bulge. Left subarticular annular fissure is suggested. Partial effacement of the left greater than right lateral recess. No spinal canal stenosis. No right and mild left neuroforaminal stenosis.        PAST MEDICAL & SURGICAL HISTORY:  HTN (hypertension)  HLD (hyperlipidemia)  Diabetes 1.5, managed as type 2  PVD (peripheral vascular disease)  Atrial fibrillation  CAD (coronary artery disease)  No significant past surgical history      Medications:  acetaminophen     Tablet .. 650 milliGRAM(s) Oral every 6 hours PRN  atorvastatin 20 milliGRAM(s) Oral at bedtime  benzocaine 15 mG/menthol 3.6 mG Lozenge 1 Lozenge Oral two times a day PRN  cephalexin 500 milliGRAM(s) Oral every 12 hours  dextrose 5%. 1000 milliLiter(s) IV Continuous <Continuous>  dextrose 50% Injectable 25 Gram(s) IV Push once  dextrose Oral Gel 15 Gram(s) Oral once PRN  glucagon  Injectable 1 milliGRAM(s) IntraMuscular once  insulin lispro (ADMELOG) corrective regimen sliding scale   SubCutaneous Before meals and at bedtime  lidocaine 4% Cream 1 Application(s) Topical four times a day  lisinopril 20 milliGRAM(s) Oral daily  metoprolol succinate ER 25 milliGRAM(s) Oral daily  phenazopyridine 200 milliGRAM(s) Oral every 8 hours  senna 2 Tablet(s) Oral at bedtime      Vital Signs Last 24 Hrs  T(C): 36.7 (12 Aug 2022 22:28), Max: 37.1 (12 Aug 2022 13:08)  T(F): 98.1 (12 Aug 2022 22:28), Max: 98.8 (12 Aug 2022 13:08)  HR: 66 (12 Aug 2022 15:42) (50 - 66)  BP: 110/55 (12 Aug 2022 15:42) (106/52 - 148/65)  BP(mean): 77 (12 Aug 2022 15:42) (75 - 94)  RR: 18 (12 Aug 2022 15:42) (16 - 18)  SpO2: 98% (12 Aug 2022 15:42) (90% - 98%)    Parameters below as of 12 Aug 2022 15:42  Patient On (Oxygen Delivery Method): room air        Neurological Exam:   General survey: amputated left 2nd ,3rd 4th toes, trophic chnages of skin noted.   Mental status: Awake, alert and oriented x3. Naming, repetition and comprehension intact.  Attention/concentration intact.  No dysarthria, no aphasia.     Cranial nerves: Pupils equally round and reactive to light, visual fields RT eye, blurred vision left eye at baseline due to cataract, no nystagmus, extraocular muscles intact, V1 through V3 intact bilaterally and symmetric, face symmetric, hearing intact to finger rub, palate elevation symmetric, tongue was midline.  Motor:  MRC grading 5/5 b/l UE/LE.   strength 5/5.  Normal tone and bulk.  No abnormal movements.    Sensation: Intact to light touch, proprioception, and pinprick. Vibration: mild to moderate loss RLE>LLE  Coordination: No dysmetria on finger-to-nose.  Reflexes: 2+ in bilateral UE/LE, downgoing toes bilaterally.  Gait: mild wide based gait. able to ambulate independently for 2 steps.  Musculoskeletal: tenderness over lower back L3,4,5 region , no redness or swelling/fullness appreciated.     Labs:  CBC Full  -  ( 12 Aug 2022 06:51 )  WBC Count : 6.60 K/uL  RBC Count : 4.42 M/uL  Hemoglobin : 13.7 g/dL  Hematocrit : 42.9 %  Platelet Count - Automated : 115 K/uL  Mean Cell Volume : 97.1 fl  Mean Cell Hemoglobin : 31.0 pg  Mean Cell Hemoglobin Concentration : 31.9 gm/dL  Auto Neutrophil # : 4.86 K/uL  Auto Lymphocyte # : 0.95 K/uL  Auto Monocyte # : 0.56 K/uL  Auto Eosinophil # : 0.18 K/uL  Auto Basophil # : 0.02 K/uL  Auto Neutrophil % : 73.6 %  Auto Lymphocyte % : 14.4 %  Auto Monocyte % : 8.5 %  Auto Eosinophil % : 2.7 %  Auto Basophil % : 0.3 %    08-12    141  |  111<H>  |  13  ----------------------------<  118<H>  4.1   |  28  |  1.09    Ca    7.7<L>      12 Aug 2022 06:51       HPI:  71 years old male with PMH of HTN, HLD, CAD s/p stent 2019, Afib on Xarelto, H/O Rt Occipital 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 Patient underwent surgery on 08/11 evening. Neurology is being consulted for low back pain. The pain started following receiving spinal anesthesia. It started in lower back radiates to both thighs, Denies any saddle anesthesia, has a joya in place for CBI. He used to have back pain in the past, but never this severe. Reports mild headache, denies any dizziness, blurry vision, weakness or numbness.   Chart review reveals, patient was admitted in [place] with right leg heaviness , found to have Rt Occipital infarct ( doesn't correlate, incidental?), started on Xarelto which has been held from last Tuesday for the surgery.   MRI of L/S spine from 03/22: L4-5: Disc bulge. Left subarticular annular fissure is suggested. Partial effacement of the left greater than right lateral recess. No spinal canal stenosis. No right and mild left neuroforaminal stenosis.        PAST MEDICAL & SURGICAL HISTORY:  HTN (hypertension)  HLD (hyperlipidemia)  Diabetes 1.5, managed as type 2  PVD (peripheral vascular disease)  Atrial fibrillation  CAD (coronary artery disease)  No significant past surgical history      Medications:  acetaminophen     Tablet .. 650 milliGRAM(s) Oral every 6 hours PRN  atorvastatin 20 milliGRAM(s) Oral at bedtime  benzocaine 15 mG/menthol 3.6 mG Lozenge 1 Lozenge Oral two times a day PRN  cephalexin 500 milliGRAM(s) Oral every 12 hours  dextrose 5%. 1000 milliLiter(s) IV Continuous <Continuous>  dextrose 50% Injectable 25 Gram(s) IV Push once  dextrose Oral Gel 15 Gram(s) Oral once PRN  glucagon  Injectable 1 milliGRAM(s) IntraMuscular once  insulin lispro (ADMELOG) corrective regimen sliding scale   SubCutaneous Before meals and at bedtime  lidocaine 4% Cream 1 Application(s) Topical four times a day  lisinopril 20 milliGRAM(s) Oral daily  metoprolol succinate ER 25 milliGRAM(s) Oral daily  phenazopyridine 200 milliGRAM(s) Oral every 8 hours  senna 2 Tablet(s) Oral at bedtime      Vital Signs Last 24 Hrs  T(C): 36.7 (12 Aug 2022 22:28), Max: 37.1 (12 Aug 2022 13:08)  T(F): 98.1 (12 Aug 2022 22:28), Max: 98.8 (12 Aug 2022 13:08)  HR: 66 (12 Aug 2022 15:42) (50 - 66)  BP: 110/55 (12 Aug 2022 15:42) (106/52 - 148/65)  BP(mean): 77 (12 Aug 2022 15:42) (75 - 94)  RR: 18 (12 Aug 2022 15:42) (16 - 18)  SpO2: 98% (12 Aug 2022 15:42) (90% - 98%)    Parameters below as of 12 Aug 2022 15:42  Patient On (Oxygen Delivery Method): room air        Neurological Exam:   General survey: amputated left 2nd ,3rd 4th toes, trophic chnages of skin noted.   Mental status: Awake, alert and oriented x3. Naming, repetition and comprehension intact.  Attention/concentration intact.  No dysarthria, no aphasia.     Cranial nerves: Pupils equally round and reactive to light, visual fields RT eye, blurred vision left eye at baseline due to cataract, no nystagmus, extraocular muscles intact, V1 through V3 intact bilaterally and symmetric, face symmetric, hearing intact to finger rub, palate elevation symmetric, tongue was midline.  Motor:  MRC grading 5/5 b/l UE/LE.   strength 5/5.  Normal tone and bulk.  No abnormal movements.    Sensation: Intact to light touch, proprioception, and pinprick. Vibration: mild to moderate loss RLE>LLE  Coordination: No dysmetria on finger-to-nose.  Reflexes: 2+ in bilateral UE, 2+ B/L patellar, 1+ B/L ankle, downgoing toes bilaterally.  Gait: mild wide based gait. able to ambulate independently for 2 steps.  Musculoskeletal: tenderness over lower back L3,4,5 region , no redness or swelling/fullness appreciated.     Labs:  CBC Full  -  ( 12 Aug 2022 06:51 )  WBC Count : 6.60 K/uL  RBC Count : 4.42 M/uL  Hemoglobin : 13.7 g/dL  Hematocrit : 42.9 %  Platelet Count - Automated : 115 K/uL  Mean Cell Volume : 97.1 fl  Mean Cell Hemoglobin : 31.0 pg  Mean Cell Hemoglobin Concentration : 31.9 gm/dL  Auto Neutrophil # : 4.86 K/uL  Auto Lymphocyte # : 0.95 K/uL  Auto Monocyte # : 0.56 K/uL  Auto Eosinophil # : 0.18 K/uL  Auto Basophil # : 0.02 K/uL  Auto Neutrophil % : 73.6 %  Auto Lymphocyte % : 14.4 %  Auto Monocyte % : 8.5 %  Auto Eosinophil % : 2.7 %  Auto Basophil % : 0.3 %    08-12    141  |  111<H>  |  13  ----------------------------<  118<H>  4.1   |  28  |  1.09    Ca    7.7<L>      12 Aug 2022 06:51

## 2022-08-12 NOTE — PROVIDER CONTACT NOTE (OTHER) - BACKGROUND
Pt. s/p enucleation of prostate w/ 22 Fr [place] placement for CBI 8/11/22.
s/p surgery and retention
s/p procudure
s/p surgery
s/p procedure,

## 2022-08-12 NOTE — ANESTHESIA FOLLOW-UP NOTE - NSEVALATIONFT_GEN_ALL_CORE
S/P laser enucleation of prostate under spinal anesthesia POD 1. Patient c/o excruciating back pain in the lumbar region radiating to B/L upper thigh with associated "pins and needles" and "feeling of tightness". Patient states he has no previous h/o of back pain and that the pain occurred after the spinal wore off and has been increasing in intensity.  Physical exam revealed tenderness to lumbar area with no signs of ecchymosis or swelling in the area.   Patient with full motor and sensory function intact.

## 2022-08-13 DIAGNOSIS — M54.9 DORSALGIA, UNSPECIFIED: ICD-10-CM

## 2022-08-13 LAB
ANION GAP SERPL CALC-SCNC: 6 MMOL/L — SIGNIFICANT CHANGE UP (ref 5–17)
BUN SERPL-MCNC: 19 MG/DL — SIGNIFICANT CHANGE UP (ref 7–23)
CALCIUM SERPL-MCNC: 8.5 MG/DL — SIGNIFICANT CHANGE UP (ref 8.4–10.5)
CHLORIDE SERPL-SCNC: 108 MMOL/L — SIGNIFICANT CHANGE UP (ref 96–108)
CO2 SERPL-SCNC: 27 MMOL/L — SIGNIFICANT CHANGE UP (ref 22–31)
CREAT SERPL-MCNC: 0.91 MG/DL — SIGNIFICANT CHANGE UP (ref 0.5–1.3)
EGFR: 91 ML/MIN/1.73M2 — SIGNIFICANT CHANGE UP
GLUCOSE BLDC GLUCOMTR-MCNC: 107 MG/DL — HIGH (ref 70–99)
GLUCOSE BLDC GLUCOMTR-MCNC: 120 MG/DL — HIGH (ref 70–99)
GLUCOSE BLDC GLUCOMTR-MCNC: 148 MG/DL — HIGH (ref 70–99)
GLUCOSE BLDC GLUCOMTR-MCNC: 88 MG/DL — SIGNIFICANT CHANGE UP (ref 70–99)
GLUCOSE SERPL-MCNC: 109 MG/DL — HIGH (ref 70–99)
HCT VFR BLD CALC: 40.3 % — SIGNIFICANT CHANGE UP (ref 39–50)
HGB BLD-MCNC: 13.1 G/DL — SIGNIFICANT CHANGE UP (ref 13–17)
MAGNESIUM SERPL-MCNC: 2 MG/DL — SIGNIFICANT CHANGE UP (ref 1.6–2.6)
MCHC RBC-ENTMCNC: 30.8 PG — SIGNIFICANT CHANGE UP (ref 27–34)
MCHC RBC-ENTMCNC: 32.5 GM/DL — SIGNIFICANT CHANGE UP (ref 32–36)
MCV RBC AUTO: 94.6 FL — SIGNIFICANT CHANGE UP (ref 80–100)
NRBC # BLD: 0 /100 WBCS — SIGNIFICANT CHANGE UP (ref 0–0)
PHOSPHATE SERPL-MCNC: 2.5 MG/DL — SIGNIFICANT CHANGE UP (ref 2.5–4.5)
PLATELET # BLD AUTO: 111 K/UL — LOW (ref 150–400)
POTASSIUM SERPL-MCNC: 4.1 MMOL/L — SIGNIFICANT CHANGE UP (ref 3.5–5.3)
POTASSIUM SERPL-SCNC: 4.1 MMOL/L — SIGNIFICANT CHANGE UP (ref 3.5–5.3)
RBC # BLD: 4.26 M/UL — SIGNIFICANT CHANGE UP (ref 4.2–5.8)
RBC # FLD: 14.6 % — HIGH (ref 10.3–14.5)
SODIUM SERPL-SCNC: 141 MMOL/L — SIGNIFICANT CHANGE UP (ref 135–145)
WBC # BLD: 7.6 K/UL — SIGNIFICANT CHANGE UP (ref 3.8–10.5)
WBC # FLD AUTO: 7.6 K/UL — SIGNIFICANT CHANGE UP (ref 3.8–10.5)

## 2022-08-13 PROCEDURE — 72128 CT CHEST SPINE W/O DYE: CPT | Mod: 26

## 2022-08-13 PROCEDURE — 72158 MRI LUMBAR SPINE W/O & W/DYE: CPT | Mod: 26

## 2022-08-13 PROCEDURE — 72131 CT LUMBAR SPINE W/O DYE: CPT | Mod: 26

## 2022-08-13 PROCEDURE — 99222 1ST HOSP IP/OBS MODERATE 55: CPT

## 2022-08-13 RX ORDER — IBUPROFEN 200 MG
200 TABLET ORAL EVERY 4 HOURS
Refills: 0 | Status: DISCONTINUED | OUTPATIENT
Start: 2022-08-13 | End: 2022-08-13

## 2022-08-13 RX ORDER — LIDOCAINE 4 G/100G
1 CREAM TOPICAL ONCE
Refills: 0 | Status: COMPLETED | OUTPATIENT
Start: 2022-08-13 | End: 2022-08-13

## 2022-08-13 RX ORDER — HYDROMORPHONE HYDROCHLORIDE 2 MG/ML
0.5 INJECTION INTRAMUSCULAR; INTRAVENOUS; SUBCUTANEOUS ONCE
Refills: 0 | Status: DISCONTINUED | OUTPATIENT
Start: 2022-08-13 | End: 2022-08-13

## 2022-08-13 RX ORDER — LIDOCAINE 4 G/100G
1 CREAM TOPICAL
Refills: 0 | Status: DISCONTINUED | OUTPATIENT
Start: 2022-08-13 | End: 2022-08-13

## 2022-08-13 RX ORDER — GABAPENTIN 400 MG/1
100 CAPSULE ORAL THREE TIMES A DAY
Refills: 0 | Status: DISCONTINUED | OUTPATIENT
Start: 2022-08-13 | End: 2022-08-14

## 2022-08-13 RX ORDER — CYCLOBENZAPRINE HYDROCHLORIDE 10 MG/1
5 TABLET, FILM COATED ORAL ONCE
Refills: 0 | Status: COMPLETED | OUTPATIENT
Start: 2022-08-13 | End: 2022-08-13

## 2022-08-13 RX ORDER — TRAMADOL HYDROCHLORIDE 50 MG/1
50 TABLET ORAL EVERY 6 HOURS
Refills: 0 | Status: DISCONTINUED | OUTPATIENT
Start: 2022-08-13 | End: 2022-08-14

## 2022-08-13 RX ORDER — RIVAROXABAN 15 MG-20MG
20 KIT ORAL
Refills: 0 | Status: DISCONTINUED | OUTPATIENT
Start: 2022-08-13 | End: 2022-08-14

## 2022-08-13 RX ADMIN — Medication 650 MILLIGRAM(S): at 18:51

## 2022-08-13 RX ADMIN — LIDOCAINE 1 PATCH: 4 CREAM TOPICAL at 05:23

## 2022-08-13 RX ADMIN — Medication 650 MILLIGRAM(S): at 11:28

## 2022-08-13 RX ADMIN — Medication 500 MILLIGRAM(S): at 18:03

## 2022-08-13 RX ADMIN — LIDOCAINE 1 APPLICATION(S): 4 CREAM TOPICAL at 01:00

## 2022-08-13 RX ADMIN — GABAPENTIN 100 MILLIGRAM(S): 400 CAPSULE ORAL at 13:18

## 2022-08-13 RX ADMIN — LISINOPRIL 20 MILLIGRAM(S): 2.5 TABLET ORAL at 05:24

## 2022-08-13 RX ADMIN — Medication 650 MILLIGRAM(S): at 05:23

## 2022-08-13 RX ADMIN — LIDOCAINE 1 APPLICATION(S): 4 CREAM TOPICAL at 18:03

## 2022-08-13 RX ADMIN — Medication 200 MILLIGRAM(S): at 18:03

## 2022-08-13 RX ADMIN — LIDOCAINE 1 PATCH: 4 CREAM TOPICAL at 07:34

## 2022-08-13 RX ADMIN — Medication 200 MILLIGRAM(S): at 13:39

## 2022-08-13 RX ADMIN — SENNA PLUS 2 TABLET(S): 8.6 TABLET ORAL at 23:21

## 2022-08-13 RX ADMIN — LIDOCAINE 1 APPLICATION(S): 4 CREAM TOPICAL at 05:54

## 2022-08-13 RX ADMIN — Medication 200 MILLIGRAM(S): at 13:19

## 2022-08-13 RX ADMIN — HYDROMORPHONE HYDROCHLORIDE 0.5 MILLIGRAM(S): 2 INJECTION INTRAMUSCULAR; INTRAVENOUS; SUBCUTANEOUS at 06:19

## 2022-08-13 RX ADMIN — Medication 650 MILLIGRAM(S): at 06:30

## 2022-08-13 RX ADMIN — LIDOCAINE 1 APPLICATION(S): 4 CREAM TOPICAL at 11:28

## 2022-08-13 RX ADMIN — ATORVASTATIN CALCIUM 20 MILLIGRAM(S): 80 TABLET, FILM COATED ORAL at 23:22

## 2022-08-13 RX ADMIN — TRAMADOL HYDROCHLORIDE 50 MILLIGRAM(S): 50 TABLET ORAL at 23:21

## 2022-08-13 RX ADMIN — Medication 25 MILLIGRAM(S): at 05:23

## 2022-08-13 RX ADMIN — GABAPENTIN 100 MILLIGRAM(S): 400 CAPSULE ORAL at 23:22

## 2022-08-13 RX ADMIN — Medication 500 MILLIGRAM(S): at 05:46

## 2022-08-13 RX ADMIN — Medication 650 MILLIGRAM(S): at 19:51

## 2022-08-13 RX ADMIN — Medication 200 MILLIGRAM(S): at 05:24

## 2022-08-13 RX ADMIN — Medication 200 MILLIGRAM(S): at 23:33

## 2022-08-13 RX ADMIN — Medication 200 MILLIGRAM(S): at 19:03

## 2022-08-13 NOTE — PROGRESS NOTE ADULT - PROBLEM SELECTOR PLAN 1
-s/p prostate enucleation, failed trial of void 8/12  -joya in place to gravity  -regular diet  -pain control  -IS/SCDs  -DVT ppx  -OOB

## 2022-08-13 NOTE — PROGRESS NOTE ADULT - ASSESSMENT
71 years old male PMH DM /heart history is being evaluated for intense low back pain following prostate surgery. On exam tenderness over the lower back, neuro exam: intact strength and sensation, loss of vibration and wide based gait consistent with neuropathy possibly diabetic.  CT L without any hematoma or fluid collection. With radiating pain downward should obtain MR L with/without contrast to assess for any root signal abnormalities.    Recommendations:  - MR Lumbar with/without contrast  - Start Gabapentin 100 TID for neuropathic pain  - NSAID if appropriate per urology  - Routine neurocheck.  - Adequate pain management.    Case discussed with Neurology attending 70yo male PMH of CAD, cardiac stents 2019, HTN, DM, HLD s/p prostate enucleation with intense crushing and radiating lower back pain following prostate surgery. CT L spine mild spondylosis, no hematoma or fluid abscess. With radiating pain downward should obtain MR L with/without contrast to assess for any root signal abnormalities.    Recommendations:  - MR Lumbar with/without contrast  - Start Gabapentin 100 TID for neuropathic pain  - NSAID if appropriate per urology  - Routine neurocheck  - Adequate pain management.    Case discussed with Neurology attending

## 2022-08-13 NOTE — PROGRESS NOTE ADULT - SUBJECTIVE AND OBJECTIVE BOX
Neurology Progress Note    Interval History/ Subjective: CT L spine mild spondylosis, no hematoma or fluid abscess. Feels well, pain under control with dilaudid.       PAST MEDICAL & SURGICAL HISTORY:  HTN (hypertension)    HLD (hyperlipidemia)    Diabetes 1.5, managed as type 2    PVD (peripheral vascular disease)    Atrial fibrillation    CAD (coronary artery disease)    No significant past surgical history            Medications:  acetaminophen     Tablet .. 650 milliGRAM(s) Oral every 6 hours PRN  atorvastatin 20 milliGRAM(s) Oral at bedtime  benzocaine 15 mG/menthol 3.6 mG Lozenge 1 Lozenge Oral two times a day PRN  cephalexin 500 milliGRAM(s) Oral every 12 hours  dextrose 5%. 1000 milliLiter(s) IV Continuous <Continuous>  dextrose 50% Injectable 25 Gram(s) IV Push once  dextrose Oral Gel 15 Gram(s) Oral once PRN  gabapentin 100 milliGRAM(s) Oral three times a day  glucagon  Injectable 1 milliGRAM(s) IntraMuscular once  ibuprofen  Tablet. 200 milliGRAM(s) Oral every 4 hours  insulin lispro (ADMELOG) corrective regimen sliding scale   SubCutaneous Before meals and at bedtime  lidocaine 4% Cream 1 Application(s) Topical four times a day  lisinopril 20 milliGRAM(s) Oral daily  metoprolol succinate ER 25 milliGRAM(s) Oral daily  phenazopyridine 200 milliGRAM(s) Oral every 8 hours  senna 2 Tablet(s) Oral at bedtime      Vital Signs Last 24 Hrs  T(C): 36.2 (13 Aug 2022 09:00), Max: 37.1 (12 Aug 2022 18:19)  T(F): 97.2 (13 Aug 2022 09:00), Max: 98.7 (12 Aug 2022 18:19)  HR: 60 (13 Aug 2022 11:27) (60 - 70)  BP: 126/63 (13 Aug 2022 11:27) (110/55 - 128/60)  BP(mean): 86 (13 Aug 2022 11:27) (77 - 86)  RR: 18 (13 Aug 2022 11:27) (17 - 18)  SpO2: 97% (13 Aug 2022 11:27) (95% - 98%)    Parameters below as of 13 Aug 2022 11:27  Patient On (Oxygen Delivery Method): room air      Physical exam:  PHYSICAL EXAM:  Constitutional: elderly male NAD  HEENT: PERRL, EOMI, sclera non-icteric, neck supple, trachea midline, no masses, no JVD, MMM, good dentition  Respiratory: CTA b/l, good air entry b/l, no wheezing, no rhonchi, no rales, without accessory muscle use and no intercostal retractions  Cardiovascular: RRR, normal S1S2, no M/R/G  Gastrointestinal: soft, NTND, no masses palpable, BS normal  : Red urine draining from joya  Extremities: Warm, well perfused. Discoloration of b/l shins, charcot L foot with 3 toe amputations  Skin: Normal temperature, warm, dry  Back: No TTP on lower back surgical site  Neurological Exam:   Mental status: AAOx4.  Recent and remote memory intact.  Naming, repetition and comprehension intact.  Attention/concentration intact.  No dysarthria, no aphasia.  Fund of knowledge appropriate.    Cranial nerves: Pupils equally round and reactive to light, visual fields full, no nystagmus, extraocular muscles intact, V1 through V3 intact bilaterally and symmetric ptosis L eye  hearing intact to finger rub, palate elevation symmetric, tongue was midline.  Motor:  MRC grading 5/5 b/l UE/LE.   strength 5/5.  Normal tone and bulk.  No abnormal movements.    Sensation: Intact to light touch, proprioception and cold  Coordination: No dysmetria on finger-to-nose and heel-to-shin.  No dysdiadokinesia.  Reflexes: 2+ in bilateral UE/LE, downgoing toes bilaterally. (-) Tabor.  Gait: Narrow and steady. No ataxia.     Labs:  CBC Full  -  ( 13 Aug 2022 05:58 )  WBC Count : 7.60 K/uL  RBC Count : 4.26 M/uL  Hemoglobin : 13.1 g/dL  Hematocrit : 40.3 %  Platelet Count - Automated : 111 K/uL  Mean Cell Volume : 94.6 fl  Mean Cell Hemoglobin : 30.8 pg  Mean Cell Hemoglobin Concentration : 32.5 gm/dL  Auto Neutrophil # : x  Auto Lymphocyte # : x  Auto Monocyte # : x  Auto Eosinophil # : x  Auto Basophil # : x  Auto Neutrophil % : x  Auto Lymphocyte % : x  Auto Monocyte % : x  Auto Eosinophil % : x  Auto Basophil % : x    08-13    141  |  108  |  19  ----------------------------<  109<H>  4.1   |  27  |  0.91    Ca    8.5      13 Aug 2022 05:58  Phos  2.5     08-13  Mg     2.0     08-13             Neurology Progress Note    Interval History/ Subjective: CT L spine mild spondylosis, no hematoma or fluid abscess. Feels well, pain under control with dilaudid.       PAST MEDICAL & SURGICAL HISTORY:  HTN (hypertension)    HLD (hyperlipidemia)    Diabetes 1.5, managed as type 2    PVD (peripheral vascular disease)    Atrial fibrillation    CAD (coronary artery disease)    No significant past surgical history            Medications:  acetaminophen     Tablet .. 650 milliGRAM(s) Oral every 6 hours PRN  atorvastatin 20 milliGRAM(s) Oral at bedtime  benzocaine 15 mG/menthol 3.6 mG Lozenge 1 Lozenge Oral two times a day PRN  cephalexin 500 milliGRAM(s) Oral every 12 hours  dextrose 5%. 1000 milliLiter(s) IV Continuous <Continuous>  dextrose 50% Injectable 25 Gram(s) IV Push once  dextrose Oral Gel 15 Gram(s) Oral once PRN  gabapentin 100 milliGRAM(s) Oral three times a day  glucagon  Injectable 1 milliGRAM(s) IntraMuscular once  ibuprofen  Tablet. 200 milliGRAM(s) Oral every 4 hours  insulin lispro (ADMELOG) corrective regimen sliding scale   SubCutaneous Before meals and at bedtime  lidocaine 4% Cream 1 Application(s) Topical four times a day  lisinopril 20 milliGRAM(s) Oral daily  metoprolol succinate ER 25 milliGRAM(s) Oral daily  phenazopyridine 200 milliGRAM(s) Oral every 8 hours  senna 2 Tablet(s) Oral at bedtime      Vital Signs Last 24 Hrs  T(C): 36.2 (13 Aug 2022 09:00), Max: 37.1 (12 Aug 2022 18:19)  T(F): 97.2 (13 Aug 2022 09:00), Max: 98.7 (12 Aug 2022 18:19)  HR: 60 (13 Aug 2022 11:27) (60 - 70)  BP: 126/63 (13 Aug 2022 11:27) (110/55 - 128/60)  BP(mean): 86 (13 Aug 2022 11:27) (77 - 86)  RR: 18 (13 Aug 2022 11:27) (17 - 18)  SpO2: 97% (13 Aug 2022 11:27) (95% - 98%)    Parameters below as of 13 Aug 2022 11:27  Patient On (Oxygen Delivery Method): room air      Physical exam:  PHYSICAL EXAM:  Constitutional: elderly male NAD  HEENT: PERRL, EOMI, sclera non-icteric, neck supple, trachea midline, no masses, no JVD, MMM, good dentition  Respiratory: CTA b/l, good air entry b/l, no wheezing, no rhonchi, no rales, without accessory muscle use and no intercostal retractions  Cardiovascular: RRR, normal S1S2, no M/R/G  Gastrointestinal: soft, NTND, no masses palpable, BS normal  : Red urine draining from joya  Extremities: Warm, well perfused. Discoloration of b/l shins, charcot L foot with 3 toe amputations  Skin: Normal temperature, warm, dry  Back: No TTP on lower back surgical site  Neurological Exam:   Mental status: AAOx4.  Recent and remote memory intact.  Naming, repetition and comprehension intact.  Attention/concentration intact.  No dysarthria, no aphasia.  Fund of knowledge appropriate.    Cranial nerves: Pupils equally round and reactive to light, visual fields full, no nystagmus, extraocular muscles intact, V1 through V3 intact bilaterally and symmetric ptosis L eye  hearing intact to finger rub, palate elevation symmetric, tongue was midline.  Motor:  MRC grading 5/5 b/l UE/LE.   strength 5/5.  Normal tone and bulk.  No abnormal movements.    Sensation: Intact to light touch, proprioception and cold. Mildly decreased vibratory at left feet  Coordination: No dysmetria on finger-to-nose and heel-to-shin.  No dysdiadokinesia.  Reflexes: 2+ in bilateral UE/LE, downgoing toes bilaterally. (-) Tabor.  Gait: Wide based gate. InitiaNo ataxia.     Labs:  CBC Full  -  ( 13 Aug 2022 05:58 )  WBC Count : 7.60 K/uL  RBC Count : 4.26 M/uL  Hemoglobin : 13.1 g/dL  Hematocrit : 40.3 %  Platelet Count - Automated : 111 K/uL  Mean Cell Volume : 94.6 fl  Mean Cell Hemoglobin : 30.8 pg  Mean Cell Hemoglobin Concentration : 32.5 gm/dL  Auto Neutrophil # : x  Auto Lymphocyte # : x  Auto Monocyte # : x  Auto Eosinophil # : x  Auto Basophil # : x  Auto Neutrophil % : x  Auto Lymphocyte % : x  Auto Monocyte % : x  Auto Eosinophil % : x  Auto Basophil % : x    08-13    141  |  108  |  19  ----------------------------<  109<H>  4.1   |  27  |  0.91    Ca    8.5      13 Aug 2022 05:58  Phos  2.5     08-13  Mg     2.0     08-13

## 2022-08-13 NOTE — PROGRESS NOTE ADULT - SUBJECTIVE AND OBJECTIVE BOX
QUINCY MELLO  [id number]  08-13-22 @ 05:59      UROLOGY SERVICE: DAILY PROGRESS NOTE    Chief admitting complaint: BPH    Patient developed severe, 10/10 lower back pain radiating to bilateral thighs at starting 7pm yesterday.  Seen by anesthesia attending and neurology.  CT of T and L spine performed.  At 1am, pain completely resolved for patient without taking any pain medication.  At 6am, back pain recurred again, 10/10 and patient requesting IV pains meds now, which through the night he had declined.  No N/V/D/F.        LABS:   12 Aug 2022 06:51    141    |  111    |  13     ----------------------------<  118    4.1     |  28     |  1.09     Ca    7.7        12 Aug 2022 06:51                            13.7   6.60  )-----------( 115      ( 12 Aug 2022 06:51 )             42.9       I/O:  I&O's Summary    11 Aug 2022 07:01  -  12 Aug 2022 07:00  --------------------------------------------------------  IN: 8092 mL / OUT: 62005 mL / NET: -3058 mL    12 Aug 2022 07:01  -  13 Aug 2022 05:59  --------------------------------------------------------  IN: 680 mL / OUT: 3375 mL / NET: -2695 mL        VITALS:  Vital Signs Last 24 Hrs  T(C): 36.7 (08-12-22 @ 22:28), Max: 37.1 (08-12-22 @ 13:08)  T(F): 98.1 (08-12-22 @ 22:28), Max: 98.8 (08-12-22 @ 13:08)  HR: 60 (08-13-22 @ 04:00) (50 - 70)  BP: 124/62 (08-13-22 @ 04:00) (106/52 - 128/60)  BP(mean): 86 (08-13-22 @ 04:00) (75 - 86)  RR: 18 (08-13-22 @ 04:00) (16 - 18)  SpO2: 95% (08-13-22 @ 04:00) (95% - 98%)      PHYSICAL EXAM:    GEN: NAD, AAOx3  HEENT: NC/AT, hearing grossly intact b/l, PERRL, neck supple, soft, good ROM  RESP: good inspiratory effort, no stridor, no wheezing  ABD: soft, NT/ND, no guarding or rigidity  : joya in place draining clear urine  EXT: b/l LE with SCDS on  SKIN: No rashes, lesions, ulcerations.  No ecchymosis or swelling to lower back where spinal anesthesia was performed.  Neuro: upper/lower extremities 5/5 strength, gait  wnl, no pronator drift

## 2022-08-13 NOTE — PROGRESS NOTE ADULT - ASSESSMENT
71 y.o. M patient s/p prostate enucleation 8/11 with spinal anesthesia.  At 7pm 8/12, patient developed severe, 10/10, sharp/burning pain from lower back that radiates to buttocks and anterior thigh.  Seen by anesthesia attending, who recommended Neurology consult.       Neurology saw patient and recommended CT T and L spine, which was performed and read is pending.  Possible MRI.  Will be seen this AM by attending.    Throughout the initial back pain episode, patient declined narcotic pain meds.  Around 1am, pain completely resolved.  However, it has now recurred at 6am and patient is asking for IV dilaudid.  He also has lidocaine patch to lower back. 71 y.o. M patient s/p prostate enucleation 8/11 with spinal anesthesia.  At 7pm 8/12, patient developed severe, 10/10, sharp/burning pain from lower back that radiates to buttocks and anterior thigh.  Seen by anesthesia attending, who recommended Neurology consult.       Neurology saw patient and recommended CT T and L spine, which was performed and read is pending.  Possible MRI.  Will be seen this AM by attending.  Prior MRI of L/S spine from 03/22: L4-5: Disc bulge    Throughout the initial back pain episode, patient declined narcotic pain meds.  Around 1am, pain completely resolved.  However, it has now recurred at 6am and patient is asking for IV dilaudid.  He also has lidocaine patch to lower back. 71 y.o. M patient s/p prostate enucleation 8/11 with spinal anesthesia.  At 7pm 8/12, patient developed severe, 10/10, sharp/burning pain from lower back that radiates to buttocks and anterior thigh.  Seen by anesthesia attending, who recommended Neurology consult.       Neurology saw patient and recommended CT T and L spine contrast (however radiology changed to noncontrast), which was performed and read is pending. Possible MRI this morning.  To be seen this AM by attending.  Prior MRI of L/S spine from 03/22: L4-5: Disc bulge    Throughout the initial back pain episode, patient declined narcotic pain meds.  Around 1am, pain completely resolved.  However, it has now recurred at 6am and patient is asking for IV dilaudid.  He also has lidocaine patch to lower back.

## 2022-08-14 ENCOUNTER — TRANSCRIPTION ENCOUNTER (OUTPATIENT)
Age: 72
End: 2022-08-14

## 2022-08-14 VITALS
TEMPERATURE: 97 F | RESPIRATION RATE: 18 BRPM | DIASTOLIC BLOOD PRESSURE: 78 MMHG | HEART RATE: 67 BPM | OXYGEN SATURATION: 97 % | SYSTOLIC BLOOD PRESSURE: 143 MMHG

## 2022-08-14 LAB
GLUCOSE BLDC GLUCOMTR-MCNC: 107 MG/DL — HIGH (ref 70–99)
GLUCOSE BLDC GLUCOMTR-MCNC: 123 MG/DL — HIGH (ref 70–99)
GLUCOSE BLDC GLUCOMTR-MCNC: 207 MG/DL — HIGH (ref 70–99)

## 2022-08-14 PROCEDURE — C1889: CPT

## 2022-08-14 PROCEDURE — 72128 CT CHEST SPINE W/O DYE: CPT

## 2022-08-14 PROCEDURE — 87086 URINE CULTURE/COLONY COUNT: CPT

## 2022-08-14 PROCEDURE — 88305 TISSUE EXAM BY PATHOLOGIST: CPT

## 2022-08-14 PROCEDURE — 99232 SBSQ HOSP IP/OBS MODERATE 35: CPT

## 2022-08-14 PROCEDURE — 85025 COMPLETE CBC W/AUTO DIFF WBC: CPT

## 2022-08-14 PROCEDURE — 82962 GLUCOSE BLOOD TEST: CPT

## 2022-08-14 PROCEDURE — 72158 MRI LUMBAR SPINE W/O & W/DYE: CPT

## 2022-08-14 PROCEDURE — 82550 ASSAY OF CK (CPK): CPT

## 2022-08-14 PROCEDURE — 84484 ASSAY OF TROPONIN QUANT: CPT

## 2022-08-14 PROCEDURE — C1782: CPT

## 2022-08-14 PROCEDURE — 83735 ASSAY OF MAGNESIUM: CPT

## 2022-08-14 PROCEDURE — 36415 COLL VENOUS BLD VENIPUNCTURE: CPT

## 2022-08-14 PROCEDURE — 84100 ASSAY OF PHOSPHORUS: CPT

## 2022-08-14 PROCEDURE — 83036 HEMOGLOBIN GLYCOSYLATED A1C: CPT

## 2022-08-14 PROCEDURE — 82553 CREATINE MB FRACTION: CPT

## 2022-08-14 PROCEDURE — 80048 BASIC METABOLIC PNL TOTAL CA: CPT

## 2022-08-14 PROCEDURE — 85027 COMPLETE CBC AUTOMATED: CPT

## 2022-08-14 PROCEDURE — 72131 CT LUMBAR SPINE W/O DYE: CPT

## 2022-08-14 PROCEDURE — A9585: CPT

## 2022-08-14 RX ORDER — GABAPENTIN 400 MG/1
1 CAPSULE ORAL
Qty: 30 | Refills: 0
Start: 2022-08-14 | End: 2022-08-23

## 2022-08-14 RX ADMIN — LISINOPRIL 20 MILLIGRAM(S): 2.5 TABLET ORAL at 06:27

## 2022-08-14 RX ADMIN — LIDOCAINE 1 APPLICATION(S): 4 CREAM TOPICAL at 06:26

## 2022-08-14 RX ADMIN — Medication 200 MILLIGRAM(S): at 06:27

## 2022-08-14 RX ADMIN — RIVAROXABAN 20 MILLIGRAM(S): KIT at 08:15

## 2022-08-14 RX ADMIN — LIDOCAINE 1 APPLICATION(S): 4 CREAM TOPICAL at 00:09

## 2022-08-14 RX ADMIN — Medication 4: at 13:33

## 2022-08-14 RX ADMIN — GABAPENTIN 100 MILLIGRAM(S): 400 CAPSULE ORAL at 06:27

## 2022-08-14 RX ADMIN — TRAMADOL HYDROCHLORIDE 50 MILLIGRAM(S): 50 TABLET ORAL at 00:21

## 2022-08-14 RX ADMIN — Medication 25 MILLIGRAM(S): at 06:27

## 2022-08-14 RX ADMIN — CYCLOBENZAPRINE HYDROCHLORIDE 5 MILLIGRAM(S): 10 TABLET, FILM COATED ORAL at 00:08

## 2022-08-14 NOTE — PROGRESS NOTE ADULT - SUBJECTIVE AND OBJECTIVE BOX
INTERVAL HPI/OVERNIGHT EVENTS:  No acute events overnight.    VITALS:    T(F): 97.5 (08-14-22 @ 05:49), Max: 97.8 (08-13-22 @ 14:17)  HR: 66 (08-14-22 @ 05:49) (59 - 66)  BP: 148/85 (08-14-22 @ 05:49) (117/70 - 154/81)  RR: 19 (08-14-22 @ 05:49) (17 - 19)  SpO2: 95% (08-14-22 @ 05:49) (95% - 98%)  Wt(kg): --    I&O's Detail    12 Aug 2022 07:01  -  13 Aug 2022 07:00  --------------------------------------------------------  IN:    Oral Fluid: 680 mL  Total IN: 680 mL    OUT:    Indwelling Catheter - Urethral (mL): 1750 mL    Voided (mL): 1625 mL  Total OUT: 3375 mL    Total NET: -2695 mL      13 Aug 2022 07:01  -  14 Aug 2022 06:21  --------------------------------------------------------  IN:    Oral Fluid: 840 mL  Total IN: 840 mL    OUT:    Indwelling Catheter - Urethral (mL): 5275 mL    Voided (mL): 1400 mL  Total OUT: 6675 mL    Total NET: -5835 mL          MEDICATIONS:    ANTIBIOTICS:      PAIN CONTROL:  acetaminophen     Tablet .. 650 milliGRAM(s) Oral every 6 hours PRN  gabapentin 100 milliGRAM(s) Oral three times a day  traMADol 50 milliGRAM(s) Oral every 6 hours PRN       MEDS:  phenazopyridine 200 milliGRAM(s) Oral every 8 hours      HEME/ONC  rivaroxaban 20 milliGRAM(s) Oral with dinner        PHYSICAL EXAM:  General: No acute distress.  Alert and Oriented  Abdominal Exam: soft, nt, nd   Exam: 18fr coude in place, yellow clear urine, no suprapubic tenderness to palpation      LABS:                        13.1   7.60  )-----------( 111      ( 13 Aug 2022 05:58 )             40.3     08-13    141  |  108  |  19  ----------------------------<  109<H>  4.1   |  27  |  0.91    Ca    8.5      13 Aug 2022 05:58  Phos  2.5     08-13  Mg     2.0     08-13

## 2022-08-14 NOTE — PROGRESS NOTE ADULT - SUBJECTIVE AND OBJECTIVE BOX
OVERNIGHT EVENTS: naeon  VITAL SIGNS:  Vital Signs Last 24 Hrs  T(C): 36.1 (14 Aug 2022 13:01), Max: 36.6 (13 Aug 2022 20:28)  T(F): 97 (14 Aug 2022 13:01), Max: 97.8 (13 Aug 2022 20:28)  HR: 67 (14 Aug 2022 13:01) (59 - 67)  BP: 143/78 (14 Aug 2022 13:01) (117/70 - 154/81)  BP(mean): --  RR: 18 (14 Aug 2022 13:01) (17 - 19)  SpO2: 97% (14 Aug 2022 13:01) (95% - 98%)    Parameters below as of 14 Aug 2022 13:01  Patient On (Oxygen Delivery Method): room air        PHYSICAL EXAM:    General: WDWN  HEENT: NC/AT; PERRL, anicteric sclera; MMM  Neck: supple  Cardiovascular: +S1/S2; RRR  Respiratory: CTA B/L; no W/R/R  Gastrointestinal: soft, NT/ND; +BSx4  Extremities: WWP; no edema, clubbing or cyanosis  Vascular: 2+ radial, DP/PT pulses B/L  Neurological: AAOx3; no focal deficits    MEDICATIONS:  MEDICATIONS  (STANDING):  atorvastatin 20 milliGRAM(s) Oral at bedtime  dextrose 5%. 1000 milliLiter(s) (50 mL/Hr) IV Continuous <Continuous>  dextrose 50% Injectable 25 Gram(s) IV Push once  gabapentin 100 milliGRAM(s) Oral three times a day  glucagon  Injectable 1 milliGRAM(s) IntraMuscular once  insulin lispro (ADMELOG) corrective regimen sliding scale   SubCutaneous Before meals and at bedtime  lidocaine 4% Cream 1 Application(s) Topical four times a day  lisinopril 20 milliGRAM(s) Oral daily  metoprolol succinate ER 25 milliGRAM(s) Oral daily  phenazopyridine 200 milliGRAM(s) Oral every 8 hours  rivaroxaban 20 milliGRAM(s) Oral with dinner  senna 2 Tablet(s) Oral at bedtime    MEDICATIONS  (PRN):  acetaminophen     Tablet .. 650 milliGRAM(s) Oral every 6 hours PRN Temp greater or equal to 38C (100.4F), Mild Pain (1 - 3)  benzocaine 15 mG/menthol 3.6 mG Lozenge 1 Lozenge Oral two times a day PRN Sore Throat  dextrose Oral Gel 15 Gram(s) Oral once PRN Blood Glucose LESS THAN 70 milliGRAM(s)/deciliter  traMADol 50 milliGRAM(s) Oral every 6 hours PRN severe breakthrough pain      ALLERGIES:  Allergies    No Known Allergies    Intolerances        LABS:                        13.1   7.60  )-----------( 111      ( 13 Aug 2022 05:58 )             40.3     08-13    141  |  108  |  19  ----------------------------<  109<H>  4.1   |  27  |  0.91    Ca    8.5      13 Aug 2022 05:58  Phos  2.5     08-13  Mg     2.0     08-13          CAPILLARY BLOOD GLUCOSE      POCT Blood Glucose.: 123 mg/dL (14 Aug 2022 13:37)      RADIOLOGY & ADDITIONAL TESTS: Reviewed.    ASSESSMENT:    PLAN:

## 2022-08-14 NOTE — PROGRESS NOTE ADULT - ASSESSMENT
72yo male PMH of CAD, cardiac stents 2019, HTN, DM, HLD s/p prostate enucleation with intense crushing and radiating lower back pain following prostate surgery. CT L spine mild spondylosis, no hematoma or fluid abscess.     Recommendations:  - MR Lumbar with/without contrast-No acute fracture or malalignment. No pathologic enhancement.  L4-5 disc bulge asymmetric to the left with facet hypertrophy resulting   in mild spinal canal stenosis with left lateral recess narrowing with   moderate left neural foraminal narrowing which is stable since prior MRI   lumbar spine 3/20/2022.  - C/w Gabapentin 100 TID for neuropathic pain  - Can consider toradol for OP pain management  - Adequate pain management.  - To follow up with Dr. Scott after discharge-office to call and set up appt.    Neuro signing off, please reconsult with further questions.    Case discussed with attending.

## 2022-08-14 NOTE — DISCHARGE NOTE NURSING/CASE MANAGEMENT/SOCIAL WORK - PATIENT PORTAL LINK FT
You can access the FollowMyHealth Patient Portal offered by [facility] by registering at the following website: [email or web address]. By joining [facility]’s FollowMyHealth portal, you will also be able to view your health information using other applications (apps) compatible with our system.

## 2022-08-14 NOTE — PROGRESS NOTE ADULT - PROBLEM SELECTOR PLAN 1
-s/p prostate enucleation, failed trial of void 8/12  -joya in place to gravity-TOV in am  -regular diet  -pain control  -IS/SCDs  -DVT ppx  -OOB  -Xarelto restarted  -d/c planning -s/p prostate enucleation, failed trial of void 8/12  -DC [place]- TOV  -regular diet  -pain control  -IS/SCDs  -DVT ppx  -OOB  -Xarelto restarted  -discharge home today

## 2022-08-14 NOTE — PROGRESS NOTE ADULT - ASSESSMENT
71 y.o. M patient s/p prostate enucleation 8/11 with spinal anesthesia.  At 7pm 8/12, patient developed severe, 10/10, sharp/burning pain from lower back that radiates to buttocks and anterior thigh.  Neuro following. Pain now improved however persistent discomfort appreciated.

## 2022-08-14 NOTE — PROGRESS NOTE ADULT - PROBLEM SELECTOR PLAN 2
- continue home medications: metoprolol ER, ramipril 5mg at home lisinopril conversion inpatient   - post op EKG done and per cardiac anesthesiologist unchanged from preop   - cardiac enzymes negative   - per PCP okay to hold antiplatelet in post op state.
-MRI-Likely muscle sprain  -follow neuro final recs  -pain control  -Tramadol for pain o/n with muscle relaxer
-F/U CT T and L spine read  -F/U Neurology recs
- continue home medications: metoprolol ER, ramipril 5mg at home lisinopril conversion inpatient   - post op EKG done and per cardiac anesthesiologist unchanged from preop   - cardiac enzymes negative   - per PCP okay to hold antiplatelet in post op state

## 2022-08-14 NOTE — PROGRESS NOTE ADULT - PROBLEM SELECTOR PROBLEM 1
BPH with obstruction/lower urinary tract symptoms

## 2022-08-16 ENCOUNTER — APPOINTMENT (OUTPATIENT)
Dept: UROLOGY | Facility: CLINIC | Age: 72
End: 2022-08-16

## 2022-08-16 DIAGNOSIS — R33.9 RETENTION OF URINE, UNSPECIFIED: ICD-10-CM

## 2022-08-16 PROCEDURE — 51798 US URINE CAPACITY MEASURE: CPT

## 2022-08-16 PROCEDURE — 99214 OFFICE O/P EST MOD 30 MIN: CPT | Mod: 24

## 2022-08-16 NOTE — HISTORY OF PRESENT ILLNESS
[FreeTextEntry1] : 71 year old man with history CAD s/p 2 stents, diabetes, PVD, admitted to [facility] earlier this year with mild CVA, urinary retention, prostatitis. Hemoglobin A1c was 12 at that time. Catheter was recommended, but patient declined. He was started on flomax 0.8 mg. He subsequently had cystoscopy that showed a very distended bladder with grade 3 trabeculations. He went into urinary retention after the cystoscopy. He then had a urodynamic study that demonstrated decreased bladder sensation, increased bladder capacity, decreased contractility with decreased flow rate, incomplete bladder emptying. He went into urinary retention after the UDS. A catheter was placed. He removed the catheter this morning and has been voiding in 200 cc increments since then.\par \par MRI prostate:48 cc prostate, no concerning lesions\par \par 7/27/22: urgent follow up for discomfort from catheter. He feels penile pain. He is concerned he has a UTI. He denies fevers or chills. Catheter is draining well.\par \par 8/2/22: Presents with nausea without vomiting and catheter discomfort. He has been constipated with no BM in last 3 days. He feels bladder spasms and has been taking oxybutynin three times daily. No fevers, chills, hematuria. \par \par Urine culture from 7/27/22 with coag negative staph.\par \par 8/11/22: S/P ThuLEP under spinal anesthesia. Procedure uncomplicated. Failed void trial POD 1. Catheter replaced, although patient was not uncomfortable and was voiding, he was anxious about high residuals. He developed severe back pain and leg parasthesias POD 1. MRI of the spine was negative for hematoma, infection or spinal cord compression. Pain improved. Neurology evaluated and cleared patient. Passed void trial POD 3. \par \par 8/16/22: Doing well. Continues to feel that he is not urinating well all of the time, though he does urinate large volumes with good flow a few times per day. Continues to have hematuria after restarting xarelto. No fevers, chills, dysuria, flank pain. \par \par  cc

## 2022-08-16 NOTE — LETTER BODY
[Dear  ___] : Dear  [unfilled], [Courtesy Letter:] : I had the pleasure of seeing your patient, [unfilled], in my office today. [Please see my note below.] : Please see my note below. [Consult Closing:] : Thank you very much for allowing me to participate in the care of this patient.  If you have any questions, please do not hesitate to contact me. [Sincerely,] : Sincerely, [FreeTextEntry3] : Dorene Peoples MD

## 2022-08-17 LAB — SURGICAL PATHOLOGY STUDY: SIGNIFICANT CHANGE UP

## 2022-08-18 ENCOUNTER — APPOINTMENT (OUTPATIENT)
Dept: OPHTHALMOLOGY | Facility: CLINIC | Age: 72
End: 2022-08-18

## 2022-08-18 ENCOUNTER — NON-APPOINTMENT (OUTPATIENT)
Age: 72
End: 2022-08-18

## 2022-08-18 DIAGNOSIS — R33.8 OTHER RETENTION OF URINE: ICD-10-CM

## 2022-08-18 DIAGNOSIS — Z86.73 PERSONAL HISTORY OF TRANSIENT ISCHEMIC ATTACK (TIA), AND CEREBRAL INFARCTION WITHOUT RESIDUAL DEFICITS: ICD-10-CM

## 2022-08-18 DIAGNOSIS — M48.061 SPINAL STENOSIS, LUMBAR REGION WITHOUT NEUROGENIC CLAUDICATION: ICD-10-CM

## 2022-08-18 DIAGNOSIS — N32.3 DIVERTICULUM OF BLADDER: ICD-10-CM

## 2022-08-18 DIAGNOSIS — N13.8 OTHER OBSTRUCTIVE AND REFLUX UROPATHY: ICD-10-CM

## 2022-08-18 DIAGNOSIS — I48.20 CHRONIC ATRIAL FIBRILLATION, UNSPECIFIED: ICD-10-CM

## 2022-08-18 DIAGNOSIS — D30.3 BENIGN NEOPLASM OF BLADDER: ICD-10-CM

## 2022-08-18 DIAGNOSIS — Z95.5 PRESENCE OF CORONARY ANGIOPLASTY IMPLANT AND GRAFT: ICD-10-CM

## 2022-08-18 DIAGNOSIS — I25.10 ATHEROSCLEROTIC HEART DISEASE OF NATIVE CORONARY ARTERY WITHOUT ANGINA PECTORIS: ICD-10-CM

## 2022-08-18 DIAGNOSIS — E11.51 TYPE 2 DIABETES MELLITUS WITH DIABETIC PERIPHERAL ANGIOPATHY WITHOUT GANGRENE: ICD-10-CM

## 2022-08-18 DIAGNOSIS — N40.1 BENIGN PROSTATIC HYPERPLASIA WITH LOWER URINARY TRACT SYMPTOMS: ICD-10-CM

## 2022-08-18 DIAGNOSIS — M47.816 SPONDYLOSIS WITHOUT MYELOPATHY OR RADICULOPATHY, LUMBAR REGION: ICD-10-CM

## 2022-08-18 DIAGNOSIS — E78.5 HYPERLIPIDEMIA, UNSPECIFIED: ICD-10-CM

## 2022-08-18 DIAGNOSIS — H02.402 UNSPECIFIED PTOSIS OF LEFT EYELID: ICD-10-CM

## 2022-08-18 DIAGNOSIS — I10 ESSENTIAL (PRIMARY) HYPERTENSION: ICD-10-CM

## 2022-08-18 DIAGNOSIS — N40.0 BENIGN PROSTATIC HYPERPLASIA WITHOUT LOWER URINARY TRACT SYMPTOMS: ICD-10-CM

## 2022-08-18 DIAGNOSIS — N32.89 OTHER SPECIFIED DISORDERS OF BLADDER: ICD-10-CM

## 2022-08-18 DIAGNOSIS — Z79.84 LONG TERM (CURRENT) USE OF ORAL HYPOGLYCEMIC DRUGS: ICD-10-CM

## 2022-08-18 DIAGNOSIS — E11.40 TYPE 2 DIABETES MELLITUS WITH DIABETIC NEUROPATHY, UNSPECIFIED: ICD-10-CM

## 2022-08-18 DIAGNOSIS — Z79.82 LONG TERM (CURRENT) USE OF ASPIRIN: ICD-10-CM

## 2022-08-18 PROCEDURE — 92014 COMPRE OPH EXAM EST PT 1/>: CPT

## 2022-08-18 PROCEDURE — 92083 EXTENDED VISUAL FIELD XM: CPT

## 2022-08-18 PROCEDURE — 76514 ECHO EXAM OF EYE THICKNESS: CPT

## 2022-08-18 PROCEDURE — 92020 GONIOSCOPY: CPT

## 2022-08-18 PROCEDURE — 92250 FUNDUS PHOTOGRAPHY W/I&R: CPT

## 2022-08-29 NOTE — ASU PATIENT PROFILE, ADULT - FALL HARM RISK - UNIVERSAL INTERVENTIONS
Bed in lowest position, wheels locked, appropriate side rails in place/Call bell, personal items and telephone in reach/Instruct patient to call for assistance before getting out of bed or chair/Non-slip footwear when patient is out of bed/[place] to call system/Physically safe environment - no spills, clutter or unnecessary equipment/Purposeful Proactive Rounding/Room/bathroom lighting operational, light cord in reach

## 2022-08-31 ENCOUNTER — NON-APPOINTMENT (OUTPATIENT)
Age: 72
End: 2022-08-31

## 2022-08-31 ENCOUNTER — APPOINTMENT (OUTPATIENT)
Dept: OPHTHALMOLOGY | Facility: AMBULATORY SURGERY CENTER | Age: 72
End: 2022-08-31

## 2022-08-31 ENCOUNTER — OUTPATIENT (OUTPATIENT)
Dept: OUTPATIENT SERVICES | Facility: HOSPITAL | Age: 72
LOS: 1 days | Discharge: HOME | End: 2022-08-31

## 2022-08-31 VITALS
TEMPERATURE: 98 F | OXYGEN SATURATION: 99 % | HEIGHT: 68.5 IN | DIASTOLIC BLOOD PRESSURE: 68 MMHG | SYSTOLIC BLOOD PRESSURE: 112 MMHG | RESPIRATION RATE: 16 BRPM | WEIGHT: 176.81 LBS | HEART RATE: 61 BPM

## 2022-08-31 VITALS
OXYGEN SATURATION: 98 % | HEART RATE: 55 BPM | SYSTOLIC BLOOD PRESSURE: 123 MMHG | TEMPERATURE: 98 F | RESPIRATION RATE: 14 BRPM | DIASTOLIC BLOOD PRESSURE: 65 MMHG

## 2022-08-31 LAB — GLUCOSE BLDC GLUCOMTR-MCNC: 117 MG/DL — HIGH (ref 70–99)

## 2022-08-31 PROCEDURE — 66982 XCAPSL CTRC RMVL CPLX WO ECP: CPT | Mod: LT

## 2022-08-31 DEVICE — IMPLANTABLE DEVICE
Type: IMPLANTABLE DEVICE | Status: NON-FUNCTIONAL
Removed: 2022-08-31

## 2022-08-31 RX ORDER — SODIUM CHLORIDE 9 MG/ML
1000 INJECTION, SOLUTION INTRAVENOUS
Refills: 0 | Status: DISCONTINUED | OUTPATIENT
Start: 2022-08-31 | End: 2022-08-31

## 2022-08-31 RX ORDER — CYCLOPENTOLATE HYDROCHLORIDE 10 MG/ML
1 SOLUTION/ DROPS OPHTHALMIC
Refills: 0 | Status: COMPLETED | OUTPATIENT
Start: 2022-08-31 | End: 2022-08-31

## 2022-08-31 RX ORDER — PHENYLEPHRINE HCL 2.5 %
1 DROPS OPHTHALMIC (EYE)
Refills: 0 | Status: COMPLETED | OUTPATIENT
Start: 2022-08-31 | End: 2022-08-31

## 2022-08-31 RX ORDER — KETOROLAC TROMETHAMINE 0.5 %
1 DROPS OPHTHALMIC (EYE)
Refills: 0 | Status: COMPLETED | OUTPATIENT
Start: 2022-08-31 | End: 2022-08-31

## 2022-08-31 RX ORDER — OFLOXACIN 0.3 %
1 DROPS OPHTHALMIC (EYE)
Refills: 0 | Status: COMPLETED | OUTPATIENT
Start: 2022-08-31 | End: 2022-08-31

## 2022-08-31 RX ORDER — TROPICAMIDE 1 %
1 DROPS OPHTHALMIC (EYE)
Refills: 0 | Status: COMPLETED | OUTPATIENT
Start: 2022-08-31 | End: 2022-08-31

## 2022-08-31 RX ADMIN — Medication 1 DROP(S): at 12:25

## 2022-08-31 RX ADMIN — CYCLOPENTOLATE HYDROCHLORIDE 1 DROP(S): 10 SOLUTION/ DROPS OPHTHALMIC at 12:20

## 2022-08-31 RX ADMIN — Medication 1 DROP(S): at 12:15

## 2022-08-31 RX ADMIN — Medication 1 DROP(S): at 12:20

## 2022-08-31 RX ADMIN — CYCLOPENTOLATE HYDROCHLORIDE 1 DROP(S): 10 SOLUTION/ DROPS OPHTHALMIC at 12:25

## 2022-08-31 RX ADMIN — CYCLOPENTOLATE HYDROCHLORIDE 1 DROP(S): 10 SOLUTION/ DROPS OPHTHALMIC at 12:15

## 2022-08-31 NOTE — PRE-ANESTHESIA EVALUATION ADULT - BP NONINVASIVE SYSTOLIC (MM HG)
63 year old male with insignificant pmh coming to hospital with worsening shortness of breath. patient found to have covid and respiratory failure subsequently with complicated icu course including leukocytosis with bacterial pna s/p abx, gi bleed and pe, s/p trach and peg. patient now being downgraded to medicine service as table. will restart full dose. 112

## 2022-09-01 ENCOUNTER — NON-APPOINTMENT (OUTPATIENT)
Age: 72
End: 2022-09-01

## 2022-09-01 ENCOUNTER — APPOINTMENT (OUTPATIENT)
Dept: OPHTHALMOLOGY | Facility: CLINIC | Age: 72
End: 2022-09-01

## 2022-09-01 PROCEDURE — 99024 POSTOP FOLLOW-UP VISIT: CPT

## 2022-09-06 ENCOUNTER — NON-APPOINTMENT (OUTPATIENT)
Age: 72
End: 2022-09-06

## 2022-09-06 ENCOUNTER — APPOINTMENT (OUTPATIENT)
Dept: OPHTHALMOLOGY | Facility: CLINIC | Age: 72
End: 2022-09-06

## 2022-09-06 PROCEDURE — 99024 POSTOP FOLLOW-UP VISIT: CPT

## 2022-09-07 ENCOUNTER — APPOINTMENT (OUTPATIENT)
Dept: UROLOGY | Facility: CLINIC | Age: 72
End: 2022-09-07

## 2022-09-16 ENCOUNTER — APPOINTMENT (OUTPATIENT)
Dept: OPHTHALMOLOGY | Facility: CLINIC | Age: 72
End: 2022-09-16

## 2022-09-16 ENCOUNTER — NON-APPOINTMENT (OUTPATIENT)
Age: 72
End: 2022-09-16

## 2022-09-16 PROCEDURE — 65222 REMOVE FOREIGN BODY FROM EYE: CPT | Mod: 79,LT

## 2022-09-23 ENCOUNTER — APPOINTMENT (OUTPATIENT)
Dept: OPHTHALMOLOGY | Facility: CLINIC | Age: 72
End: 2022-09-23

## 2022-09-23 ENCOUNTER — NON-APPOINTMENT (OUTPATIENT)
Age: 72
End: 2022-09-23

## 2022-09-23 PROCEDURE — 99024 POSTOP FOLLOW-UP VISIT: CPT

## 2022-09-26 ENCOUNTER — NON-APPOINTMENT (OUTPATIENT)
Age: 72
End: 2022-09-26

## 2022-09-27 ENCOUNTER — APPOINTMENT (OUTPATIENT)
Dept: UROLOGY | Facility: CLINIC | Age: 72
End: 2022-09-27

## 2022-09-27 VITALS
DIASTOLIC BLOOD PRESSURE: 60 MMHG | SYSTOLIC BLOOD PRESSURE: 90 MMHG | HEART RATE: 70 BPM | BODY MASS INDEX: 26.98 KG/M2 | TEMPERATURE: 97.7 F | HEIGHT: 68 IN | WEIGHT: 178 LBS

## 2022-09-27 DIAGNOSIS — R35.1 BENIGN PROSTATIC HYPERPLASIA WITH LOWER URINARY TRACT SYMPMS: ICD-10-CM

## 2022-09-27 DIAGNOSIS — Z87.898 PERSONAL HISTORY OF OTHER SPECIFIED CONDITIONS: ICD-10-CM

## 2022-09-27 DIAGNOSIS — R33.9 RETENTION OF URINE, UNSPECIFIED: ICD-10-CM

## 2022-09-27 DIAGNOSIS — N40.1 BENIGN PROSTATIC HYPERPLASIA WITH LOWER URINARY TRACT SYMPMS: ICD-10-CM

## 2022-09-27 PROCEDURE — 51798 US URINE CAPACITY MEASURE: CPT

## 2022-09-27 PROCEDURE — 99214 OFFICE O/P EST MOD 30 MIN: CPT | Mod: 24

## 2022-09-27 NOTE — ASSESSMENT
[FreeTextEntry1] : Assessment: Mr. QUINCY MELLO is a 71 year year old man with incomplete emptying and recurrent urinary retention, 48 cc prostate, cystoscopy consistent with bladder outlet obstruction, UDS demonstrated decrease bladder contractility and high bladder capacity. He appears to have an underactive bladder, likely secondary to chronic bladder outlet obstruction vs diabetic cystopathy. \par \par He underwent a ThuLEP on 8/11/22. Procedure uncomplicated. Pathology with 38 grams benign prostate tissue. He failed void trial POD 1, but passed POD 3. He is voiding currently,with PVR ~350 cc, which is stable from prior. He has new gross hematuria. I reassured patient that he can have intermittent gross hematuria at this point, especially given use of xarelto. If hematuria persists or recurs in the next month, he will let me know. He had urine culture performed last week with E coli and enterobacter. WIll repeat urine culture today given new hematuria, though he does not have other symptoms of UTI. He has relatively heavy leakage, which was not present initially after surgery. We discussed performing Kegel exercises. Patient is interested in pelvic PT as well.\par - F/U In 6 weeks for UA, IPSS, PVR, PSA.\par  - Pelvic PT\par  - Kegels\par  - UA, UCx \par

## 2022-09-27 NOTE — HISTORY OF PRESENT ILLNESS
[FreeTextEntry1] : 71 year old man with history CAD s/p 2 stents, diabetes, PVD, admitted to [facility] earlier this year with mild CVA, urinary retention, prostatitis. Hemoglobin A1c was 12 at that time. Catheter was recommended, but patient declined. He was started on flomax 0.8 mg. He subsequently had cystoscopy that showed a very distended bladder with grade 3 trabeculations. He went into urinary retention after the cystoscopy. He then had a urodynamic study that demonstrated decreased bladder sensation, increased bladder capacity, decreased contractility with decreased flow rate, incomplete bladder emptying. He went into urinary retention after the UDS. A catheter was placed. He removed the catheter this morning and has been voiding in 200 cc increments since then.\par \par MRI prostate:48 cc prostate, no concerning lesions\par \par 7/27/22: urgent follow up for discomfort from catheter. He feels penile pain. He is concerned he has a UTI. He denies fevers or chills. Catheter is draining well.\par \par 8/2/22: Presents with nausea without vomiting and catheter discomfort. He has been constipated with no BM in last 3 days. He feels bladder spasms and has been taking oxybutynin three times daily. No fevers, chills, hematuria. \par \par Urine culture from 7/27/22 with coag negative staph.\par \par 8/11/22: S/P ThuLEP under spinal anesthesia. Procedure uncomplicated. Failed void trial POD 1. Catheter replaced, although patient was not uncomfortable and was voiding, he was anxious about high residuals. He developed severe back pain and leg parasthesias POD 1. MRI of the spine was negative for hematoma, infection or spinal cord compression. Pain improved. Neurology evaluated and cleared patient. Passed void trial POD 3. \par \par 8/16/22: Doing well. Continues to feel that he is not urinating well all of the time, though he does urinate large volumes with good flow a few times per day. Continues to have hematuria after restarting xarelto. No fevers, chills, dysuria, flank pain. \par \par  cc\par \par 9/27/22: Presents for follow up. He has been voiding well with strong stream. No sense of retention. He developed gross hematuria in the last few days with small clots. No difficulty passing clots. He stopped xarelto yesterday. No fevers, chills, dysuria. He had urine culture performed last week with E coli and enterobacter. He started antibiotics yesterday, though the bacteria are resistant to most oral antibiotics. He has had heavy leakage requiring diaper. He is not performing Kegel exercises. Unclear when the leakage started as he was not having this initially.\par \par IPSS 14, QOL 3,  cc

## 2022-09-29 LAB — BACTERIA UR CULT: NORMAL

## 2022-10-01 LAB
APPEARANCE: ABNORMAL
BACTERIA: ABNORMAL
BILIRUBIN URINE: ABNORMAL
BLOOD URINE: ABNORMAL
COLOR: ABNORMAL
GLUCOSE QUALITATIVE U: ABNORMAL
GRANULAR CASTS: 0 /LPF
HYALINE CASTS: 0 /LPF
KETONES URINE: ABNORMAL
LEUKOCYTE ESTERASE URINE: ABNORMAL
MICROSCOPIC-UA: NORMAL
NITRITE URINE: ABNORMAL
PH URINE: ABNORMAL
PROTEIN URINE: ABNORMAL
RED BLOOD CELLS URINE: >720 /HPF
SPECIFIC GRAVITY URINE: ABNORMAL
SQUAMOUS EPITHELIAL CELLS: 0 /HPF
UROBILINOGEN URINE: ABNORMAL
WHITE BLOOD CELLS URINE: 25 /HPF

## 2022-10-06 ENCOUNTER — APPOINTMENT (OUTPATIENT)
Dept: OPHTHALMOLOGY | Facility: CLINIC | Age: 72
End: 2022-10-06

## 2022-10-13 ENCOUNTER — APPOINTMENT (OUTPATIENT)
Dept: OPHTHALMOLOGY | Facility: CLINIC | Age: 72
End: 2022-10-13

## 2022-10-13 ENCOUNTER — NON-APPOINTMENT (OUTPATIENT)
Age: 72
End: 2022-10-13

## 2022-10-13 PROCEDURE — 99024 POSTOP FOLLOW-UP VISIT: CPT

## 2022-11-17 ENCOUNTER — APPOINTMENT (OUTPATIENT)
Dept: OPHTHALMOLOGY | Facility: CLINIC | Age: 72
End: 2022-11-17

## 2022-11-17 ENCOUNTER — NON-APPOINTMENT (OUTPATIENT)
Age: 72
End: 2022-11-17

## 2022-11-17 PROCEDURE — 92250 FUNDUS PHOTOGRAPHY W/I&R: CPT

## 2022-11-17 PROCEDURE — 92014 COMPRE OPH EXAM EST PT 1/>: CPT | Mod: 24

## 2022-11-17 PROCEDURE — 92083 EXTENDED VISUAL FIELD XM: CPT

## 2022-11-17 PROCEDURE — 92020 GONIOSCOPY: CPT

## 2022-11-22 ENCOUNTER — APPOINTMENT (OUTPATIENT)
Dept: OPHTHALMOLOGY | Facility: CLINIC | Age: 72
End: 2022-11-22

## 2022-11-30 ENCOUNTER — APPOINTMENT (OUTPATIENT)
Dept: OPHTHALMOLOGY | Facility: AMBULATORY SURGERY CENTER | Age: 72
End: 2022-11-30

## 2022-12-01 ENCOUNTER — APPOINTMENT (OUTPATIENT)
Dept: OPHTHALMOLOGY | Facility: CLINIC | Age: 72
End: 2022-12-01

## 2022-12-07 ENCOUNTER — APPOINTMENT (OUTPATIENT)
Dept: OPHTHALMOLOGY | Facility: CLINIC | Age: 72
End: 2022-12-07

## 2023-01-01 NOTE — ASSESSMENT
[FreeTextEntry1] : Assessment: Mr. QUINCY MELLO is a 71 year year old man with incomplete emptying and recurrent urinary retention, 48 cc prostate, cystoscopy consistent with bladder outlet obstruction, UDS demonstrated decrease bladder contractility and high bladder capacity. He appears to have an underactive bladder, likely secondary to chronic bladder outlet obstruction vs diabetic cystopathy. \par \par He underwent a ThuLEP on 8/11/22. Procedure uncomplicated. He developed self-limited chest pain with negative cardiac evaluation post-op. He developed severe back pain with paresthesias that resolved with negative MRI and neurologic evaluation. He failed void trial POD 1, but passed POD 3. He is voiding currently,with PVR < 300 cc. We discussed expectations of surgery and reasons to be concerned. Because of his bladder damage, he will never have a completely normal bladder. However, relief of his outlet obstruction,will allow him to urinate more effectively at lower voiding pressures, which will prevent complications from JORDAN. I am not concerned regarding the elevated residuals as long he is urinating without straining.\par - F/U In one month for UA, IPSS, PVR
DISPLAY PLAN FREE TEXT

## 2023-01-06 ENCOUNTER — NON-APPOINTMENT (OUTPATIENT)
Age: 73
End: 2023-01-06

## 2023-01-06 ENCOUNTER — APPOINTMENT (OUTPATIENT)
Dept: OPHTHALMOLOGY | Facility: CLINIC | Age: 73
End: 2023-01-06
Payer: MEDICARE

## 2023-01-06 PROCEDURE — 92014 COMPRE OPH EXAM EST PT 1/>: CPT

## 2023-04-01 ENCOUNTER — EMERGENCY (EMERGENCY)
Facility: HOSPITAL | Age: 73
LOS: 1 days | Discharge: HOME | End: 2023-04-01
Attending: EMERGENCY MEDICINE | Admitting: EMERGENCY MEDICINE
Payer: MEDICARE

## 2023-04-01 VITALS
DIASTOLIC BLOOD PRESSURE: 69 MMHG | HEART RATE: 63 BPM | RESPIRATION RATE: 18 BRPM | OXYGEN SATURATION: 96 % | SYSTOLIC BLOOD PRESSURE: 100 MMHG | TEMPERATURE: 98 F

## 2023-04-01 VITALS
RESPIRATION RATE: 18 BRPM | SYSTOLIC BLOOD PRESSURE: 157 MMHG | WEIGHT: 190.04 LBS | TEMPERATURE: 98 F | OXYGEN SATURATION: 97 % | HEART RATE: 66 BPM | DIASTOLIC BLOOD PRESSURE: 80 MMHG

## 2023-04-01 LAB
ALBUMIN SERPL ELPH-MCNC: 4.3 G/DL — SIGNIFICANT CHANGE UP (ref 3.3–5)
ALP SERPL-CCNC: 53 U/L — SIGNIFICANT CHANGE UP (ref 40–120)
ALT FLD-CCNC: 19 U/L — SIGNIFICANT CHANGE UP (ref 10–45)
ANION GAP SERPL CALC-SCNC: 10 MMOL/L — SIGNIFICANT CHANGE UP (ref 5–17)
APTT BLD: 44.9 SEC — HIGH (ref 27.5–35.5)
AST SERPL-CCNC: 22 U/L — SIGNIFICANT CHANGE UP (ref 10–40)
BASOPHILS # BLD AUTO: 0.05 K/UL — SIGNIFICANT CHANGE UP (ref 0–0.2)
BASOPHILS NFR BLD AUTO: 0.9 % — SIGNIFICANT CHANGE UP (ref 0–2)
BILIRUB SERPL-MCNC: 0.6 MG/DL — SIGNIFICANT CHANGE UP (ref 0.2–1.2)
BUN SERPL-MCNC: 24 MG/DL — HIGH (ref 7–23)
CALCIUM SERPL-MCNC: 9 MG/DL — SIGNIFICANT CHANGE UP (ref 8.4–10.5)
CHLORIDE SERPL-SCNC: 105 MMOL/L — SIGNIFICANT CHANGE UP (ref 96–108)
CK MB CFR SERPL CALC: 9.3 NG/ML — HIGH (ref 0–6.7)
CK SERPL-CCNC: 273 U/L — HIGH (ref 30–200)
CO2 SERPL-SCNC: 26 MMOL/L — SIGNIFICANT CHANGE UP (ref 22–31)
CREAT SERPL-MCNC: 1.03 MG/DL — SIGNIFICANT CHANGE UP (ref 0.5–1.3)
EGFR: 78 ML/MIN/1.73M2 — SIGNIFICANT CHANGE UP
EOSINOPHIL # BLD AUTO: 0.16 K/UL — SIGNIFICANT CHANGE UP (ref 0–0.5)
EOSINOPHIL NFR BLD AUTO: 2.8 % — SIGNIFICANT CHANGE UP (ref 0–6)
GLUCOSE SERPL-MCNC: 102 MG/DL — HIGH (ref 70–99)
HCT VFR BLD CALC: 44.7 % — SIGNIFICANT CHANGE UP (ref 39–50)
HGB BLD-MCNC: 14.7 G/DL — SIGNIFICANT CHANGE UP (ref 13–17)
IMM GRANULOCYTES NFR BLD AUTO: 0.2 % — SIGNIFICANT CHANGE UP (ref 0–0.9)
INR BLD: 1.92 — HIGH (ref 0.88–1.16)
LYMPHOCYTES # BLD AUTO: 1.22 K/UL — SIGNIFICANT CHANGE UP (ref 1–3.3)
LYMPHOCYTES # BLD AUTO: 21.3 % — SIGNIFICANT CHANGE UP (ref 13–44)
MCHC RBC-ENTMCNC: 31.6 PG — SIGNIFICANT CHANGE UP (ref 27–34)
MCHC RBC-ENTMCNC: 32.9 GM/DL — SIGNIFICANT CHANGE UP (ref 32–36)
MCV RBC AUTO: 96.1 FL — SIGNIFICANT CHANGE UP (ref 80–100)
MONOCYTES # BLD AUTO: 0.57 K/UL — SIGNIFICANT CHANGE UP (ref 0–0.9)
MONOCYTES NFR BLD AUTO: 10 % — SIGNIFICANT CHANGE UP (ref 2–14)
NEUTROPHILS # BLD AUTO: 3.71 K/UL — SIGNIFICANT CHANGE UP (ref 1.8–7.4)
NEUTROPHILS NFR BLD AUTO: 64.8 % — SIGNIFICANT CHANGE UP (ref 43–77)
NRBC # BLD: 0 /100 WBCS — SIGNIFICANT CHANGE UP (ref 0–0)
NT-PROBNP SERPL-SCNC: 309 PG/ML — HIGH (ref 0–300)
PLATELET # BLD AUTO: 124 K/UL — LOW (ref 150–400)
POTASSIUM SERPL-MCNC: 4.4 MMOL/L — SIGNIFICANT CHANGE UP (ref 3.5–5.3)
POTASSIUM SERPL-SCNC: 4.4 MMOL/L — SIGNIFICANT CHANGE UP (ref 3.5–5.3)
PROT SERPL-MCNC: 7 G/DL — SIGNIFICANT CHANGE UP (ref 6–8.3)
PROTHROM AB SERPL-ACNC: 23 SEC — HIGH (ref 10.5–13.4)
RBC # BLD: 4.65 M/UL — SIGNIFICANT CHANGE UP (ref 4.2–5.8)
RBC # FLD: 14.1 % — SIGNIFICANT CHANGE UP (ref 10.3–14.5)
SARS-COV-2 RNA SPEC QL NAA+PROBE: SIGNIFICANT CHANGE UP
SODIUM SERPL-SCNC: 141 MMOL/L — SIGNIFICANT CHANGE UP (ref 135–145)
TROPONIN T SERPL-MCNC: 0.01 NG/ML — SIGNIFICANT CHANGE UP (ref 0–0.01)
WBC # BLD: 5.72 K/UL — SIGNIFICANT CHANGE UP (ref 3.8–10.5)
WBC # FLD AUTO: 5.72 K/UL — SIGNIFICANT CHANGE UP (ref 3.8–10.5)

## 2023-04-01 PROCEDURE — 82553 CREATINE MB FRACTION: CPT

## 2023-04-01 PROCEDURE — 99285 EMERGENCY DEPT VISIT HI MDM: CPT | Mod: CS

## 2023-04-01 PROCEDURE — 71045 X-RAY EXAM CHEST 1 VIEW: CPT

## 2023-04-01 PROCEDURE — 99285 EMERGENCY DEPT VISIT HI MDM: CPT | Mod: 25

## 2023-04-01 PROCEDURE — 83880 ASSAY OF NATRIURETIC PEPTIDE: CPT

## 2023-04-01 PROCEDURE — 85610 PROTHROMBIN TIME: CPT

## 2023-04-01 PROCEDURE — 84484 ASSAY OF TROPONIN QUANT: CPT

## 2023-04-01 PROCEDURE — 36415 COLL VENOUS BLD VENIPUNCTURE: CPT

## 2023-04-01 PROCEDURE — 71045 X-RAY EXAM CHEST 1 VIEW: CPT | Mod: 26

## 2023-04-01 PROCEDURE — 85025 COMPLETE CBC W/AUTO DIFF WBC: CPT

## 2023-04-01 PROCEDURE — 80053 COMPREHEN METABOLIC PANEL: CPT

## 2023-04-01 PROCEDURE — 87635 SARS-COV-2 COVID-19 AMP PRB: CPT

## 2023-04-01 PROCEDURE — 85730 THROMBOPLASTIN TIME PARTIAL: CPT

## 2023-04-01 PROCEDURE — 82550 ASSAY OF CK (CPK): CPT

## 2023-04-01 NOTE — ED ADULT NURSE NOTE - CAS EDN DISCHARGE ASSESSMENT
Alert and oriented to person, place and time Z Plasty Text: The lesion was extirpated to the level of the fat with a #15 scalpel blade.  Given the location of the defect, shape of the defect and the proximity to free margins a Z-plasty was deemed most appropriate for repair.  Using a sterile surgical marker, the appropriate transposition arms of the Z-plasty were drawn incorporating the defect and placing the expected incisions within the relaxed skin tension lines where possible.    The area thus outlined was incised deep to adipose tissue with a #15 scalpel blade.  The skin margins were undermined to an appropriate distance in all directions utilizing iris scissors.  The opposing transposition arms were then transposed into place in opposite direction and anchored with interrupted buried subcutaneous sutures.

## 2023-04-01 NOTE — ED PROVIDER NOTE - NSFOLLOWUPINSTRUCTIONS_ED_ALL_ED_FT
CHEST PAIN - General Information    Chest Pain    WHAT YOU NEED TO KNOW:    What do I need to know about chest pain? Chest pain can be caused by a range of conditions, from not serious to life-threatening. It is important to follow up with your healthcare provider to find the cause of your chest pain.    What may cause or increase my risk for chest pain?    A digestion problem, such as acid reflux or a stomach ulcer    An anxiety attack or a strong emotion, such as anger    Infection, inflammation, or a fracture in the bones or cartilage in your chest    Poor blood flow to your heart (angina)    A life-threatening condition, such as a heart attack or blood clot in your lungs  What other symptoms might I have with chest pain?    A burning feeling behind your breastbone    A racing or slow heartbeat    Fever or sweating    Nausea or vomiting    Shortness of breath    Discomfort or pressure that spreads from your chest to your back, jaw, or arm    Feeling weak, tired, or faint  How is the cause of chest pain diagnosed? Your healthcare provider will examine you. Describe your chest pain in as much detail as possible. Tell him or her where your pain is and when it began. Tell the provider if you notice anything that makes the pain worse or better. Tell him or her if it is constant or comes and goes. Also include any other symptoms you have with the chest pain, such as sweating or nausea. Your provider will ask about any medicines you use and medical conditions you have. Tell him or her if you have a family history of heart disease. You may also need any of the following tests:    An EKG is a test that records your heart's electrical activity.    Blood tests check for heart damage and signs of a heart attack.    An echocardiogram uses sound waves to see if blood is flowing normally through your heart.    An ultrasound, x-ray, CT, or MRI scan may show the cause of your chest pain. You may be given contrast liquid to help your heart show up better in the pictures. Tell the healthcare provider if you have ever had an allergic reaction to contrast liquid. Do not enter the MRI room with anything metal. Metal can cause serious injury. Tell the provider if you have any metal in or on your body.    An endoscopy may be done to check for ulcers or problem with your esophagus.  Upper Endoscopy  How is chest pain treated?    Medicines may be given to treat the cause of your chest pain. Examples include pain medicine, anxiety medicine, or medicines to increase blood flow to your heart. Do not take certain medicines without asking your healthcare provider first. These include NSAIDs, herbal or vitamin supplements, and hormones, such as estrogen or progestin.    A stent may be placed if your chest pain is caused by blockage in your heart. A stent is a wire mesh tube that helps hold your artery open. You may need more than 1 stent.  Coronary Artery Angioplasty (Stent)  What are some healthy living tips? If the cause of your chest pain is known, your healthcare provider will give you specific guidelines to follow. The following are general healthy guidelines:    Do not smoke. Nicotine and other chemicals in cigarettes and cigars can cause lung and heart damage. Ask your healthcare provider for information if you currently smoke and need help to quit. E-cigarettes or smokeless tobacco still contain nicotine. Talk to your healthcare provider before you use these products.    Choose a variety of healthy foods as often as possible. Include fresh, frozen, or canned fruits and vegetables. Also include low-fat dairy products, fish, chicken (without skin), and lean meats. Your healthcare provider or a dietitian can help you create meal plans. You may need to avoid certain foods or drinks if your pain is caused by a digestion problem.  Healthy Foods      Lower your sodium (salt) intake. Limit foods that are high in sodium, such as canned foods, salty snacks, and cold cuts. If you add salt when you cook food, do not add more at the table. Choose low-sodium canned foods as much as possible.        Drink plenty of water every day. Water helps your body to control your temperature and blood pressure. Ask your healthcare provider how much water you should drink every day.    Ask about activity. Your healthcare provider will tell you which activities to limit or avoid. Ask when you can drive, return to work, and have sex. Ask about the best exercise plan for you.    Maintain a healthy weight. Ask your healthcare provider what a healthy weight is for you. Ask him or her to help you create a weight loss plan if you are overweight.    Ask about vaccines you may need. Your healthcare provider can tell you which vaccines you need, and when to get them. The following vaccines help prevent diseases that can become serious for a person with a heart condition:  The influenza (flu) vaccine is given each year. Get a flu vaccine as soon as recommended, usually in September or October.    The pneumonia vaccine is usually given every 5 years. Your healthcare provider may recommend the pneumonia vaccine if you are 65 or older.    COVID-19 vaccines are given to adults as a shot in 1 or 2 doses. Vaccination is recommended for all adults. A booster (additional) dose is also recommended for all adults. A second booster is recommended for all adults 50 or older and for immunocompromised adults 18 or older. The second booster is also recommended for adults who received the 1-dose vaccine for the first and booster doses. Your healthcare provider can tell you when to get one or both boosters.  Prevent Heart Disease   Call your local emergency number (911 in the [place]) or have someone call if:    You have any of the following signs of a heart attack:  Squeezing, pressure, or pain in your chest    You may also have any of the following:  Discomfort or pain in your back, neck, jaw, stomach, or arm    Shortness of breath    Nausea or vomiting    Lightheadedness or a sudden cold sweat    When should I seek immediate care?    You have chest discomfort that gets worse, even with medicine.    You cough or vomit blood.    Your bowel movements are black or bloody.    You cannot stop vomiting, or it hurts to swallow.  When should I call my doctor?    You have questions or concerns about your condition or care.    CARE AGREEMENT:    You have the right to help plan your care. Learn about your health condition and how it may be treated. Discuss treatment options with your healthcare providers to decide what care you want to receive. You always have the right to refuse treatment.    © [employer] 1973, 2023    	  back to top            © [employer] 1973, 2023

## 2023-04-01 NOTE — ED PROVIDER NOTE - PATIENT PORTAL LINK FT
You can access the FollowMyHealth Patient Portal offered by [facility] by registering at the following website: [email or web address]. By joining [employer]’s FollowMyHealth portal, you will also be able to view your health information using other applications (apps) compatible with our system.

## 2023-04-01 NOTE — ED PROVIDER NOTE - CLINICAL SUMMARY MEDICAL DECISION MAKING FREE TEXT BOX
CP for weeks- nonexertional- no assoc sob/n/v/dizziness - ho cad- ekg, RBBB nonischemic CP for weeks- nonexertional- no assoc sob/n/v/dizziness - ho cad- ekg, RBBB nonischemic  chronic cp neg trop- px with risk factors but pain for several weeks- doubt acute ischemia- joint decision making- wants to go home- rec fu w Dr Palmer on Monday

## 2023-04-01 NOTE — ED PROVIDER NOTE - HIV OFFER
Patient is cancelling appointment on 04/14/20 will call back to reschedule      Patient phone number [phone]  
Previously Declined (within the last year)

## 2023-04-01 NOTE — ED PROVIDER NOTE - OBJECTIVE STATEMENT
72 M ho high lipids, htn cad stent x 2 vascular dz R leg stent recently- co crushing chest pain- pain across his chest- lasts a few hours- frequently over the past few weeks- no sob- px also w afib on asa and xarelto no ho pe/dvt  mod severity  no sig exac/allev factors  pain is nonexertional

## 2023-04-01 NOTE — ED ADULT TRIAGE NOTE - CHIEF COMPLAINT QUOTE
Pt c/o chest pain x 2 days, worsening over past 2 hours. Hx of 2x cardiac stents. Pt c/o chest pain x 2 days, worsening over past hour. Hx of 2x cardiac stents.

## 2023-04-01 NOTE — ED ADULT NURSE NOTE - DISCHARGE DATE/TIME
Reviewed. Continue to monitor.
[employer] 
01-Apr-2023 18:59

## 2023-04-01 NOTE — ED ADULT NURSE NOTE - OBJECTIVE STATEMENT
Patient came to ER stating "I have had chest pain for a while but the last few days it has gotten worse". Patient denies dizziness, weakness, NVD. Patient c/o "I would please like oxygen". Patient's O2 saturation at 96-97% on RA.

## 2023-04-01 NOTE — ED PROVIDER NOTE - CONSIDERATION OF ADMISSION OBSERVATION
consider admit for [place]- last cardiac pruitt was 1 yr ago- had neg nuc stress Consideration of Admission/Observation

## 2023-04-03 DIAGNOSIS — I45.10 UNSPECIFIED RIGHT BUNDLE-BRANCH BLOCK: ICD-10-CM

## 2023-04-03 DIAGNOSIS — I48.91 UNSPECIFIED ATRIAL FIBRILLATION: ICD-10-CM

## 2023-04-03 DIAGNOSIS — R07.89 OTHER CHEST PAIN: ICD-10-CM

## 2023-04-03 DIAGNOSIS — I10 ESSENTIAL (PRIMARY) HYPERTENSION: ICD-10-CM

## 2023-04-03 DIAGNOSIS — Z20.822 CONTACT WITH AND (SUSPECTED) EXPOSURE TO COVID-19: ICD-10-CM

## 2023-04-03 DIAGNOSIS — Z79.84 LONG TERM (CURRENT) USE OF ORAL HYPOGLYCEMIC DRUGS: ICD-10-CM

## 2023-04-03 DIAGNOSIS — Z95.5 PRESENCE OF CORONARY ANGIOPLASTY IMPLANT AND GRAFT: ICD-10-CM

## 2023-04-03 DIAGNOSIS — Z87.891 PERSONAL HISTORY OF NICOTINE DEPENDENCE: ICD-10-CM

## 2023-04-03 DIAGNOSIS — I25.10 ATHEROSCLEROTIC HEART DISEASE OF NATIVE CORONARY ARTERY WITHOUT ANGINA PECTORIS: ICD-10-CM

## 2023-04-03 DIAGNOSIS — Z79.02 LONG TERM (CURRENT) USE OF ANTITHROMBOTICS/ANTIPLATELETS: ICD-10-CM

## 2023-04-03 DIAGNOSIS — Z79.82 LONG TERM (CURRENT) USE OF ASPIRIN: ICD-10-CM

## 2023-04-03 DIAGNOSIS — E78.00 PURE HYPERCHOLESTEROLEMIA, UNSPECIFIED: ICD-10-CM

## 2023-04-03 DIAGNOSIS — Z79.01 LONG TERM (CURRENT) USE OF ANTICOAGULANTS: ICD-10-CM

## 2023-04-05 ENCOUNTER — NON-APPOINTMENT (OUTPATIENT)
Age: 73
End: 2023-04-05

## 2023-04-05 ENCOUNTER — APPOINTMENT (OUTPATIENT)
Dept: CARDIOLOGY | Facility: CLINIC | Age: 73
End: 2023-04-05
Payer: MEDICARE

## 2023-04-05 ENCOUNTER — TRANSCRIPTION ENCOUNTER (OUTPATIENT)
Age: 73
End: 2023-04-05

## 2023-04-05 VITALS
DIASTOLIC BLOOD PRESSURE: 61 MMHG | HEIGHT: 68 IN | SYSTOLIC BLOOD PRESSURE: 153 MMHG | WEIGHT: 200 LBS | BODY MASS INDEX: 30.31 KG/M2 | OXYGEN SATURATION: 100 % | HEART RATE: 59 BPM

## 2023-04-05 DIAGNOSIS — I25.10 ATHEROSCLEROTIC HEART DISEASE OF NATIVE CORONARY ARTERY W/OUT ANGINA PECTORIS: ICD-10-CM

## 2023-04-05 PROCEDURE — 93000 ELECTROCARDIOGRAM COMPLETE: CPT

## 2023-04-05 PROCEDURE — 99204 OFFICE O/P NEW MOD 45 MIN: CPT

## 2023-04-05 NOTE — PHYSICAL EXAM
[General Appearance - Well Developed] : well developed [Normal Appearance] : normal appearance [Well Groomed] : well groomed [General Appearance - Well Nourished] : well nourished [No Deformities] : no deformities [General Appearance - In No Acute Distress] : no acute distress [Normal Conjunctiva] : the conjunctiva exhibited no abnormalities [Eyelids - No Xanthelasma] : the eyelids demonstrated no xanthelasmas [Normal Oral Mucosa] : normal oral mucosa [No Oral Pallor] : no oral pallor [No Oral Cyanosis] : no oral cyanosis [Normal Jugular Venous A Waves Present] : normal jugular venous A waves present [Normal Jugular Venous V Waves Present] : normal jugular venous V waves present [No Jugular Venous Reynolds A Waves] : no jugular venous reynolds A waves [Heart Rate And Rhythm] : heart rate and rhythm were normal [Heart Sounds] : normal S1 and S2 [Murmurs] : no murmurs present [Respiration, Rhythm And Depth] : normal respiratory rhythm and effort [Exaggerated Use Of Accessory Muscles For Inspiration] : no accessory muscle use [Auscultation Breath Sounds / Voice Sounds] : lungs were clear to auscultation bilaterally [Abdomen Soft] : soft [Abdomen Tenderness] : non-tender [] : no hepato-splenomegaly [Abdomen Mass (___ Cm)] : no abdominal mass palpated [Abnormal Walk] : normal gait [Gait - Sufficient For Exercise Testing] : the gait was sufficient for exercise testing

## 2023-04-05 NOTE — REASON FOR VISIT
[Symptom and Test Evaluation] : symptom and test evaluation [Consultation] : a consultation regarding [Atrial Fibrillation] : atrial fibrillation [Coronary Artery Disease] : coronary artery disease

## 2023-04-05 NOTE — HISTORY OF PRESENT ILLNESS
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  He went into a fib in 2016 and was not put on anticoagulation.  He had a CTA in 2017 which showed a 50-70% proximal stenosis.  He was seen by a physician who had recommended a cath which he did not do because his mom was very sick.    He has had swelling in the legs and was started on lasix.  He had a cath in 2020 and had multivessel pci to the lad and om1.  He did very well for 3 years but has noticed chest discomfort on minimal exertion.  This is similar to his sxs prior to pci.

## 2023-04-06 ENCOUNTER — OUTPATIENT (OUTPATIENT)
Dept: OUTPATIENT SERVICES | Facility: HOSPITAL | Age: 73
LOS: 1 days | End: 2023-04-06
Payer: MEDICARE

## 2023-04-06 ENCOUNTER — APPOINTMENT (OUTPATIENT)
Dept: CV DIAGNOSTICS | Facility: HOSPITAL | Age: 73
End: 2023-04-06

## 2023-04-06 DIAGNOSIS — I25.10 ATHEROSCLEROTIC HEART DISEASE OF NATIVE CORONARY ARTERY WITHOUT ANGINA PECTORIS: ICD-10-CM

## 2023-04-06 PROCEDURE — 93018 CV STRESS TEST I&R ONLY: CPT

## 2023-04-06 PROCEDURE — 93017 CV STRESS TEST TRACING ONLY: CPT

## 2023-04-06 PROCEDURE — 78452 HT MUSCLE IMAGE SPECT MULT: CPT | Mod: 26,MH

## 2023-04-06 PROCEDURE — 93016 CV STRESS TEST SUPVJ ONLY: CPT

## 2023-04-06 PROCEDURE — 93306 TTE W/DOPPLER COMPLETE: CPT | Mod: 26

## 2023-04-06 PROCEDURE — A9500: CPT

## 2023-04-06 PROCEDURE — 78452 HT MUSCLE IMAGE SPECT MULT: CPT | Mod: MH

## 2023-04-06 PROCEDURE — 93306 TTE W/DOPPLER COMPLETE: CPT

## 2023-04-07 ENCOUNTER — NON-APPOINTMENT (OUTPATIENT)
Age: 73
End: 2023-04-07

## 2023-04-14 ENCOUNTER — TRANSCRIPTION ENCOUNTER (OUTPATIENT)
Age: 73
End: 2023-04-14

## 2023-04-14 ENCOUNTER — OUTPATIENT (OUTPATIENT)
Dept: OUTPATIENT SERVICES | Facility: HOSPITAL | Age: 73
LOS: 1 days | End: 2023-04-14
Payer: MEDICARE

## 2023-04-14 VITALS
HEART RATE: 62 BPM | TEMPERATURE: 98 F | HEIGHT: 69 IN | OXYGEN SATURATION: 96 % | DIASTOLIC BLOOD PRESSURE: 64 MMHG | RESPIRATION RATE: 18 BRPM | WEIGHT: 190.04 LBS | SYSTOLIC BLOOD PRESSURE: 141 MMHG

## 2023-04-14 VITALS
DIASTOLIC BLOOD PRESSURE: 65 MMHG | HEART RATE: 62 BPM | RESPIRATION RATE: 16 BRPM | OXYGEN SATURATION: 98 % | SYSTOLIC BLOOD PRESSURE: 115 MMHG

## 2023-04-14 DIAGNOSIS — Z98.890 OTHER SPECIFIED POSTPROCEDURAL STATES: Chronic | ICD-10-CM

## 2023-04-14 DIAGNOSIS — R94.39 ABNORMAL RESULT OF OTHER CARDIOVASCULAR FUNCTION STUDY: ICD-10-CM

## 2023-04-14 DIAGNOSIS — Z98.62 PERIPHERAL VASCULAR ANGIOPLASTY STATUS: Chronic | ICD-10-CM

## 2023-04-14 DIAGNOSIS — S98.139A COMPLETE TRAUMATIC AMPUTATION OF ONE UNSPECIFIED LESSER TOE, INITIAL ENCOUNTER: Chronic | ICD-10-CM

## 2023-04-14 LAB
ALBUMIN SERPL ELPH-MCNC: 4.4 G/DL — SIGNIFICANT CHANGE UP (ref 3.3–5)
ALP SERPL-CCNC: 47 U/L — SIGNIFICANT CHANGE UP (ref 40–120)
ALT FLD-CCNC: 20 U/L — SIGNIFICANT CHANGE UP (ref 10–45)
ANION GAP SERPL CALC-SCNC: 8 MMOL/L — SIGNIFICANT CHANGE UP (ref 5–17)
AST SERPL-CCNC: 22 U/L — SIGNIFICANT CHANGE UP (ref 10–40)
BILIRUB SERPL-MCNC: 0.7 MG/DL — SIGNIFICANT CHANGE UP (ref 0.2–1.2)
BUN SERPL-MCNC: 21 MG/DL — SIGNIFICANT CHANGE UP (ref 7–23)
CALCIUM SERPL-MCNC: 9.1 MG/DL — SIGNIFICANT CHANGE UP (ref 8.4–10.5)
CHLORIDE SERPL-SCNC: 106 MMOL/L — SIGNIFICANT CHANGE UP (ref 96–108)
CO2 SERPL-SCNC: 29 MMOL/L — SIGNIFICANT CHANGE UP (ref 22–31)
CREAT SERPL-MCNC: 0.87 MG/DL — SIGNIFICANT CHANGE UP (ref 0.5–1.3)
EGFR: 92 ML/MIN/1.73M2 — SIGNIFICANT CHANGE UP
GLUCOSE BLDC GLUCOMTR-MCNC: 125 MG/DL — HIGH (ref 70–99)
GLUCOSE SERPL-MCNC: 129 MG/DL — HIGH (ref 70–99)
HCT VFR BLD CALC: 42.8 % — SIGNIFICANT CHANGE UP (ref 39–50)
HGB BLD-MCNC: 14.1 G/DL — SIGNIFICANT CHANGE UP (ref 13–17)
MCHC RBC-ENTMCNC: 31.5 PG — SIGNIFICANT CHANGE UP (ref 27–34)
MCHC RBC-ENTMCNC: 32.9 GM/DL — SIGNIFICANT CHANGE UP (ref 32–36)
MCV RBC AUTO: 95.7 FL — SIGNIFICANT CHANGE UP (ref 80–100)
NRBC # BLD: 0 /100 WBCS — SIGNIFICANT CHANGE UP (ref 0–0)
PLATELET # BLD AUTO: 127 K/UL — LOW (ref 150–400)
POTASSIUM SERPL-MCNC: 4.2 MMOL/L — SIGNIFICANT CHANGE UP (ref 3.5–5.3)
POTASSIUM SERPL-SCNC: 4.2 MMOL/L — SIGNIFICANT CHANGE UP (ref 3.5–5.3)
PROT SERPL-MCNC: 7.1 G/DL — SIGNIFICANT CHANGE UP (ref 6–8.3)
RBC # BLD: 4.47 M/UL — SIGNIFICANT CHANGE UP (ref 4.2–5.8)
RBC # FLD: 14.1 % — SIGNIFICANT CHANGE UP (ref 10.3–14.5)
SODIUM SERPL-SCNC: 143 MMOL/L — SIGNIFICANT CHANGE UP (ref 135–145)
WBC # BLD: 6.49 K/UL — SIGNIFICANT CHANGE UP (ref 3.8–10.5)
WBC # FLD AUTO: 6.49 K/UL — SIGNIFICANT CHANGE UP (ref 3.8–10.5)

## 2023-04-14 PROCEDURE — 93454 CORONARY ARTERY ANGIO S&I: CPT

## 2023-04-14 PROCEDURE — 80053 COMPREHEN METABOLIC PANEL: CPT

## 2023-04-14 PROCEDURE — C1887: CPT

## 2023-04-14 PROCEDURE — C1894: CPT

## 2023-04-14 PROCEDURE — 93010 ELECTROCARDIOGRAM REPORT: CPT

## 2023-04-14 PROCEDURE — 85027 COMPLETE CBC AUTOMATED: CPT

## 2023-04-14 PROCEDURE — 82962 GLUCOSE BLOOD TEST: CPT

## 2023-04-14 PROCEDURE — 99152 MOD SED SAME PHYS/QHP 5/>YRS: CPT

## 2023-04-14 PROCEDURE — 93005 ELECTROCARDIOGRAM TRACING: CPT

## 2023-04-14 PROCEDURE — C1769: CPT

## 2023-04-14 PROCEDURE — 93454 CORONARY ARTERY ANGIO S&I: CPT | Mod: 26

## 2023-04-14 RX ORDER — ATORVASTATIN CALCIUM 80 MG/1
1 TABLET, FILM COATED ORAL
Qty: 0 | Refills: 0 | DISCHARGE

## 2023-04-14 RX ORDER — METFORMIN HYDROCHLORIDE 850 MG/1
1 TABLET ORAL
Qty: 0 | Refills: 0 | DISCHARGE

## 2023-04-14 RX ORDER — ATORVASTATIN CALCIUM 80 MG/1
1 TABLET, FILM COATED ORAL
Refills: 0 | DISCHARGE

## 2023-04-14 RX ORDER — SODIUM CHLORIDE 9 MG/ML
1000 INJECTION INTRAMUSCULAR; INTRAVENOUS; SUBCUTANEOUS
Refills: 0 | Status: COMPLETED | OUTPATIENT
Start: 2023-04-14 | End: 2023-04-14

## 2023-04-14 RX ORDER — FINASTERIDE 5 MG/1
1 TABLET, FILM COATED ORAL
Refills: 0 | DISCHARGE

## 2023-04-14 RX ORDER — RIVAROXABAN 15 MG-20MG
1 KIT ORAL
Qty: 0 | Refills: 0 | DISCHARGE

## 2023-04-14 RX ORDER — NYSTATIN CREAM 100000 [USP'U]/G
1 CREAM TOPICAL
Qty: 1 | Refills: 0
Start: 2023-04-14 | End: 2023-04-20

## 2023-04-14 RX ORDER — SODIUM CHLORIDE 9 MG/ML
250 INJECTION INTRAMUSCULAR; INTRAVENOUS; SUBCUTANEOUS ONCE
Refills: 0 | Status: COMPLETED | OUTPATIENT
Start: 2023-04-14 | End: 2023-04-14

## 2023-04-14 RX ORDER — RAMIPRIL 5 MG
1 CAPSULE ORAL
Qty: 0 | Refills: 0 | DISCHARGE

## 2023-04-14 RX ADMIN — SODIUM CHLORIDE 500 MILLILITER(S): 9 INJECTION INTRAMUSCULAR; INTRAVENOUS; SUBCUTANEOUS at 08:28

## 2023-04-14 RX ADMIN — SODIUM CHLORIDE 50 MILLILITER(S): 9 INJECTION INTRAMUSCULAR; INTRAVENOUS; SUBCUTANEOUS at 10:15

## 2023-04-14 NOTE — H&P CARDIOLOGY - ADDITIONAL PE
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

## 2023-04-14 NOTE — H&P CARDIOLOGY - TIME:
07:17 [FreeTextEntry1] : annual wellness visit [de-identified] : 72-year-old female presenting for her annual wellness visit.  Patient is present with daughter and her daughter states that she fell yesterday.  She been sitting for a long while and had some numbness and tingling in her left leg and when she stood up she fell.  The numbness and tingling quickly resolved but she has had some residual left shoulder pain.  She did not hit her head.\par Patient continues to complain of vaginal itching\par Patient had abnormal potassium at her last visit and this should be repeated today\par Patient is also had some decreased hearing.\par Patient is due for DEXA and mammogram.\par She had been following with dentistry but was given an insert to put in her mouth and reported that it was uncomfortable so she stopped using it and never returned.\par Patient has not had any intimate partners, does not smoke, does not drink and mental health is good\par She will be traveling to [place] for about 6 weeks

## 2023-04-14 NOTE — ASU DISCHARGE PLAN (ADULT/PEDIATRIC) - CARE PROVIDER_API CALL
Luigi Cabrera)  Cardiovascular Disease; Interventional Cardiology  [address] [place] [address]  Phone: [phone]  Fax: [phone]  Follow Up Time: 2 weeks

## 2023-04-14 NOTE — H&P CARDIOLOGY - NSICDXPASTMEDICALHX_GEN_ALL_CORE_FT
PAST MEDICAL HISTORY:  Atrial fibrillation     CAD (coronary artery disease)     Diabetes 1.5, managed as type 2     HLD (hyperlipidemia)     HTN (hypertension)     PVD (peripheral vascular disease)      PAST MEDICAL HISTORY:  Atrial fibrillation     CAD (coronary artery disease)     Diabetes 1.5, managed as type 2     H/O varicose veins     HLD (hyperlipidemia)     HTN (hypertension)     PAD (peripheral artery disease)     PVD (peripheral vascular disease)     Skin ulcer of ankle     Skin ulcer of left great toe

## 2023-04-14 NOTE — ASU PATIENT PROFILE, ADULT - FALL HARM RISK - HARM RISK INTERVENTIONS

## 2023-04-14 NOTE — H&P CARDIOLOGY - HISTORY OF PRESENT ILLNESS
This is a 72 year old male PMH of CAD ( 2020 s/p multivessel PCI to LAD and OM1- see cath report below), HTN, HLD, A fib dx 2016 (on Xarelto, last dose 4/12 AM), and prostatitis.  Seen and evaluated by Dr Noriega for c/c of chest "discomfort" on exertion; Had abnormal NST on 4/6/2023, presents here today for [facility] for further evaluation.     < from: Cardiac Cath Lab - Adult (01.17.20 @ 16:54) >  CORONARY VESSELS: The coronary circulation is right dominant.  LM:   --  LM: Angiography showed minor luminal irregularities with no flow  limiting lesions.  LAD:   --  Mid LAD: There was a 90 % stenosis.  CX:   --  OM1: There was a 90 % stenosis.  RCA:   --  RCA: Angiography showed minor luminal irregularities with no  flow limiting lesions.  COMPLICATIONS: There were no complications.  DIAGNOSTIC RECOMMENDATIONS: ASA and Plavix for 1 year.  INTERVENTIONAL RECOMMENDATIONS: ASA and Plavix for 1 year.  Prepared and signed by  Luigi Cabrera M.D.    < end of copied text >      < from: Nuclear Stress Test-Pharmacologic (04.06.23 @ 10:57) >  ------------------------------------------------------------------------  GATED ANALYSIS:  Post-stress gated wall motion analysis was performed (LVEF  = 54 %;LVEDV = 154 ml.) revealing hypokinesis of the basal  to mid inferolateral, basal to mid inferior, basal  inferoseptal, and apical walls.  ------------------------------------------------------------------------  IMPRESSIONS:Abnormal Study  * HR Response: Appropriate; BP Response: Appropriate.  * Heart Rhythm: Atrial Fibrillation - 52 BPM.  * Conduction defects: right bundle branch block.  * Baseline ECG: Nonspecific ST-T wave abnormality.  * ECG Changes: No significant ischemic ST segment changes  beyond baseline abnormalities.  * The left ventricle was enlarged.  * There are large, mild to moderate defects in the  anterior and basal to mid anterolateral walls that are  reversible suggestive of ischemia.  * There is a small, moderate defect in the apex that is  partially reversible suggestive of infarct with mild to  moderate pierre-infarct ischemia.  * There are small, mild defects in the distal inferior and  distal inferoseptal walls that are partially reversible  suggestive of mild ischemia with partial scarring.  * There are medium-sized, mild defects in the basal to mid  inferior, basal to mid inferolateral, and basal  inferoseptal walls that are mostly fixed suggestive of  infarct with mild pierre-infarct ischemia.  * The patient was unable to perform prone imaging.  * Post-stress gated wall motion analysis was performed  (LVEF = 54 %;LVEDV = 154 ml.) revealing hypokinesis of the  basal to mid inferolateral, basal to mid inferior, basal  inferoseptal, and apical walls.  *** No previous Nuclear/Stress exam.  ADDENDUM 4/6/2023: To clarify radiopharmaceutical  doses/times    < end of copied text >    < from: TTE W or WO Ultrasound Enhancing Agent (04.06.23 @ 14:54) >  CONCLUSIONS:      1. Normal left ventricular cavity size. The left ventricular wall thickness is normal. The left ventricular systolic function is normal with an ejection fraction of 74 % by Faye's method of disks. There are no regional wall motion abnormalities seen.   2. There is normal left ventricular diastolic function.   3. Normal right ventricular cavity size, normal wall thickness and normal systolic function. The tricuspid annular plane systolic excursion (TAPSE) is 2.3 cm (normal >=1.7 cm).   4. The aortic valve is tricuspid with normal structure without stenosis. There is focal calcification of the aortic valve leaflets. There is fibrocalcific aortic valve sclerosis without stenosis. The aortic peak velocity is 2.1 m/s. There is mild-to-moderate aortic regurgitation.   5. There is trace mitral and mild tricuspid regurgitation.   6. Pulmonary artery systolic pressure could not be estimated.   7. No pericardial effusion seen.   8. Compared to the transthoracic echocardiogram performed on 3/22/2022 there have been no significant interval changes.    ________________________________________________________________________________________  FINDINGS:  Left Ventricle:  Normal left ventricular cavity size. The left ventricular wall thickness is normal. The left ventricular systolic function is normal with a calculated ejection fraction of 74 % by the Faye's biplane method of disks. There are no regional wall motion abnormalities seen. There is normal left ventricular diastolic function, with normal filling pressure.     Right Ventricle:  Normal right ventricular cavity size, normal wall thickness and normal systolic function. Tricuspidannular plane systolic excursion (TAPSE) is 2.3 cm (normal >=1.7 cm).     Right Atrium:  The right atrium is severely dilated with an indexed volume of 40.36 ml/m² and an indexed area of 12.62 cm²/m².     Aortic Valve:  The aortic valve is tricuspid with normal structure without stenosis. There is focal calcification of the aortic valve leaflets. There is fibrocalcific aortic valve sclerosis without stenosis. The aortic peak velocity is 2.1 m/s. There is mild-to-moderate aortic regurgitation. AI VMax is 4.00 m/s. AI pressure half time is 559 msec. AI slope is 2.05 m/s².     Mitral Valve:  Structurally normal mitral valve with normal leaflet excursion. There is trace mitral regurgitation.     Tricuspid Valve:  Structurally normal tricuspid valve with normal leaflet excursion. There is mild tricuspid regurgitation. There is insufficient tricuspid regurgitation detected to calculate pulmonary artery systolic pressure.     Pulmonic Valve:  Structurally normal pulmonic valve with normal leaflet excursion. There is mild pulmonic regurgitation.     Aorta:  The aortic annulus and aortic root appear normal in size.     Pericardium:  No pericardial effusion seen.  ____________________________________________________________________  QUANTITATIVE DATA  Left Ventricle Measurements                         Indexed to BSA  IVSd (2D):   1.8 cm  LVPWd (2D):  1.3 cm  LVIDd (2D):  4.6 cm  LVIDs (2D):  2.5 cm  LV Mass:     310 g  151.8 g/m²  LV Vol d, A2C: 58.8 ml 28.77 ml/m²  LV Vol d, A4C: 88.9 ml 43.49 ml/m²  LV Vol d, BP:  73.4 ml 35.90 ml/m²  LV Vol s, A2C: 14.4 ml 7.05 ml/m²  LV Vol s, A4C: 20.9 ml 10.23 ml/m²  LV Vol s, BP:  18.8 ml 9.21 ml/m²  LV SV BP:      54.5 ml  LV EF BP:      74 %     MV E Vmax:    0.92 m/s  e' lateral:   14.00 cm/s  e' medial:    9.00 cm/s  E/e' lateral: 6.59  E/e' medial:  10.24  E/e' Average: 8.02    Aorta Measurements     Ao Root at STJ: 3.5 cm       Left Atrium Measurements     LA Diam 2D: 6.30    Right Ventricle Measurements Right Atrial Measurements     TAPSE: 2.3 cm                RA Vol:       82.50 ml                               RA Vol Index: 40.36 ml/m²    LVOT / RVOT/ Qp/Qs Data:  LVOT Diameter:   2.20 cm  LVOT Vmax:       1.23 m/s  LVOT VTI:        25.70 cm  LVOT SV:         97.7 ml  LVOT SV Indexed: 47.80 ml/m²  RVOT VTI:        18.03 cm    Mitral Valve Measurements     MV E Vmax: 0.9 m/s    < end of copied text >   This is a 72 year old male PMH of CAD ( 2020 s/p multivessel PCI to LAD and OM1- see cath report below), HTN, HLD, A fib dx 2016 (on Xarelto, last dose 4/12 AM), DM type 2 ( well managed on Metformin as per pt, w/o complications, last A1C unknown), PVD/PAD s/p peripheral stent to Right leg ( as per pt, x8 months ago done at [place]- on ASA only, no Plavix), + ruslan lower extremities varicose veins, 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    Seen and evaluated by Dr Noriega for c/c of chest "discomfort" on exertion; Had abnormal NST on 4/6/2023, presents here today for [facility] for further evaluation.       OF nOTE: - pin point, pink colored, raised rash noted on chest with some areas of scabbing, pt denies any discomfort, urticaria or pain at site, denies from noticing rash before this am.   - sacral non blanchable redness noted also on exam, pt reports no discomfort to the area, also unaware of redness prior to today  please see "physical exam" section, for more detailed information on dermatological findings.         < from: Cardiac Cath Lab - Adult (01.17.20 @ 16:54) >  CORONARY VESSELS: The coronary circulation is right dominant.  LM:   --  LM: Angiography showed minor luminal irregularities with no flow  limiting lesions.  LAD:   --  Mid LAD: There was a 90 % stenosis.  CX:   --  OM1: There was a 90 % stenosis.  RCA:   --  RCA: Angiography showed minor luminal irregularities with no  flow limiting lesions.  COMPLICATIONS: There were no complications.  DIAGNOSTIC RECOMMENDATIONS: ASA and Plavix for 1 year.  INTERVENTIONAL RECOMMENDATIONS: ASA and Plavix for 1 year.  Prepared and signed by  Luigi Cabrera M.D.    < end of copied text >      < from: Nuclear Stress Test-Pharmacologic (04.06.23 @ 10:57) >  ------------------------------------------------------------------------  GATED ANALYSIS:  Post-stress gated wall motion analysis was performed (LVEF  = 54 %;LVEDV = 154 ml.) revealing hypokinesis of the basal  to mid inferolateral, basal to mid inferior, basal  inferoseptal, and apical walls.  ------------------------------------------------------------------------  IMPRESSIONS:Abnormal Study  * HR Response: Appropriate; BP Response: Appropriate.  * Heart Rhythm: Atrial Fibrillation - 52 BPM.  * Conduction defects: right bundle branch block.  * Baseline ECG: Nonspecific ST-T wave abnormality.  * ECG Changes: No significant ischemic ST segment changes  beyond baseline abnormalities.  * The left ventricle was enlarged.  * There are large, mild to moderate defects in the  anterior and basal to mid anterolateral walls that are  reversible suggestive of ischemia.  * There is a small, moderate defect in the apex that is  partially reversible suggestive of infarct with mild to  moderate pierre-infarct ischemia.  * There are small, mild defects in the distal inferior and  distal inferoseptal walls that are partially reversible  suggestive of mild ischemia with partial scarring.  * There are medium-sized, mild defects in the basal to mid  inferior, basal to mid inferolateral, and basal  inferoseptal walls that are mostly fixed suggestive of  infarct with mild pierre-infarct ischemia.  * The patient was unable to perform prone imaging.  * Post-stress gated wall motion analysis was performed  (LVEF = 54 %;LVEDV = 154 ml.) revealing hypokinesis of the  basal to mid inferolateral, basal to mid inferior, basal  inferoseptal, and apical walls.  *** No previous Nuclear/Stress exam.  ADDENDUM 4/6/2023: To clarify radiopharmaceutical  doses/times    < end of copied text >    < from: TTE W or WO Ultrasound Enhancing Agent (04.06.23 @ 14:54) >  CONCLUSIONS:      1. Normal left ventricular cavity size. The left ventricular wall thickness is normal. The left ventricular systolic function is normal with an ejection fraction of 74 % by Faye's method of disks. There are no regional wall motion abnormalities seen.   2. There is normal left ventricular diastolic function.   3. Normal right ventricular cavity size, normal wall thickness and normal systolic function. The tricuspid annular plane systolic excursion (TAPSE) is 2.3 cm (normal >=1.7 cm).   4. The aortic valve is tricuspid with normal structure without stenosis. There is focal calcification of the aortic valve leaflets. There is fibrocalcific aortic valve sclerosis without stenosis. The aortic peak velocity is 2.1 m/s. There is mild-to-moderate aortic regurgitation.   5. There is trace mitral and mild tricuspid regurgitation.   6. Pulmonary artery systolic pressure could not be estimated.   7. No pericardial effusion seen.   8. Compared to the transthoracic echocardiogram performed on 3/22/2022 there have been no significant interval changes.    ________________________________________________________________________________________  FINDINGS:  Left Ventricle:  Normal left ventricular cavity size. The left ventricular wall thickness is normal. The left ventricular systolic function is normal with a calculated ejection fraction of 74 % by the Faye's biplane method of disks. There are no regional wall motion abnormalities seen. There is normal left ventricular diastolic function, with normal filling pressure.     Right Ventricle:  Normal right ventricular cavity size, normal wall thickness and normal systolic function. Tricuspidannular plane systolic excursion (TAPSE) is 2.3 cm (normal >=1.7 cm).     Right Atrium:  The right atrium is severely dilated with an indexed volume of 40.36 ml/m² and an indexed area of 12.62 cm²/m².     Aortic Valve:  The aortic valve is tricuspid with normal structure without stenosis. There is focal calcification of the aortic valve leaflets. There is fibrocalcific aortic valve sclerosis without stenosis. The aortic peak velocity is 2.1 m/s. There is mild-to-moderate aortic regurgitation. AI VMax is 4.00 m/s. AI pressure half time is 559 msec. AI slope is 2.05 m/s².     Mitral Valve:  Structurally normal mitral valve with normal leaflet excursion. There is trace mitral regurgitation.     Tricuspid Valve:  Structurally normal tricuspid valve with normal leaflet excursion. There is mild tricuspid regurgitation. There is insufficient tricuspid regurgitation detected to calculate pulmonary artery systolic pressure.     Pulmonic Valve:  Structurally normal pulmonic valve with normal leaflet excursion. There is mild pulmonic regurgitation.     Aorta:  The aortic annulus and aortic root appear normal in size.     Pericardium:  No pericardial effusion seen.  ____________________________________________________________________  QUANTITATIVE DATA  Left Ventricle Measurements                         Indexed to BSA  IVSd (2D):   1.8 cm  LVPWd (2D):  1.3 cm  LVIDd (2D):  4.6 cm  LVIDs (2D):  2.5 cm  LV Mass:     310 g  151.8 g/m²  LV Vol d, A2C: 58.8 ml 28.77 ml/m²  LV Vol d, A4C: 88.9 ml 43.49 ml/m²  LV Vol d, BP:  73.4 ml 35.90 ml/m²  LV Vol s, A2C: 14.4 ml 7.05 ml/m²  LV Vol s, A4C: 20.9 ml 10.23 ml/m²  LV Vol s, BP:  18.8 ml 9.21 ml/m²  LV SV BP:      54.5 ml  LV EF BP:      74 %     MV E Vmax:    0.92 m/s  e' lateral:   14.00 cm/s  e' medial:    9.00 cm/s  E/e' lateral: 6.59  E/e' medial:  10.24  E/e' Average: 8.02    Aorta Measurements     Ao Root at STJ: 3.5 cm       Left Atrium Measurements     LA Diam 2D: 6.30    Right Ventricle Measurements Right Atrial Measurements     TAPSE: 2.3 cm                RA Vol:       82.50 ml                               RA Vol Index: 40.36 ml/m²    LVOT / RVOT/ Qp/Qs Data:  LVOT Diameter:   2.20 cm  LVOT Vmax:       1.23 m/s  LVOT VTI:        25.70 cm  LVOT SV:         97.7 ml  LVOT SV Indexed: 47.80 ml/m²  RVOT VTI:        18.03 cm    Mitral Valve Measurements     MV E Vmax: 0.9 m/s    < end of copied text >   This is a 72 year old male PMH of CAD ( 2020 s/p multivessel PCI to LAD and OM1- see cath report below), HTN, HLD, A fib dx 2016 (on Xarelto, last dose 4/12 AM), DM type 2 ( well managed on Metformin as per pt, w/o complications, last A1C unknown), PVD/PAD s/p peripheral stent to Right leg ( as per pt, x8 months ago done at [place]- on ASA only, no Plavix), + ruslan lower extremities varicose veins, 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    Seen and evaluated by Dr Noriega for c/c of chest "discomfort" on exertion; Had abnormal NST on 4/6/2023, presents here today for [facility] for further evaluation.       OF nOTE: - pin point, pink colored, raised rash noted on chest with some areas of scabbing, pt denies any discomfort, urticaria or pain at site, denies from noticing rash before this am.   - sacral non blanchable redness noted also on exam, pt reports no discomfort to the area, also unaware of redness prior to today  please see "physical exam" section, for more detailed information on dermatological findings.         < from: Cardiac Cath Lab - Adult (01.17.20 @ 16:54) >  CORONARY VESSELS: The coronary circulation is right dominant.  LM:   --  LM: Angiography showed minor luminal irregularities with no flow  limiting lesions.  LAD:   --  Mid LAD: There was a 90 % stenosis.  CX:   --  OM1: There was a 90 % stenosis.  RCA:   --  RCA: Angiography showed minor luminal irregularities with no  flow limiting lesions.  COMPLICATIONS: There were no complications.  DIAGNOSTIC RECOMMENDATIONS: ASA and Plavix for 1 year.  INTERVENTIONAL RECOMMENDATIONS: ASA and Plavix for 1 year.  Prepared and signed by  Luigi Cabrera M.D.    < end of copied text >      < from: Nuclear Stress Test-Pharmacologic (04.06.23 @ 10:57) >  ------------------------------------------------------------------------  GATED ANALYSIS:  Post-stress gated wall motion analysis was performed (LVEF  = 54 %;LVEDV = 154 ml.) revealing hypokinesis of the basal  to mid inferolateral, basal to mid inferior, basal  inferoseptal, and apical walls.  ------------------------------------------------------------------------  IMPRESSIONS:Abnormal Study  * HR Response: Appropriate; BP Response: Appropriate.  * Heart Rhythm: Atrial Fibrillation - 52 BPM.  * Conduction defects: right bundle branch block.  * Baseline ECG: Nonspecific ST-T wave abnormality.  * ECG Changes: No significant ischemic ST segment changes  beyond baseline abnormalities.  * The left ventricle was enlarged.  * There are large, mild to moderate defects in the  anterior and basal to mid anterolateral walls that are  reversible suggestive of ischemia.  * There is a small, moderate defect in the apex that is  partially reversible suggestive of infarct with mild to  moderate pierre-infarct ischemia.  * There are small, mild defects in the distal inferior and  distal inferoseptal walls that are partially reversible  suggestive of mild ischemia with partial scarring.  * There are medium-sized, mild defects in the basal to mid  inferior, basal to mid inferolateral, and basal  inferoseptal walls that are mostly fixed suggestive of  infarct with mild pierre-infarct ischemia.  * The patient was unable to perform prone imaging.  * Post-stress gated wall motion analysis was performed  (LVEF = 54 %;LVEDV = 154 ml.) revealing hypokinesis of the  basal to mid inferolateral, basal to mid inferior, basal  inferoseptal, and apical walls.  *** No previous Nuclear/Stress exam.  ADDENDUM 4/6/2023: To clarify radiopharmaceutical  doses/times    < end of copied text >    < from: TTE W or WO Ultrasound Enhancing Agent (04.06.23 @ 14:54) >  CONCLUSIONS:      1. Normal left ventricular cavity size. The left ventricular wall thickness is normal. The left ventricular systolic function is normal with an ejection fraction of 74 % by Faye's method of disks. There are no regional wall motion abnormalities seen.   2. There is normal left ventricular diastolic function.   3. Normal right ventricular cavity size, normal wall thickness and normal systolic function. The tricuspid annular plane systolic excursion (TAPSE) is 2.3 cm (normal >=1.7 cm).   4. The aortic valve is tricuspid with normal structure without stenosis. There is focal calcification of the aortic valve leaflets. There is fibrocalcific aortic valve sclerosis without stenosis. The aortic peak velocity is 2.1 m/s. There is mild-to-moderate aortic regurgitation.   5. There is trace mitral and mild tricuspid regurgitation.   6. Pulmonary artery systolic pressure could not be estimated.   7. No pericardial effusion seen.   8. Compared to the transthoracic echocardiogram performed on 3/22/2022 there have been no significant interval changes.    ________________________________________________________________________________________  FINDINGS:  Left Ventricle:  Normal left ventricular cavity size. The left ventricular wall thickness is normal. The left ventricular systolic function is normal with a calculated ejection fraction of 74 % by the Faye's biplane method of disks. There are no regional wall motion abnormalities seen. There is normal left ventricular diastolic function, with normal filling pressure.     Right Ventricle:  Normal right ventricular cavity size, normal wall thickness and normal systolic function. Tricuspidannular plane systolic excursion (TAPSE) is 2.3 cm (normal >=1.7 cm).     Right Atrium:  The right atrium is severely dilated with an indexed volume of 40.36 ml/m² and an indexed area of 12.62 cm²/m².     Aortic Valve:  The aortic valve is tricuspid with normal structure without stenosis. There is focal calcification of the aortic valve leaflets. There is fibrocalcific aortic valve sclerosis without stenosis. The aortic peak velocity is 2.1 m/s. There is mild-to-moderate aortic regurgitation. AI VMax is 4.00 m/s. AI pressure half time is 559 msec. AI slope is 2.05 m/s².     Mitral Valve:  Structurally normal mitral valve with normal leaflet excursion. There is trace mitral regurgitation.     Tricuspid Valve:  Structurally normal tricuspid valve with normal leaflet excursion. There is mild tricuspid regurgitation. There is insufficient tricuspid regurgitation detected to calculate pulmonary artery systolic pressure.     Pulmonic Valve:  Structurally normal pulmonic valve with normal leaflet excursion. There is mild pulmonic regurgitation.     Aorta:  The aortic annulus and aortic root appear normal in size.     Pericardium:  No pericardial effusion seen.  ____________________________________________________________________  QUANTITATIVE DATA  Left Ventricle Measurements                         Indexed to BSA  IVSd (2D):   1.8 cm  LVPWd (2D):  1.3 cm  LVIDd (2D):  4.6 cm  LVIDs (2D):  2.5 cm  LV Mass:     310 g  151.8 g/m²  LV Vol d, A2C: 58.8 ml 28.77 ml/m²  LV Vol d, A4C: 88.9 ml 43.49 ml/m²  LV Vol d, BP:  73.4 ml 35.90 ml/m²  LV Vol s, A2C: 14.4 ml 7.05 ml/m²  LV Vol s, A4C: 20.9 ml 10.23 ml/m²  LV Vol s, BP:  18.8 ml 9.21 ml/m²  LV SV BP:      54.5 ml  LV EF BP:      74 %     MV E Vmax:    0.92 m/s  e' lateral:   14.00 cm/s  e' medial:    9.00 cm/s  E/e' lateral: 6.59  E/e' medial:  10.24  E/e' Average: 8.02    Aorta Measurements     Ao Root at STJ: 3.5 cm       Left Atrium Measurements     LA Diam 2D: 6.30    Right Ventricle Measurements Right Atrial Measurements     TAPSE: 2.3 cm                RA Vol:       82.50 ml                               RA Vol Index: 40.36 ml/m²    LVOT / RVOT/ Qp/Qs Data:  LVOT Diameter:   2.20 cm  LVOT Vmax:       1.23 m/s  LVOT VTI:        25.70 cm  LVOT SV:         97.7 ml  LVOT SV Indexed: 47.80 ml/m²  RVOT VTI:        18.03 cm    Mitral Valve Measurements     MV E Vmax: 0.9 m/s    < end of copied text >

## 2023-04-14 NOTE — ASU PATIENT PROFILE, ADULT - HAVE YOU HAD COVID IN THE LAST 60 DAYS?
Telephone Encounter by Teena Corley NCMA at 12/11/17 02:44 PM     Author:  Teena Corley NCMA Service:  (none) Author Type:  Certified Medical Assistant     Filed:  12/11/17 02:44 PM Encounter Date:  12/11/2017 Status:  Signed     :  Teena Corley NCMA (Certified Medical Assistant)            Patient notified[SB1.1T] and patient transferred to make appointment[SB1.1M]       Revision History        User Key Date/Time User Provider Type Action    > SB1.1 12/11/17 02:44 PM Teena Corley NCMA Certified Medical Assistant Sign    M - Manual, T - Template             No

## 2023-04-14 NOTE — ASU DISCHARGE PLAN (ADULT/PEDIATRIC) - NS MD DC FALL RISK RISK
For information on Fall & Injury Prevention, visit: [email or web address] OR  [email or web address] OR  https://www.cdc.gov/steadi/patient.html

## 2023-04-14 NOTE — ASU DISCHARGE PLAN (ADULT/PEDIATRIC) - ASU DC SPECIAL INSTRUCTIONSFT
Wound Care:   the day AFTER your procedure remove bandage GENTTLY, and clean using  mild soap and gentle warm, water stream, pat dry. leave OPEN to air. YOU MAY SHOWER   DO NOT apply lotions, creams, ointments, powder, parfumes to your incision site  DO NOT SOAK your site for 1 week ( no baths, no pools, no tubs, etc...)  Check  your groin and /or wirst daily.A small amount of bruising, and soarness are normal    ACTIVITY: for 24 hours   - DO NOT DRIVE  - DO NOT make any important decisions or sign legal documents   - DO NOT operate heavy machinaries   - you may resume sexual activity in 48 hours, unless otherwise instructed by your cardiologist     If your procedure was done through the WRIST: for the NEXT 3DAYS:  - avoid pushing, pulling, with that affected wrist   - avoid repeated movement of that hand and wrist ( eg: typing, hammering)  - DO NOT LIFT anything more than 5 lbs     MEDICATION:   take your medications as explained ( see discharge paperwork)       Follow heart healthy diet reccomended by your doctor, , if you smoke STOP SMOKING ( may call [phone] for center of tobacco control if you need assistance)     CALL your doctor to make appointment in 2 WEEKS     ***CALL YOUR DOCTOR***  if you experience: fever, chills, body aches, or severe pain, swelling, redness, heat or yellow discharge at incision site  If you experience Bleeding or excruciating pain at the procedural site, sweliing ( golf ball size) at your procedural site  If you experience CHEST PAIN  If you experience extremity numbness, tingling, temperature change ( of your procedural site)   If you are unable to reach your doctor, you may contact:   -Cardiology Office at [facility] at [phone] or   - [facility] [phone]  - [facility] [phone]

## 2023-04-14 NOTE — H&P CARDIOLOGY - NSICDXPASTSURGICALHX_GEN_ALL_CORE_FT
PAST SURGICAL HISTORY:  No significant past surgical history PAST SURGICAL HISTORY:  Amputation of toe     H/O cardiac catheterization     History of prostate surgery     S/P peripheral artery angioplasty

## 2023-04-26 PROBLEM — L97.309: Chronic | Status: ACTIVE | Noted: 2023-04-14

## 2023-04-26 PROBLEM — Z86.79 PERSONAL HISTORY OF OTHER DISEASES OF THE CIRCULATORY SYSTEM: Chronic | Status: ACTIVE | Noted: 2023-04-14

## 2023-04-27 ENCOUNTER — APPOINTMENT (OUTPATIENT)
Dept: OPHTHALMOLOGY | Facility: CLINIC | Age: 73
End: 2023-04-27

## 2023-04-27 PROBLEM — I73.9 PERIPHERAL VASCULAR DISEASE, UNSPECIFIED: Chronic | Status: ACTIVE | Noted: 2023-04-14

## 2023-04-27 PROBLEM — L97.529 NON-PRESSURE CHRONIC ULCER OF OTHER PART OF LEFT FOOT WITH UNSPECIFIED SEVERITY: Chronic | Status: ACTIVE | Noted: 2023-04-14

## 2023-05-16 ENCOUNTER — APPOINTMENT (OUTPATIENT)
Dept: CARDIOLOGY | Facility: CLINIC | Age: 73
End: 2023-05-16

## 2023-05-17 ENCOUNTER — APPOINTMENT (OUTPATIENT)
Dept: OPHTHALMOLOGY | Facility: AMBULATORY SURGERY CENTER | Age: 73
End: 2023-05-17

## 2023-05-18 ENCOUNTER — APPOINTMENT (OUTPATIENT)
Dept: OPHTHALMOLOGY | Facility: CLINIC | Age: 73
End: 2023-05-18

## 2023-05-24 ENCOUNTER — APPOINTMENT (OUTPATIENT)
Dept: OPHTHALMOLOGY | Facility: CLINIC | Age: 73
End: 2023-05-24

## 2023-06-09 NOTE — ED ADULT TRIAGE NOTE - NSSEPSISSUSPECTED_ED_A_ED
Tyrell Sharp
Controlled with current regime
No

## 2023-08-06 ENCOUNTER — NON-APPOINTMENT (OUTPATIENT)
Age: 73
End: 2023-08-06

## 2023-08-15 NOTE — DISCHARGE NOTE NURSING/CASE MANAGEMENT/SOCIAL WORK - NSDCFUADDAPPT_GEN_ALL_CORE_FT
Infectious Disease Cardiology Gastroenterology Colorectal Surgery Gastroenterology Please f/u with Dr. Benavides

## 2023-09-08 ENCOUNTER — APPOINTMENT (OUTPATIENT)
Dept: UROLOGY | Facility: CLINIC | Age: 73
End: 2023-09-08

## 2023-09-08 ENCOUNTER — NON-APPOINTMENT (OUTPATIENT)
Age: 73
End: 2023-09-08

## 2023-09-13 NOTE — ED PROVIDER NOTE - CPE EDP ENMT NORM
normal...
ADL retraining/balance training/bed mobility training/motor coordination training/neuromuscular re-education/strengthening/transfer training

## 2023-09-20 ENCOUNTER — NON-APPOINTMENT (OUTPATIENT)
Age: 73
End: 2023-09-20

## 2023-09-20 ENCOUNTER — APPOINTMENT (OUTPATIENT)
Dept: OPHTHALMOLOGY | Facility: CLINIC | Age: 73
End: 2023-09-20
Payer: MEDICARE

## 2023-09-20 PROCEDURE — 92012 INTRM OPH EXAM EST PATIENT: CPT

## 2023-09-22 ENCOUNTER — NON-APPOINTMENT (OUTPATIENT)
Age: 73
End: 2023-09-22

## 2023-09-22 ENCOUNTER — APPOINTMENT (OUTPATIENT)
Dept: OPHTHALMOLOGY | Facility: CLINIC | Age: 73
End: 2023-09-22
Payer: MEDICARE

## 2023-09-22 PROCEDURE — 92012 INTRM OPH EXAM EST PATIENT: CPT

## 2023-09-26 ENCOUNTER — NON-APPOINTMENT (OUTPATIENT)
Age: 73
End: 2023-09-26

## 2023-09-26 ENCOUNTER — APPOINTMENT (OUTPATIENT)
Dept: OPHTHALMOLOGY | Facility: CLINIC | Age: 73
End: 2023-09-26
Payer: MEDICARE

## 2023-09-26 PROCEDURE — 92014 COMPRE OPH EXAM EST PT 1/>: CPT

## 2023-10-03 ENCOUNTER — NON-APPOINTMENT (OUTPATIENT)
Age: 73
End: 2023-10-03

## 2023-10-03 ENCOUNTER — APPOINTMENT (OUTPATIENT)
Dept: OPHTHALMOLOGY | Facility: CLINIC | Age: 73
End: 2023-10-03
Payer: MEDICARE

## 2023-10-03 PROCEDURE — 92012 INTRM OPH EXAM EST PATIENT: CPT

## 2023-10-10 ENCOUNTER — APPOINTMENT (OUTPATIENT)
Dept: OPHTHALMOLOGY | Facility: CLINIC | Age: 73
End: 2023-10-10
Payer: MEDICARE

## 2023-10-10 ENCOUNTER — NON-APPOINTMENT (OUTPATIENT)
Age: 73
End: 2023-10-10

## 2023-10-10 PROCEDURE — 92012 INTRM OPH EXAM EST PATIENT: CPT

## 2023-10-27 ENCOUNTER — APPOINTMENT (OUTPATIENT)
Dept: OPHTHALMOLOGY | Facility: CLINIC | Age: 73
End: 2023-10-27
Payer: MEDICARE

## 2023-10-27 ENCOUNTER — NON-APPOINTMENT (OUTPATIENT)
Age: 73
End: 2023-10-27

## 2023-10-27 PROCEDURE — 92012 INTRM OPH EXAM EST PATIENT: CPT

## 2023-11-11 ENCOUNTER — EMERGENCY (EMERGENCY)
Facility: HOSPITAL | Age: 73
LOS: 1 days | Discharge: HOME | End: 2023-11-11
Attending: EMERGENCY MEDICINE | Admitting: EMERGENCY MEDICINE
Payer: MEDICARE

## 2023-11-11 VITALS
TEMPERATURE: 98 F | DIASTOLIC BLOOD PRESSURE: 57 MMHG | WEIGHT: 190.04 LBS | SYSTOLIC BLOOD PRESSURE: 123 MMHG | OXYGEN SATURATION: 98 % | HEART RATE: 68 BPM | RESPIRATION RATE: 18 BRPM

## 2023-11-11 DIAGNOSIS — Z98.62 PERIPHERAL VASCULAR ANGIOPLASTY STATUS: Chronic | ICD-10-CM

## 2023-11-11 DIAGNOSIS — Z98.890 OTHER SPECIFIED POSTPROCEDURAL STATES: Chronic | ICD-10-CM

## 2023-11-11 DIAGNOSIS — S98.139A COMPLETE TRAUMATIC AMPUTATION OF ONE UNSPECIFIED LESSER TOE, INITIAL ENCOUNTER: Chronic | ICD-10-CM

## 2023-11-11 PROCEDURE — 70450 CT HEAD/BRAIN W/O DYE: CPT | Mod: MG

## 2023-11-11 PROCEDURE — G1004: CPT

## 2023-11-11 PROCEDURE — 70450 CT HEAD/BRAIN W/O DYE: CPT | Mod: 26,MG

## 2023-11-11 PROCEDURE — 99284 EMERGENCY DEPT VISIT MOD MDM: CPT | Mod: 25

## 2023-11-11 PROCEDURE — 99284 EMERGENCY DEPT VISIT MOD MDM: CPT | Mod: FS

## 2023-11-11 NOTE — ED PROVIDER NOTE - OBJECTIVE STATEMENT
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  States was sleeping in lazy boy chair and leaned backwards/ felt like chair was going to fall and hit back of head against wall.  Reports minimal pain.  Denies loc, dizziness, neck pain, n/v, numbness/tingling/weakness to ext.  Denies other injury.

## 2023-11-11 NOTE — ED ADULT NURSE NOTE - NSFALLRISKINTERV_ED_ALL_ED

## 2023-11-11 NOTE — ED ADULT NURSE NOTE - NSICDXPASTMEDICALHX_GEN_ALL_CORE_FT
PAST MEDICAL HISTORY:  Atrial fibrillation     CAD (coronary artery disease)     Diabetes 1.5, managed as type 2     H/O varicose veins     HLD (hyperlipidemia)     HTN (hypertension)     PAD (peripheral artery disease)     PVD (peripheral vascular disease)     Skin ulcer of ankle     Skin ulcer of left great toe

## 2023-11-11 NOTE — ED ADULT NURSE NOTE - OBJECTIVE STATEMENT
patient arrived to the ER for a head injury as per pt. Pt said fell from chair when it tipped back. Pt reports head strike and taking blood thinner but reports no LOC. Pt denies any YEBOAH, dizziness, vision changes and denies any other medical complaints. Pt is noted to be aox3, able to maintain airway, having nonlabored breathing, no retractions noted, non diaphoretic and able to talk in clear full sentences.

## 2023-11-11 NOTE — ED ADULT NURSE NOTE - NSICDXPASTSURGICALHX_GEN_ALL_CORE_FT
PAST SURGICAL HISTORY:  Amputation of toe     H/O cardiac catheterization     History of prostate surgery     S/P peripheral artery angioplasty

## 2023-11-11 NOTE — ED PROVIDER NOTE - NS ED ATTENDING STATEMENT MOD
This was a shared visit with the EDI. I reviewed and verified the documentation and independently performed the documented:

## 2023-11-11 NOTE — ED PROVIDER NOTE - PHYSICAL EXAMINATION
CONSTITUTIONAL: Awake, alert.  Nontoxic, no acute distress.    HEAD: Normocephalic, atraumatic.    EYES: Conjunctivae clear without exudates or hemorrhage. Sclera is non-icteric.    ENT: Normal appearing external ears, nose, mucous membranes moist.    NECK: supple, trachea midline.  No midline or paraspinal ttp.  FROM     MUSCULOSKELETAL:  Moving all extremities without issue.    SKIN: Skin in warm, dry and intact without rashes or lesions.  Appropriate color for ethnicity.    NEUROLOGICAL:  Awake, alert and oriented x 3.  Normal speech and cognition.  Face symmetric with equal sensation to light touch throughout, PERRL, EOMI, no nystagmus, hearing grossly equal and intact b/l, no tongue deviation, intact strength upon turning head against resistance b/l, No gross motor deficit, 5/5 strength throughout, no gross sensory loss to light touch b/l upper and lower ext, normal movement.  No dysmetria on finger to nose testing.  Neg pronator drift.     PSYCH: Appropriate mood and affect. Good judgment and insight.

## 2023-11-11 NOTE — ED PROVIDER NOTE - ATTENDING APP SHARED VISIT CONTRIBUTION OF CARE
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  States was sleeping in lazy boy chair and leaned backwards/ felt like chair was going to fall and hit back of head against wall.  Reports minimal pain.  Denies loc, dizziness, neck pain, n/v, numbness/tingling/weakness to ext.  Denies other injury.    Exam grossly reassuring.  No apparent signs trauma.  Neuro exam reassuring  CTH neg  Will DC with strict return precautions, f/u pmd    Agree with above note as documented by PA.  I was available as the supervising attending during patient's ER evaluation.

## 2023-11-11 NOTE — ED PROVIDER NOTE - CLINICAL SUMMARY MEDICAL DECISION MAKING FREE TEXT BOX
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  States was sleeping in lazy boy chair and leaned backwards/ felt like chair was going to fall and hit back of head against wall.  Reports minimal pain.  Denies loc, dizziness, neck pain, n/v, numbness/tingling/weakness to ext.  Denies other injury.    Exam grossly reassuring.  No apparent signs trauma.  Neuro exam reassuring  CTH neg  Will DC with strict return precautions, f/u pmd

## 2023-11-11 NOTE — ED PROVIDER NOTE - NSFOLLOWUPINSTRUCTIONS_ED_ALL_ED_FT
Thank you for visiting [facility] Emergency Department.      We saw you today for a head injury.    You may take tylenol for pain.  Rest. Drink plenty of fluids.    Please know that no emergency visit is complete without follow-up with your primary care provider in 1 week.  Please bring copies of all discharge papers and results and show to your doctor.      Please continue taking all previous medications as instructed unless we discussed otherwise.     I appreciated your patience and hope you feel better soon.     Return to ER immediately if you develop fevers, chills, chest pain, shortness of breath, worsening and/or any concerning symptoms.  --  Concussion, Adult  Three rear views of the head showing how quick, sudden head movements injure the brain.  A concussion is a brain injury from a hard, direct hit (trauma) to the head or body. This direct hit causes the brain to shake quickly back and forth inside the skull. This can damage brain cells and cause chemical changes in the brain. A concussion may also be known as a mild traumatic brain injury (TBI).    The effects of a concussion can be serious. If you have a concussion, you should be very careful to avoid having a second concussion.    What are the causes?  This condition is caused by:  A direct hit to your head.  Sudden movement of your body that causes your brain to move back and forth inside the skull, such as in a car crash.  What are the signs or symptoms?  The signs of a concussion can be hard to notice. Early on, they may be missed by you, family members, and health care providers. You may look fine on the outside but may act or feel differently.    Every head injury is different. Symptoms are usually temporary but may last for days, weeks, or even months. Some symptoms appear right away, but other symptoms may not show up for hours or days.    Physical symptoms    Headaches.  Dizziness and problems with coordination or balance.  Sensitivity to light or noise.  Nausea or vomiting.  Tiredness (fatigue).  Vision or hearing problems.  Seizure.  Mental and emotional symptoms    Irritability or mood changes.  Memory problems.  Trouble concentrating, organizing, or making decisions.  Changes in eating or sleeping patterns.  Slowness in thinking, acting or reacting, speaking, or reading.  Anxiety or depression.  How is this diagnosed?  This condition is diagnosed based on your symptoms and injury.    You may also have tests, including:  Imaging tests, such as a CT scan or an MRI.  Neuropsychological tests. These measure your thinking, understanding, learning, and memory.  How is this treated?  Treatment for this condition includes:  Stopping sports or activity if you are injured.  Physical and mental rest and careful observation, usually at home.  Medicines to help with symptoms such as headaches, nausea, or difficulty sleeping.  Referral to a concussion clinic or rehab center.  Follow these instructions at home:  Activity    Limit activities that require a lot of thought or concentration, such as:  Doing homework or job-related work.  Watching TV.  Using the computer or phone.  Playing memory games and doing puzzles.  Rest helps your brain heal. Make sure you:  Get plenty of sleep. Most adults should get 7–9 hours of sleep each night.  Rest during the day. Take naps or rest breaks when you feel tired.  Avoid high-intensity exercise or physical activities that take a lot of effort. Stop any activity that worsens symptoms. Your health care provider may recommend light exercise such as walking.  Do not do high-risk activities that could cause a second concussion, such as riding a bike or playing sports.  Ask your health care provider when you can return to your normal activities, such as school, work, sports, and driving. Your ability to react may be slower after a brain injury. Never do these activities if you are dizzy.  General instructions    A bottle of beer, a glass of wine, and a glass of hard liquor with a "do not drink" sign over them.   Take over-the-counter and prescription medicines only as told by your health care provider. Some medicines, such as blood thinners (anticoagulants) and aspirin, may increase the risk for complications, such as bleeding.  Avoid taking opioid pain medicine while recovering from a concussion.  Do not drink alcohol until your health care provider says you can. Drinking alcohol may slow your recovery and can put you at risk of further injury.  Watch your symptoms and tell others around you to do the same. Complications sometimes occur after a concussion.  Tell your , teachers, school nurse, school counselor, , or  about your injury, symptoms, and restrictions.  See a mental health therapist if you feel anxious or depressed. Managing this condition can be challenging.  Keep all follow-up visits. Your health care provider will check on your recovery and give you a plan for returning to activities.  How is this prevented?  Avoiding another brain injury is very important. In rare cases, another injury can lead to permanent brain damage, brain swelling, or death. The risk of this is greatest during the first 7–10 days after a head injury. Avoid injuries by:  Stopping activities that could lead to a second concussion, such as contact or recreational sports, until your health care provider says it is okay.  Taking these actions once you have returned to sports or activities:  Avoid plays or moves that can cause you to crash into another person. This is how most concussions occur.  Follow the rules and be respectful of other players. Do not engage in violent or illegal plays.  Getting regular exercise that includes strength and balance training.  Wearing a properly fitting helmet during sports, biking, or other activities. Helmets can help protect you from serious skull and brain injuries, but they may not protect you from a concussion. Even when wearing a helmet, you should avoid being hit in the head.  Where to find more information  Centers for Disease Control and Prevention: cdc.gov  Contact a health care provider if:  Your symptoms do not improve or get worse.  You have new symptoms.  You have another injury.  Your coordination gets worse.  You have unusual behavior changes.  Get help right away if:  You have a severe or worsening headache.  You have weakness or numbness in any part of your body, slurred speech, vision changes, or confusion.  You vomit repeatedly.  You lose consciousness, are sleepier than normal, or are difficult to wake up.  You have a seizure.  These symptoms may be an emergency. Get help right away. Call 911.  Do not wait to see if the symptoms will go away.  Do not drive yourself to the hospital.  Also, get help right away if:  You have thoughts of hurting yourself or others.  Take one of these steps if you feel like you may hurt yourself or others, or have thoughts about taking your own life:  Go to your nearest emergency room.  Call 911.  Call the National Suicide Prevention Lifeline at 1-848.703.8016 or 802. This is open 24 hours a day.  Text the Crisis Text Line at 962878.  This information is not intended to replace advice given to you by your health care provider. Make sure you discuss any questions you have with your health care provider.

## 2023-11-11 NOTE — ED ADULT NURSE NOTE - NSFALLLASTSIX_ED_ALL_ED
Yes.
patient with R 5th finger laceration, repaired with out complication, tetanus up dated. advised return in one week for suture removal

## 2023-11-11 NOTE — ED PROVIDER NOTE - PATIENT PORTAL LINK FT
You can access the FollowMyHealth Patient Portal offered by [facility] by registering at the following website: [email or web address]. By joining [employer]’s FollowMyHealth portal, you will also be able to view your health information using other applications (apps) compatible with our system.

## 2023-11-11 NOTE — ED PROVIDER NOTE - NS ED ROS FT
CONSTITUTIONAL: Denies fever and chills    HEENT: See HPI    GASTROINTESTINAL: Denies abdominal pain, nausea, vomiting and diarrhea.    MUSCULOSKELETAL: See HPI    NEUROLOGICAL: See HPI

## 2023-11-13 DIAGNOSIS — Z86.79 PERSONAL HISTORY OF OTHER DISEASES OF THE CIRCULATORY SYSTEM: ICD-10-CM
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
## 2023-11-13 DIAGNOSIS — S09.90XA UNSPECIFIED INJURY OF HEAD, INITIAL ENCOUNTER: ICD-10-CM

## 2023-11-13 DIAGNOSIS — I10 ESSENTIAL (PRIMARY) HYPERTENSION: ICD-10-CM

## 2023-11-13 DIAGNOSIS — Z95.5 PRESENCE OF CORONARY ANGIOPLASTY IMPLANT AND GRAFT: ICD-10-CM

## 2023-11-13 DIAGNOSIS — Z79.01 LONG TERM (CURRENT) USE OF ANTICOAGULANTS: ICD-10-CM

## 2023-11-13 DIAGNOSIS — Z89.429 ACQUIRED ABSENCE OF OTHER TOE(S), UNSPECIFIED SIDE: ICD-10-CM

## 2023-11-13 DIAGNOSIS — E11.9 TYPE 2 DIABETES MELLITUS WITHOUT COMPLICATIONS: ICD-10-CM
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
## 2023-11-13 DIAGNOSIS — I48.91 UNSPECIFIED ATRIAL FIBRILLATION: ICD-10-CM

## 2023-11-13 DIAGNOSIS — Z79.82 LONG TERM (CURRENT) USE OF ASPIRIN: ICD-10-CM

## 2023-11-13 DIAGNOSIS — I25.10 ATHEROSCLEROTIC HEART DISEASE OF NATIVE CORONARY ARTERY WITHOUT ANGINA PECTORIS: ICD-10-CM

## 2023-11-13 DIAGNOSIS — E78.5 HYPERLIPIDEMIA, UNSPECIFIED: ICD-10-CM

## 2023-11-13 DIAGNOSIS — Z79.02 LONG TERM (CURRENT) USE OF ANTITHROMBOTICS/ANTIPLATELETS: ICD-10-CM

## 2023-11-13 DIAGNOSIS — Z79.84 LONG TERM (CURRENT) USE OF ORAL HYPOGLYCEMIC DRUGS: ICD-10-CM

## 2023-11-17 ENCOUNTER — NON-APPOINTMENT (OUTPATIENT)
Age: 73
End: 2023-11-17

## 2023-11-17 ENCOUNTER — APPOINTMENT (OUTPATIENT)
Dept: OPHTHALMOLOGY | Facility: CLINIC | Age: 73
End: 2023-11-17
Payer: MEDICARE

## 2023-11-17 PROCEDURE — 92012 INTRM OPH EXAM EST PATIENT: CPT

## 2023-12-06 ENCOUNTER — NON-APPOINTMENT (OUTPATIENT)
Age: 73
End: 2023-12-06

## 2023-12-06 PROCEDURE — 92083 EXTENDED VISUAL FIELD XM: CPT

## 2023-12-06 PROCEDURE — 92012 INTRM OPH EXAM EST PATIENT: CPT

## 2023-12-07 ENCOUNTER — APPOINTMENT (OUTPATIENT)
Dept: OPHTHALMOLOGY | Facility: CLINIC | Age: 73
End: 2023-12-07
Payer: MEDICARE

## 2024-03-14 ENCOUNTER — APPOINTMENT (OUTPATIENT)
Dept: OPHTHALMOLOGY | Facility: CLINIC | Age: 74
End: 2024-03-14
Payer: MEDICARE

## 2024-03-14 ENCOUNTER — NON-APPOINTMENT (OUTPATIENT)
Age: 74
End: 2024-03-14

## 2024-03-14 PROCEDURE — 92083 EXTENDED VISUAL FIELD XM: CPT

## 2024-03-14 PROCEDURE — 92012 INTRM OPH EXAM EST PATIENT: CPT

## 2024-03-14 PROCEDURE — 92133 CPTRZD OPH DX IMG PST SGM ON: CPT

## 2024-04-20 ENCOUNTER — TRANSCRIPTION ENCOUNTER (OUTPATIENT)
Age: 74
End: 2024-04-20

## 2024-04-24 RX ORDER — FUROSEMIDE 20 MG/1
20 TABLET ORAL
Refills: 0 | Status: DISCONTINUED | COMMUNITY
End: 2024-04-24

## 2024-04-24 RX ORDER — NITROFURANTOIN MACROCRYSTALS 50 MG/1
50 CAPSULE ORAL
Qty: 7 | Refills: 0 | Status: DISCONTINUED | COMMUNITY
Start: 2022-07-17 | End: 2024-04-24

## 2024-04-24 RX ORDER — ROSUVASTATIN CALCIUM 10 MG/1
10 TABLET, FILM COATED ORAL
Refills: 0 | Status: DISCONTINUED | COMMUNITY
End: 2024-04-24

## 2024-04-24 RX ORDER — CEPHALEXIN 500 MG/1
500 CAPSULE ORAL
Qty: 20 | Refills: 0 | Status: DISCONTINUED | COMMUNITY
Start: 2022-08-02 | End: 2024-04-24

## 2024-04-24 RX ORDER — CIPROFLOXACIN HYDROCHLORIDE 500 MG/1
500 TABLET, FILM COATED ORAL
Qty: 14 | Refills: 0 | Status: DISCONTINUED | COMMUNITY
Start: 2022-05-19 | End: 2024-04-24

## 2024-04-24 RX ORDER — CLOPIDOGREL 75 MG/1
75 TABLET, FILM COATED ORAL DAILY
Qty: 30 | Refills: 0 | Status: DISCONTINUED | COMMUNITY
End: 2024-04-24

## 2024-04-24 RX ORDER — LOSARTAN POTASSIUM 50 MG/1
50 TABLET, FILM COATED ORAL
Refills: 0 | Status: DISCONTINUED | COMMUNITY
End: 2024-04-24

## 2024-04-25 ENCOUNTER — TRANSCRIPTION ENCOUNTER (OUTPATIENT)
Age: 74
End: 2024-04-25

## 2024-04-25 ENCOUNTER — RESULT REVIEW (OUTPATIENT)
Age: 74
End: 2024-04-25

## 2024-04-25 ENCOUNTER — INPATIENT (INPATIENT)
Facility: HOSPITAL | Age: 74
LOS: 2 days | Discharge: HOME | DRG: 313 | End: 2024-04-28
Attending: INTERNAL MEDICINE | Admitting: INTERNAL MEDICINE
Payer: MEDICARE

## 2024-04-25 VITALS
TEMPERATURE: 98 F | HEIGHT: 69 IN | WEIGHT: 205.91 LBS | OXYGEN SATURATION: 98 % | SYSTOLIC BLOOD PRESSURE: 145 MMHG | RESPIRATION RATE: 18 BRPM | DIASTOLIC BLOOD PRESSURE: 55 MMHG | HEART RATE: 60 BPM

## 2024-04-25 DIAGNOSIS — Z98.890 OTHER SPECIFIED POSTPROCEDURAL STATES: Chronic | ICD-10-CM

## 2024-04-25 DIAGNOSIS — Z98.62 PERIPHERAL VASCULAR ANGIOPLASTY STATUS: Chronic | ICD-10-CM

## 2024-04-25 DIAGNOSIS — S98.139A COMPLETE TRAUMATIC AMPUTATION OF ONE UNSPECIFIED LESSER TOE, INITIAL ENCOUNTER: Chronic | ICD-10-CM

## 2024-04-25 DIAGNOSIS — R07.9 CHEST PAIN, UNSPECIFIED: ICD-10-CM

## 2024-04-25 LAB
ADD ON TEST-SPECIMEN IN LAB: SIGNIFICANT CHANGE UP
ALBUMIN SERPL ELPH-MCNC: 4.5 G/DL — SIGNIFICANT CHANGE UP (ref 3.3–5)
ALP SERPL-CCNC: 53 U/L — SIGNIFICANT CHANGE UP (ref 40–120)
ALT FLD-CCNC: 20 U/L — SIGNIFICANT CHANGE UP (ref 10–45)
ANION GAP SERPL CALC-SCNC: 12 MMOL/L — SIGNIFICANT CHANGE UP (ref 5–17)
APTT BLD: 36 SEC — HIGH (ref 24.5–35.6)
AST SERPL-CCNC: 23 U/L — SIGNIFICANT CHANGE UP (ref 10–40)
BASE EXCESS BLDV CALC-SCNC: 2.7 MMOL/L — SIGNIFICANT CHANGE UP (ref -2–3)
BASOPHILS # BLD AUTO: 0.04 K/UL — SIGNIFICANT CHANGE UP (ref 0–0.2)
BASOPHILS NFR BLD AUTO: 0.9 % — SIGNIFICANT CHANGE UP (ref 0–2)
BILIRUB SERPL-MCNC: 0.5 MG/DL — SIGNIFICANT CHANGE UP (ref 0.2–1.2)
BUN SERPL-MCNC: 33 MG/DL — HIGH (ref 7–23)
CA-I SERPL-SCNC: 1.3 MMOL/L — SIGNIFICANT CHANGE UP (ref 1.15–1.33)
CALCIUM SERPL-MCNC: 10.1 MG/DL — SIGNIFICANT CHANGE UP (ref 8.4–10.5)
CHLORIDE BLDV-SCNC: 105 MMOL/L — SIGNIFICANT CHANGE UP (ref 96–108)
CHLORIDE SERPL-SCNC: 105 MMOL/L — SIGNIFICANT CHANGE UP (ref 96–108)
CO2 BLDV-SCNC: 33 MMOL/L — HIGH (ref 22–26)
CO2 SERPL-SCNC: 24 MMOL/L — SIGNIFICANT CHANGE UP (ref 22–31)
CREAT SERPL-MCNC: 0.83 MG/DL — SIGNIFICANT CHANGE UP (ref 0.5–1.3)
EGFR: 93 ML/MIN/1.73M2 — SIGNIFICANT CHANGE UP
EOSINOPHIL # BLD AUTO: 0.08 K/UL — SIGNIFICANT CHANGE UP (ref 0–0.5)
EOSINOPHIL NFR BLD AUTO: 1.8 % — SIGNIFICANT CHANGE UP (ref 0–6)
GAS PNL BLDV: 139 MMOL/L — SIGNIFICANT CHANGE UP (ref 136–145)
GAS PNL BLDV: SIGNIFICANT CHANGE UP
GLUCOSE BLDV-MCNC: 89 MG/DL — SIGNIFICANT CHANGE UP (ref 70–99)
GLUCOSE SERPL-MCNC: 92 MG/DL — SIGNIFICANT CHANGE UP (ref 70–99)
HCO3 BLDV-SCNC: 31 MMOL/L — HIGH (ref 22–29)
HCT VFR BLD CALC: 42.8 % — SIGNIFICANT CHANGE UP (ref 39–50)
HCT VFR BLDA CALC: 43 % — SIGNIFICANT CHANGE UP (ref 39–51)
HGB BLD CALC-MCNC: 14.4 G/DL — SIGNIFICANT CHANGE UP (ref 12.6–17.4)
HGB BLD-MCNC: 14 G/DL — SIGNIFICANT CHANGE UP (ref 13–17)
IMM GRANULOCYTES NFR BLD AUTO: 0.4 % — SIGNIFICANT CHANGE UP (ref 0–0.9)
INR BLD: 1.09 RATIO — SIGNIFICANT CHANGE UP (ref 0.85–1.18)
LACTATE BLDV-MCNC: 1.1 MMOL/L — SIGNIFICANT CHANGE UP (ref 0.5–2)
LYMPHOCYTES # BLD AUTO: 0.94 K/UL — LOW (ref 1–3.3)
LYMPHOCYTES # BLD AUTO: 21 % — SIGNIFICANT CHANGE UP (ref 13–44)
MAGNESIUM SERPL-MCNC: 1.8 MG/DL — SIGNIFICANT CHANGE UP (ref 1.6–2.6)
MCHC RBC-ENTMCNC: 31 PG — SIGNIFICANT CHANGE UP (ref 27–34)
MCHC RBC-ENTMCNC: 32.7 GM/DL — SIGNIFICANT CHANGE UP (ref 32–36)
MCV RBC AUTO: 94.9 FL — SIGNIFICANT CHANGE UP (ref 80–100)
MONOCYTES # BLD AUTO: 0.53 K/UL — SIGNIFICANT CHANGE UP (ref 0–0.9)
MONOCYTES NFR BLD AUTO: 11.8 % — SIGNIFICANT CHANGE UP (ref 2–14)
NEUTROPHILS # BLD AUTO: 2.87 K/UL — SIGNIFICANT CHANGE UP (ref 1.8–7.4)
NEUTROPHILS NFR BLD AUTO: 64.1 % — SIGNIFICANT CHANGE UP (ref 43–77)
NRBC # BLD: 0 /100 WBCS — SIGNIFICANT CHANGE UP (ref 0–0)
NT-PROBNP SERPL-SCNC: 428 PG/ML — HIGH (ref 0–300)
PCO2 BLDV: 61 MMHG — HIGH (ref 42–55)
PH BLDV: 7.31 — LOW (ref 7.32–7.43)
PHOSPHATE SERPL-MCNC: 3 MG/DL — SIGNIFICANT CHANGE UP (ref 2.5–4.5)
PLATELET # BLD AUTO: 105 K/UL — LOW (ref 150–400)
PO2 BLDV: 17 MMHG — LOW (ref 25–45)
POTASSIUM BLDV-SCNC: 4.2 MMOL/L — SIGNIFICANT CHANGE UP (ref 3.5–5.1)
POTASSIUM SERPL-MCNC: 4.2 MMOL/L — SIGNIFICANT CHANGE UP (ref 3.5–5.3)
POTASSIUM SERPL-SCNC: 4.2 MMOL/L — SIGNIFICANT CHANGE UP (ref 3.5–5.3)
PROT SERPL-MCNC: 7.5 G/DL — SIGNIFICANT CHANGE UP (ref 6–8.3)
PROTHROM AB SERPL-ACNC: 12 SEC — SIGNIFICANT CHANGE UP (ref 9.5–13)
RBC # BLD: 4.51 M/UL — SIGNIFICANT CHANGE UP (ref 4.2–5.8)
RBC # FLD: 14.8 % — HIGH (ref 10.3–14.5)
SAO2 % BLDV: 18.5 % — LOW (ref 67–88)
SODIUM SERPL-SCNC: 141 MMOL/L — SIGNIFICANT CHANGE UP (ref 135–145)
TROPONIN T, HIGH SENSITIVITY RESULT: 26 NG/L — SIGNIFICANT CHANGE UP (ref 0–51)
TROPONIN T, HIGH SENSITIVITY RESULT: 33 NG/L — SIGNIFICANT CHANGE UP (ref 0–51)
TROPONIN T, HIGH SENSITIVITY RESULT: 37 NG/L — SIGNIFICANT CHANGE UP (ref 0–51)
WBC # BLD: 4.48 K/UL — SIGNIFICANT CHANGE UP (ref 3.8–10.5)
WBC # FLD AUTO: 4.48 K/UL — SIGNIFICANT CHANGE UP (ref 3.8–10.5)

## 2024-04-25 PROCEDURE — 71045 X-RAY EXAM CHEST 1 VIEW: CPT | Mod: 26

## 2024-04-25 PROCEDURE — 99223 1ST HOSP IP/OBS HIGH 75: CPT | Mod: 25

## 2024-04-25 PROCEDURE — 93016 CV STRESS TEST SUPVJ ONLY: CPT

## 2024-04-25 PROCEDURE — 93018 CV STRESS TEST I&R ONLY: CPT

## 2024-04-25 PROCEDURE — 93971 EXTREMITY STUDY: CPT | Mod: 26,RT

## 2024-04-25 PROCEDURE — 93010 ELECTROCARDIOGRAM REPORT: CPT

## 2024-04-25 PROCEDURE — 99285 EMERGENCY DEPT VISIT HI MDM: CPT | Mod: GC

## 2024-04-25 PROCEDURE — 78452 HT MUSCLE IMAGE SPECT MULT: CPT | Mod: 26

## 2024-04-25 RX ORDER — DEXTROSE 50 % IN WATER 50 %
12.5 SYRINGE (ML) INTRAVENOUS ONCE
Refills: 0 | Status: DISCONTINUED | OUTPATIENT
Start: 2024-04-25 | End: 2024-04-28

## 2024-04-25 RX ORDER — SODIUM CHLORIDE 9 MG/ML
1000 INJECTION, SOLUTION INTRAVENOUS
Refills: 0 | Status: DISCONTINUED | OUTPATIENT
Start: 2024-04-25 | End: 2024-04-28

## 2024-04-25 RX ORDER — DEXTROSE 10 % IN WATER 10 %
125 INTRAVENOUS SOLUTION INTRAVENOUS ONCE
Refills: 0 | Status: DISCONTINUED | OUTPATIENT
Start: 2024-04-25 | End: 2024-04-28

## 2024-04-25 RX ORDER — ATORVASTATIN CALCIUM 80 MG/1
20 TABLET, FILM COATED ORAL AT BEDTIME
Refills: 0 | Status: DISCONTINUED | OUTPATIENT
Start: 2024-04-25 | End: 2024-04-28

## 2024-04-25 RX ORDER — ASPIRIN/CALCIUM CARB/MAGNESIUM 324 MG
81 TABLET ORAL DAILY
Refills: 0 | Status: DISCONTINUED | OUTPATIENT
Start: 2024-04-25 | End: 2024-04-26

## 2024-04-25 RX ORDER — MAGNESIUM SULFATE 500 MG/ML
1 VIAL (ML) INJECTION ONCE
Refills: 0 | Status: COMPLETED | OUTPATIENT
Start: 2024-04-25 | End: 2024-04-25

## 2024-04-25 RX ORDER — INFLUENZA VIRUS VACCINE 15; 15; 15; 15 UG/.5ML; UG/.5ML; UG/.5ML; UG/.5ML
0.7 SUSPENSION INTRAMUSCULAR ONCE
Refills: 0 | Status: DISCONTINUED | OUTPATIENT
Start: 2024-04-25 | End: 2024-04-28

## 2024-04-25 RX ORDER — DEXTROSE 50 % IN WATER 50 %
25 SYRINGE (ML) INTRAVENOUS ONCE
Refills: 0 | Status: DISCONTINUED | OUTPATIENT
Start: 2024-04-25 | End: 2024-04-28

## 2024-04-25 RX ORDER — RIVAROXABAN 15 MG-20MG
20 KIT ORAL
Refills: 0 | Status: DISCONTINUED | OUTPATIENT
Start: 2024-04-25 | End: 2024-04-25

## 2024-04-25 RX ORDER — INSULIN LISPRO 100/ML
VIAL (ML) SUBCUTANEOUS
Refills: 0 | Status: DISCONTINUED | OUTPATIENT
Start: 2024-04-25 | End: 2024-04-28

## 2024-04-25 RX ORDER — DEXTROSE 50 % IN WATER 50 %
15 SYRINGE (ML) INTRAVENOUS ONCE
Refills: 0 | Status: DISCONTINUED | OUTPATIENT
Start: 2024-04-25 | End: 2024-04-28

## 2024-04-25 RX ORDER — ACETAMINOPHEN 500 MG
1000 TABLET ORAL ONCE
Refills: 0 | Status: COMPLETED | OUTPATIENT
Start: 2024-04-25 | End: 2024-04-25

## 2024-04-25 RX ORDER — FINASTERIDE 5 MG/1
5 TABLET, FILM COATED ORAL DAILY
Refills: 0 | Status: DISCONTINUED | OUTPATIENT
Start: 2024-04-25 | End: 2024-04-28

## 2024-04-25 RX ORDER — LISINOPRIL 2.5 MG/1
5 TABLET ORAL DAILY
Refills: 0 | Status: DISCONTINUED | OUTPATIENT
Start: 2024-04-25 | End: 2024-04-28

## 2024-04-25 RX ORDER — METOPROLOL TARTRATE 50 MG
50 TABLET ORAL DAILY
Refills: 0 | Status: DISCONTINUED | OUTPATIENT
Start: 2024-04-25 | End: 2024-04-25

## 2024-04-25 RX ORDER — GLUCAGON INJECTION, SOLUTION 0.5 MG/.1ML
1 INJECTION, SOLUTION SUBCUTANEOUS ONCE
Refills: 0 | Status: DISCONTINUED | OUTPATIENT
Start: 2024-04-25 | End: 2024-04-28

## 2024-04-25 RX ADMIN — Medication 400 MILLIGRAM(S): at 22:05

## 2024-04-25 RX ADMIN — RIVAROXABAN 20 MILLIGRAM(S): KIT at 17:42

## 2024-04-25 RX ADMIN — ATORVASTATIN CALCIUM 20 MILLIGRAM(S): 80 TABLET, FILM COATED ORAL at 22:05

## 2024-04-25 RX ADMIN — Medication 1000 MILLIGRAM(S): at 22:25

## 2024-04-25 RX ADMIN — Medication 100 GRAM(S): at 20:40

## 2024-04-25 NOTE — ED PROVIDER NOTE - ATTENDING CONTRIBUTION TO CARE
I, Ricardo Tavares, have performed a history and physical exam on this patient, and discussed their management with the resident. I have fully participated in the care of this patient. I agree with the history, physical exam, and plan as documented by the resident

## 2024-04-25 NOTE — CONSULT NOTE ADULT - ATTENDING COMMENTS
Patient with coronary artery disease presents with chest pain.  Nuclear stress test concerning for ischemia.  Will plan for coronary angiography tomorrow.

## 2024-04-25 NOTE — ED PROVIDER NOTE - OBJECTIVE STATEMENT
73-year-old male with past medical history of A-fib, HLD, HTN, CAD with 2 stents presenting due to chest pain for 2 days that worsens 2 hours ago.  Patient describes chest pain as crushing, in the center chest, and associate with some shortness of breath.  Patient states nothing makes the pain better or worse, and he does not notice increased symptoms with exertion.  Patient denies any fevers, chills, body aches, nausea, vomiting.

## 2024-04-25 NOTE — ED PROVIDER NOTE - PROGRESS NOTE DETAILS
I left a message at the office of the patient's cardiologist, Dr. Cabrera, to inform him the patient was at [facility] emergency department and that we are going to admit him.  I left my number for him to call me back.    Kalen Simmons MD (PGY 1)

## 2024-04-25 NOTE — ED PROVIDER NOTE - CLINICAL SUMMARY MEDICAL DECISION MAKING FREE TEXT BOX
Ricardo Tavares, DO: 74 yo M hx of CAD, stent x 2, afib on xarelto, neg cath April 2023, presenting for intermittent substernal CP, becoming more constant and at rest today. Took 325 mg ASA PTA. No cough or fevers. Exam as above. Concern for ACS, unstable angina. Will obtain cardiac biomarkers, screening lab work, maintain tele, CXR, provide symptomatic tx. Will reassess. Likely plan for admission for high risk CP.

## 2024-04-25 NOTE — H&P ADULT - NSHPPHYSICALEXAM_GEN_ALL_CORE
General: WN/WD NAD  PERRLA  Neurology: A&Ox3, nonfocal, ACOSTA x 4  Respiratory: CTA B/L  CV: RRR, S1S2, no murmurs, rubs or gallops  Abdominal: Soft, NT, ND +BS, Last BM  Extremities: No edema, + peripheral pulses  Skin Normal

## 2024-04-25 NOTE — CONSULT NOTE ADULT - ASSESSMENT
72M PMH of CAD ( 2020 s/p multivessel PCI to LAD and OM1), HTN, HLD, A fib dx 2016 (on Xarelto), T2DM, PVD/PAD s/p peripheral stent to Right leg (2022 on ASA only), left foot toe amputation (2nd, 3rd 4th digit amputation) presenting with chest pain.     EKG showing afib and no ST changes, trops 37-> 33. Nonexertional chest pain and negative trops concerning for unstable angina    Recommendations  -*Incomplete*  -Obtain nuclear stress test  -Monitor on tele  -Replete lytes K>4, Phos>3, Mg>2   72M PMH of CAD ( 2020 s/p multivessel PCI to LAD and OM1), HTN, HLD, A fib dx 2016 (on Xarelto), T2DM, PVD/PAD s/p peripheral stent to Right leg (2022 on ASA only), left foot toe amputation (2nd, 3rd 4th digit amputation) presenting with chest pain.     EKG showing afib and no ST changes, trops 37-> 33. Nonexertional chest pain and negative trops concerning for unstable angina    Recommendations  -Hold home metoprolol due to bradycardia   -Obtain nuclear stress test  -Monitor on tele  -Replete lytes K>4, Phos>3, Mg>2   72M PMH of CAD ( 2020 s/p multivessel PCI to LAD and OM1), HTN, HLD, A fib dx 2016 (on Xarelto), T2DM, PVD/PAD s/p peripheral stent to Right leg (2022 on ASA only), left foot toe amputation (2nd, 3rd 4th digit amputation) presenting with chest pain.     EKG showing afib and no ST changes, trops 37-> 33. Nonexertional chest pain and negative trops concerning for unstable angina    Recommendations  -Hold home metoprolol due to bradycardia   -Obtain nuclear stress test, will f/u results  -Monitor on tele  -Replete lytes K>4, Phos>3, Mg>2

## 2024-04-25 NOTE — CHART NOTE - NSCHARTNOTEFT_GEN_A_CORE
Notified by RN, patient c/o chest pain. VSS.     Patient seen at bedside. Patient c/o dull, aching chest pain, 7/10, which is the same pain he presented to the hospital for. Has been continuous since presentation, however has slightly worsened now. Denies provocative factors.   Patient is s/p nuclear stress test today with abnormal findings. Troponins negative x3.     - STAT EKG completed demonstrating atrial fibrillation w/ slow ventricular response, HR 54 bpm, RBBB, new T-wave inversion in V3. EKG discussed with cardiology fellow, Imer Bowers. Changes not requiring acute intervention.   - IV Tylenol ordered for pain  - Cardiology follow up in AM  - Will continue to monitor    Ruth Mendiola PA-C  Dept. of Medicine  Spectra [phone]

## 2024-04-25 NOTE — PATIENT PROFILE ADULT - FALL HARM RISK - HARM RISK INTERVENTIONS

## 2024-04-25 NOTE — PATIENT PROFILE ADULT - NSPROMEDSDISPOSITION_GEN_A_NUR
patient educated on not taking home medications while inpatient. Pt understands and states he'll only take meds administered by staff.   Meds at bedside: [place]/bedside

## 2024-04-25 NOTE — ED ADULT TRIAGE NOTE - CHIEF COMPLAINT QUOTE
chest pain x 2 days.   Endorses shortness of breath today- took baby aspirin prior to arrival.   Hx 2 cardiac stents, afib (on xarelto, baby aspirin)

## 2024-04-25 NOTE — CONSULT NOTE ADULT - SUBJECTIVE AND OBJECTIVE BOX
Patient seen and evaluated at bedside    Reason for consult:    HPI:  72M PMH of CAD ( 2020 s/p multivessel PCI to LAD and OM1), HTN, HLD, A fib dx 2016 (on Xarelto), T2DM, PVD/PAD s/p peripheral stent to Right leg (2022 on ASA only), left foot toe amputation (2nd, 3rd 4th digit amputation) presenting with chest pain. Patient states that 3 days ago began experiencing L sided chest pain described as burning sensation that would come and go. Resolved and then recurred yesterday where it persisted all day, patient states was mild. He followed up with  his cardiologist yesterday and reports that his EKG was normal at that time. Cardiologist recommended pt come to ED yesterday. Patient felt better and then went to bed, this AM patient experiencing same L sided chest pain, burning in nature, however now much worse than prior and persistent so presented to ED after taking 325 mg ASA. Denies shortness of breath during any episode. States that exertion does not exacerbate pain and all episodes began while at rest. Patient notes that takes metoprolol 50 mg at home and was told approximately a month ago to half dosage however still takes full dose.     In ED, VSS however bradycardic to high 40's. EKG showing afib rate 61. Trops 37-> 33.       PMHx:   HTN (hypertension)    HLD (hyperlipidemia)    Diabetes 1.5, managed as type 2    PVD (peripheral vascular disease)    Atrial fibrillation    CAD (coronary artery disease)    PVD (peripheral vascular disease)    PAD (peripheral artery disease)    H/O varicose veins    Skin ulcer of left great toe    Skin ulcer of ankle        PSHx:   No significant past surgical history    H/O congestive heart failure    No significant past surgical history    H/O cardiac catheterization    S/P peripheral artery angioplasty    Amputation of toe    History of prostate surgery        Allergies:  No Known Allergies      Home Meds:  -Xarelto 20 mg daily   -Metoprolol succinate 50 mg daily   -Atorvastatin 40 mg qd  -Ramipril 20 mg qd   -Tamsulosin 0.4 mg qd  -Finasteride 5 mg qd     FAMILY HISTORY:  FH: congestive heart failure  In Mother      Review of Systems:  REVIEW OF SYSTEMS:  CONSTITUTIONAL: No weakness, fevers or chills  EYES/ENT: No visual changes;  No dysphagia  NECK: No pain or stiffness  RESPIRATORY: No cough, wheezing, hemoptysis; No shortness of breath  CARDIOVASCULAR: Chest pain described as burning. No palpitations; No lower extremity edema  GASTROINTESTINAL: No abdominal or epigastric pain. No nausea, vomiting, or hematemesis; No diarrhea or constipation. No melena or hematochezia.  BACK: No back pain  GENITOURINARY: No dysuria, frequency or hematuria  NEUROLOGICAL: No numbness or weakness  SKIN: Bilateral lower extremity wounds  All other review of systems is negative unless indicated above.    [ ] All other systems negative  [ ] Unable to assess ROS due to    Physical Exam:  T(F): 97.8 (04-25), Max: 97.8 (04-25)  HR: 49 (04-25) (49 - 60)  BP: 128/68 (04-25) (128/68 - 145/55)  RR: 16 (04-25)  SpO2: 100% (04-25)  GENERAL: No acute distress, well-developed  HEAD:  Atraumatic, Normocephalic  ENT: EOMI, PERRLA, conjunctiva and sclera clear, Neck supple, No JVD, moist mucosa  CHEST/LUNG: Nontender to palpation, pain not exacerbated by positional changes. Clear to auscultation bilaterally; No wheeze, equal breath sounds bilaterally   BACK: No spinal tenderness  HEART: Regular rate and rhythm; No murmurs, rubs, or gallops  ABDOMEN: Soft, Nontender, Nondistended; Bowel sounds present  EXTREMITIES:  Bilateral lower extremity wounds consistent w/ history of PAD  PSYCH: Nl behavior, nl affect  NEUROLOGY: AAOx3, non-focal, cranial nerves intact      CXR: Personally reviewed    Labs: Personally reviewed                        14.0   4.48  )-----------( 105      ( 25 Apr 2024 07:42 )             42.8     04-25    141  |  105  |  33<H>  ----------------------------<  92  4.2   |  24  |  0.83    Ca    10.1      25 Apr 2024 07:42    TPro  7.5  /  Alb  4.5  /  TBili  0.5  /  DBili  x   /  AST  23  /  ALT  20  /  AlkPhos  53  04-25    PT/INR - ( 25 Apr 2024 07:42 )   PT: 12.0 sec;   INR: 1.09 ratio         PTT - ( 25 Apr 2024 07:42 )  PTT:36.0 sec      TTE 4/6/2023  TRANSTHORACIC ECHOCARDIOGRAM REPORT  ________________________________________________________________________________                                      _______       Pt. Name:       QUINCY MELLO Study Date:    4/6/2023  MRN:            [id number]               YOB: 1950  Accession #:    [id number]                Age:           72 years  Account#:       [id number]             Gender:        M  Heart Rate:     60 bpm                   Height:        68.00 in (172.72 cm)  Rhythm:         atrial fibrillation      Weight:        200.00 lb (90.72 kg)  Blood Pressure: 132/56 mmHg              BSA/BMI:       2.04 m² / 30.41 kg/m²  ________________________________________________________________________________________  Referring Physician:    [id number] Luigi Cabrera  Interpreting Physician: Candi Desouza  Primary Sonographer:    Johanna Darden [facility]    CPT:               ECHO TTE WO CON COMP W DOPP - 56105.m  Indication(s):     Chest pain, unspecified - R07.9  Procedure:         Transthoracic echocardiogram with 2-D, M-mode and complete                     spectral and color flow Doppler.  Ordering Location: [facility]/  Admission Status:  Outpatient  Study Information: Image quality for this study is adequate.    _______________________________________________________________________________________  CONCLUSIONS:      1. Normal left ventricular cavity size. The left ventricular wall thickness is normal. The left ventricular systolic function is normal with an ejection fraction of 74 % by Faye's method of disks. There are no regional wall motion abnormalities seen.   2. There is normal left ventricular diastolic function.   3. Normal right ventricular cavity size, normal wall thickness and normal systolic function. The tricuspid annular plane systolic excursion (TAPSE) is 2.3 cm (normal >=1.7 cm).   4. The aortic valve is tricuspid with normal structure without stenosis. There is focal calcification of the aortic valve leaflets. There is fibrocalcific aortic valve sclerosis without stenosis. The aortic peak velocity is 2.1 m/s. There is mild-to-moderate aortic regurgitation.   5. There is trace mitral and mild tricuspid regurgitation.   6. Pulmonary artery systolic pressure could not be estimated.   7. No pericardial effusion seen.   8. Compared to the transthoracic echocardiogram performed on 3/22/2022 there have been no significant interval changes.    ________________________________________________________________________________________  FINDINGS:  Left Ventricle:  Normal left ventricular cavity size. The left ventricular wall thickness is normal. The left ventricular systolic function is normal with a calculated ejection fraction of 74 % by the Faye's biplane method of disks. There are no regional wall motion abnormalities seen. There is normal left ventricular diastolic function, with normal filling pressure.     Right Ventricle:  Normal right ventricular cavity size, normal wall thickness and normal systolic function. Tricuspid annular plane systolic excursion (TAPSE) is 2.3 cm (normal >=1.7 cm).     Right Atrium:  The right atrium is severely dilated with an indexed volume of 40.36 ml/m² and an indexed area of 12.62 cm²/m².     Aortic Valve:  The aortic valve is tricuspid with normal structure without stenosis. There is focal calcification of the aortic valve leaflets. There is fibrocalcific aortic valve sclerosis without stenosis. The aortic peak velocity is 2.1 m/s. There is mild-to-moderate aortic regurgitation. AI VMax is 4.00 m/s. AI pressure half time is 559 msec. AI slope is 2.05 m/s².     Mitral Valve:  Structurally normal mitral valve with normal leaflet excursion. There is trace mitral regurgitation.     Tricuspid Valve:  Structurally normal tricuspid valve with normal leaflet excursion. There is mild tricuspid regurgitation. There is insufficient tricuspid regurgitation detected to calculate pulmonary artery systolic pressure.     Pulmonic Valve:  Structurally normal pulmonic valve with normal leaflet excursion. There is mild pulmonic regurgitation.     Aorta:  The aortic annulus and aortic root appear normal in size.     Pericardium:  No pericardial effusion seen.  ____________________________________________________________________  QUANTITATIVE DATA  Left Ventricle Measurements                         Indexed to BSA  IVSd (2D):   1.8 cm  LVPWd (2D):  1.3 cm  LVIDd (2D):  4.6 cm  LVIDs (2D):  2.5 cm  LV Mass:     310 g  151.8 g/m²  LV Vol d, A2C: 58.8 ml 28.77 ml/m²  LV Vol d, A4C: 88.9 ml 43.49 ml/m²  LV Vol d, BP:  73.4 ml 35.90 ml/m²  LV Vol s, A2C: 14.4 ml 7.05 ml/m²  LV Vol s, A4C: 20.9 ml 10.23 ml/m²  LV Vol s, BP:  18.8 ml 9.21 ml/m²  LV SV BP:      54.5 ml  LV EF BP:      74 %     MV E Vmax:    0.92 m/s  e' lateral:   14.00 cm/s  e' medial:    9.00 cm/s  E/e' lateral: 6.59  E/e' medial:  10.24  E/e' Average: 8.02    Aorta Measurements     Ao Root at STJ: 3.5 cm       Left Atrium Measurements     LA Diam 2D: 6.30    Right Ventricle Measurements Right Atrial Measurements     TAPSE: 2.3 cm                RA Vol:       82.50 ml                               RA Vol Index: 40.36 ml/m²    LVOT / RVOT/ Qp/Qs Data:  LVOT Diameter:   2.20 cm  LVOT Vmax:       1.23 m/s  LVOT VTI:        25.70 cm  LVOT SV:         97.7 ml  LVOT SV Indexed: 47.80 ml/m²  RVOT VTI:        18.03 cm    Mitral Valve Measurements     MV E Vmax: 0.9 m/s       ________________________________________________________________________________________  Electronically signed by Candi Desouza on 4/6/2023 at 4:52:40 PM      Patient seen and evaluated at bedside    Reason for consult:    HPI:  72M PMH of CAD ( 2020 s/p multivessel PCI to LAD and OM1), HTN, HLD, A fib dx 2016 (on Xarelto), T2DM, PVD/PAD s/p peripheral stent to Right leg (2022 on ASA only), left foot toe amputation (2nd, 3rd 4th digit amputation) presenting with chest pain. Patient states that 3 days ago began experiencing L sided chest pain described as burning sensation that would come and go. Notes that day before ate a lot of spicy food. Chest pain resolved and then recurred yesterday and persisted all day, patient states was mild. He followed up with  his cardiologist yesterday and reports that his EKG was normal at that time. Cardiologist recommended pt come to ED yesterday. Patient felt better and then went to bed, this AM patient experiencing same L sided chest pain, burning in nature, however now much worse than prior and persistent so presented to ED after taking 325 mg ASA. Denies shortness of breath during any episode. States that exertion does not exacerbate pain and all episodes began while at rest. Patient notes that takes metoprolol 50 mg at home and was told approximately a month ago to half dosage however still takes full dose.     In ED, VSS however bradycardic to high 40's. EKG showing afib rate 61. Trops 37-> 33.       PMHx:   HTN (hypertension)    HLD (hyperlipidemia)    Diabetes 1.5, managed as type 2    PVD (peripheral vascular disease)    Atrial fibrillation    CAD (coronary artery disease)    PVD (peripheral vascular disease)    PAD (peripheral artery disease)    H/O varicose veins    Skin ulcer of left great toe    Skin ulcer of ankle        PSHx:   No significant past surgical history    H/O congestive heart failure    No significant past surgical history    H/O cardiac catheterization    S/P peripheral artery angioplasty    Amputation of toe    History of prostate surgery        Allergies:  No Known Allergies      Home Meds:  -Xarelto 20 mg daily   -Metoprolol succinate 50 mg daily   -Atorvastatin 40 mg qd  -Ramipril 20 mg qd   -Tamsulosin 0.4 mg qd  -Finasteride 5 mg qd     FAMILY HISTORY:  FH: congestive heart failure  In Mother      Review of Systems:  REVIEW OF SYSTEMS:  CONSTITUTIONAL: No weakness, fevers or chills  EYES/ENT: No visual changes;  No dysphagia  NECK: No pain or stiffness  RESPIRATORY: No cough, wheezing, hemoptysis; No shortness of breath  CARDIOVASCULAR: Chest pain described as burning. No palpitations; No lower extremity edema  GASTROINTESTINAL: No abdominal or epigastric pain. No nausea, vomiting, or hematemesis; No diarrhea or constipation. No melena or hematochezia.  BACK: No back pain  GENITOURINARY: No dysuria, frequency or hematuria  NEUROLOGICAL: No numbness or weakness  SKIN: Bilateral lower extremity wounds  All other review of systems is negative unless indicated above.    [ ] All other systems negative  [ ] Unable to assess ROS due to    Physical Exam:  T(F): 97.8 (04-25), Max: 97.8 (04-25)  HR: 49 (04-25) (49 - 60)  BP: 128/68 (04-25) (128/68 - 145/55)  RR: 16 (04-25)  SpO2: 100% (04-25)  GENERAL: No acute distress, well-developed  HEAD:  Atraumatic, Normocephalic  ENT: EOMI, PERRLA, conjunctiva and sclera clear, Neck supple, No JVD, moist mucosa  CHEST/LUNG: Nontender to palpation, pain not exacerbated by positional changes. Clear to auscultation bilaterally; No wheeze, equal breath sounds bilaterally   BACK: No spinal tenderness  HEART: Regular rate and rhythm; No murmurs, rubs, or gallops  ABDOMEN: Soft, Nontender, Nondistended; Bowel sounds present  EXTREMITIES:  Bilateral lower extremity wounds consistent w/ history of PAD  PSYCH: Nl behavior, nl affect  NEUROLOGY: AAOx3, non-focal, cranial nerves intact      CXR: Personally reviewed    Labs: Personally reviewed                        14.0   4.48  )-----------( 105      ( 25 Apr 2024 07:42 )             42.8     04-25    141  |  105  |  33<H>  ----------------------------<  92  4.2   |  24  |  0.83    Ca    10.1      25 Apr 2024 07:42    TPro  7.5  /  Alb  4.5  /  TBili  0.5  /  DBili  x   /  AST  23  /  ALT  20  /  AlkPhos  53  04-25    PT/INR - ( 25 Apr 2024 07:42 )   PT: 12.0 sec;   INR: 1.09 ratio         PTT - ( 25 Apr 2024 07:42 )  PTT:36.0 sec      TTE 4/6/2023  TRANSTHORACIC ECHOCARDIOGRAM REPORT  ________________________________________________________________________________                                      _______       Pt. Name:       QUINCY MLELO Study Date:    4/6/2023  MRN:            [id number]               YOB: 1950  Accession #:    [id number]                Age:           72 years  Account#:       [id number]             Gender:        M  Heart Rate:     60 bpm                   Height:        68.00 in (172.72 cm)  Rhythm:         atrial fibrillation      Weight:        200.00 lb (90.72 kg)  Blood Pressure: 132/56 mmHg              BSA/BMI:       2.04 m² / 30.41 kg/m²  ________________________________________________________________________________________  Referring Physician:    [id number] Luigi Cabrera  Interpreting Physician: Candi Desouza  Primary Sonographer:    Johanna Darden [facility]    CPT:               ECHO TTE WO CON COMP W DOPP - 23496.m  Indication(s):     Chest pain, unspecified - R07.9  Procedure:         Transthoracic echocardiogram with 2-D, M-mode and complete                     spectral and color flow Doppler.  Ordering Location: [facility]/  Admission Status:  Outpatient  Study Information: Image quality for this study is adequate.    _______________________________________________________________________________________  CONCLUSIONS:      1. Normal left ventricular cavity size. The left ventricular wall thickness is normal. The left ventricular systolic function is normal with an ejection fraction of 74 % by Faye's method of disks. There are no regional wall motion abnormalities seen.   2. There is normal left ventricular diastolic function.   3. Normal right ventricular cavity size, normal wall thickness and normal systolic function. The tricuspid annular plane systolic excursion (TAPSE) is 2.3 cm (normal >=1.7 cm).   4. The aortic valve is tricuspid with normal structure without stenosis. There is focal calcification of the aortic valve leaflets. There is fibrocalcific aortic valve sclerosis without stenosis. The aortic peak velocity is 2.1 m/s. There is mild-to-moderate aortic regurgitation.   5. There is trace mitral and mild tricuspid regurgitation.   6. Pulmonary artery systolic pressure could not be estimated.   7. No pericardial effusion seen.   8. Compared to the transthoracic echocardiogram performed on 3/22/2022 there have been no significant interval changes.    ________________________________________________________________________________________  FINDINGS:  Left Ventricle:  Normal left ventricular cavity size. The left ventricular wall thickness is normal. The left ventricular systolic function is normal with a calculated ejection fraction of 74 % by the Faye's biplane method of disks. There are no regional wall motion abnormalities seen. There is normal left ventricular diastolic function, with normal filling pressure.     Right Ventricle:  Normal right ventricular cavity size, normal wall thickness and normal systolic function. Tricuspid annular plane systolic excursion (TAPSE) is 2.3 cm (normal >=1.7 cm).     Right Atrium:  The right atrium is severely dilated with an indexed volume of 40.36 ml/m² and an indexed area of 12.62 cm²/m².     Aortic Valve:  The aortic valve is tricuspid with normal structure without stenosis. There is focal calcification of the aortic valve leaflets. There is fibrocalcific aortic valve sclerosis without stenosis. The aortic peak velocity is 2.1 m/s. There is mild-to-moderate aortic regurgitation. AI VMax is 4.00 m/s. AI pressure half time is 559 msec. AI slope is 2.05 m/s².     Mitral Valve:  Structurally normal mitral valve with normal leaflet excursion. There is trace mitral regurgitation.     Tricuspid Valve:  Structurally normal tricuspid valve with normal leaflet excursion. There is mild tricuspid regurgitation. There is insufficient tricuspid regurgitation detected to calculate pulmonary artery systolic pressure.     Pulmonic Valve:  Structurally normal pulmonic valve with normal leaflet excursion. There is mild pulmonic regurgitation.     Aorta:  The aortic annulus and aortic root appear normal in size.     Pericardium:  No pericardial effusion seen.  ____________________________________________________________________  QUANTITATIVE DATA  Left Ventricle Measurements                         Indexed to BSA  IVSd (2D):   1.8 cm  LVPWd (2D):  1.3 cm  LVIDd (2D):  4.6 cm  LVIDs (2D):  2.5 cm  LV Mass:     310 g  151.8 g/m²  LV Vol d, A2C: 58.8 ml 28.77 ml/m²  LV Vol d, A4C: 88.9 ml 43.49 ml/m²  LV Vol d, BP:  73.4 ml 35.90 ml/m²  LV Vol s, A2C: 14.4 ml 7.05 ml/m²  LV Vol s, A4C: 20.9 ml 10.23 ml/m²  LV Vol s, BP:  18.8 ml 9.21 ml/m²  LV SV BP:      54.5 ml  LV EF BP:      74 %     MV E Vmax:    0.92 m/s  e' lateral:   14.00 cm/s  e' medial:    9.00 cm/s  E/e' lateral: 6.59  E/e' medial:  10.24  E/e' Average: 8.02    Aorta Measurements     Ao Root at STJ: 3.5 cm       Left Atrium Measurements     LA Diam 2D: 6.30    Right Ventricle Measurements Right Atrial Measurements     TAPSE: 2.3 cm                RA Vol:       82.50 ml                               RA Vol Index: 40.36 ml/m²    LVOT / RVOT/ Qp/Qs Data:  LVOT Diameter:   2.20 cm  LVOT Vmax:       1.23 m/s  LVOT VTI:        25.70 cm  LVOT SV:         97.7 ml  LVOT SV Indexed: 47.80 ml/m²  RVOT VTI:        18.03 cm    Mitral Valve Measurements     MV E Vmax: 0.9 m/s       ________________________________________________________________________________________  Electronically signed by Candi Desouza on 4/6/2023 at 4:52:40 PM

## 2024-04-25 NOTE — H&P ADULT - NSHPLABSRESULTS_GEN_ALL_CORE
Lab Results:  CBC  CBC Full  -  ( 25 Apr 2024 07:42 )  WBC Count : 4.48 K/uL  RBC Count : 4.51 M/uL  Hemoglobin : 14.0 g/dL  Hematocrit : 42.8 %  Platelet Count - Automated : 105 K/uL  Mean Cell Volume : 94.9 fl  Mean Cell Hemoglobin : 31.0 pg  Mean Cell Hemoglobin Concentration : 32.7 gm/dL  Auto Neutrophil # : 2.87 K/uL  Auto Lymphocyte # : 0.94 K/uL  Auto Monocyte # : 0.53 K/uL  Auto Eosinophil # : 0.08 K/uL  Auto Basophil # : 0.04 K/uL  Auto Neutrophil % : 64.1 %  Auto Lymphocyte % : 21.0 %  Auto Monocyte % : 11.8 %  Auto Eosinophil % : 1.8 %  Auto Basophil % : 0.9 %    .		Differential:	[] Automated		[] Manual  Chemistry                        14.0   4.48  )-----------( 105      ( 25 Apr 2024 07:42 )             42.8     04-25    141  |  105  |  33<H>  ----------------------------<  92  4.2   |  24  |  0.83    Ca    10.1      25 Apr 2024 07:42    TPro  7.5  /  Alb  4.5  /  TBili  0.5  /  DBili  x   /  AST  23  /  ALT  20  /  AlkPhos  53  04-25    LIVER FUNCTIONS - ( 25 Apr 2024 07:42 )  Alb: 4.5 g/dL / Pro: 7.5 g/dL / ALK PHOS: 53 U/L / ALT: 20 U/L / AST: 23 U/L / GGT: x           PT/INR - ( 25 Apr 2024 07:42 )   PT: 12.0 sec;   INR: 1.09 ratio         PTT - ( 25 Apr 2024 07:42 )  PTT:36.0 sec  Urinalysis Basic - ( 25 Apr 2024 07:42 )    Color: x / Appearance: x / SG: x / pH: x  Gluc: 92 mg/dL / Ketone: x  / Bili: x / Urobili: x   Blood: x / Protein: x / Nitrite: x   Leuk Esterase: x / RBC: x / WBC x   Sq Epi: x / Non Sq Epi: x / Bacteria: x            MICROBIOLOGY/CULTURES:      RADIOLOGY RESULTS: reviewed

## 2024-04-25 NOTE — H&P ADULT - ASSESSMENT
73-year-old male with past medical history of A-fib, HLD, HTN, CAD with 2 stents presenting due to chest pain for 2 days that worsens 2 hours ago.  Patient describes chest pain as crushing, in the center chest, and associate with some shortness of breath.  Patient states nothing makes the pain better or worse, and he does not notice increased symptoms with exertion.  Patient denies any fevers, chills, body aches, nausea, vomiting.    Chest Pain  -Hold home metoprolol due to bradycardia   -Obtain nuclear stress test, will f/u results  -Monitor on tele  -Replete lytes K>4, Phos>3, Mg>2      Afib  - cw xarelto     CAD, PCI  - cards fu   - check stress test   - cw asa, ststin  - hold BB

## 2024-04-25 NOTE — ED ADULT NURSE NOTE - OBJECTIVE STATEMENT
73 y.o male, A&Ox4, PMH A-fib, HLD, HTN, CAD with 2 stents, pt presents to ED c/o chest pain. pt states he has been having chest pain for 2 days described as constant pressure and pain. pt states it worsened over the last 2 hours. pt states nothing makes it better or worse, to states he never felt this type of chest pain before. pt denies N/V/D, fevers, chills, abdominal pain. IV access established, cardiac monitor placed, pt safety and comfort provided.

## 2024-04-26 ENCOUNTER — RESULT REVIEW (OUTPATIENT)
Age: 74
End: 2024-04-26

## 2024-04-26 LAB
A1C WITH ESTIMATED AVERAGE GLUCOSE RESULT: 6 % — HIGH (ref 4–5.6)
ANION GAP SERPL CALC-SCNC: 9 MMOL/L — SIGNIFICANT CHANGE UP (ref 5–17)
BUN SERPL-MCNC: 27 MG/DL — HIGH (ref 7–23)
CALCIUM SERPL-MCNC: 9.3 MG/DL — SIGNIFICANT CHANGE UP (ref 8.4–10.5)
CHLORIDE SERPL-SCNC: 107 MMOL/L — SIGNIFICANT CHANGE UP (ref 96–108)
CO2 SERPL-SCNC: 24 MMOL/L — SIGNIFICANT CHANGE UP (ref 22–31)
CREAT SERPL-MCNC: 0.96 MG/DL — SIGNIFICANT CHANGE UP (ref 0.5–1.3)
EGFR: 84 ML/MIN/1.73M2 — SIGNIFICANT CHANGE UP
ESTIMATED AVERAGE GLUCOSE: 126 MG/DL — HIGH (ref 68–114)
GLUCOSE BLDC GLUCOMTR-MCNC: 103 MG/DL — HIGH (ref 70–99)
GLUCOSE BLDC GLUCOMTR-MCNC: 123 MG/DL — HIGH (ref 70–99)
GLUCOSE BLDC GLUCOMTR-MCNC: 140 MG/DL — HIGH (ref 70–99)
GLUCOSE BLDC GLUCOMTR-MCNC: 152 MG/DL — HIGH (ref 70–99)
GLUCOSE SERPL-MCNC: 120 MG/DL — HIGH (ref 70–99)
MAGNESIUM SERPL-MCNC: 2 MG/DL — SIGNIFICANT CHANGE UP (ref 1.6–2.6)
PHOSPHATE SERPL-MCNC: 3.5 MG/DL — SIGNIFICANT CHANGE UP (ref 2.5–4.5)
POTASSIUM SERPL-MCNC: 4.4 MMOL/L — SIGNIFICANT CHANGE UP (ref 3.5–5.3)
POTASSIUM SERPL-SCNC: 4.4 MMOL/L — SIGNIFICANT CHANGE UP (ref 3.5–5.3)
SODIUM SERPL-SCNC: 140 MMOL/L — SIGNIFICANT CHANGE UP (ref 135–145)

## 2024-04-26 PROCEDURE — 99152 MOD SED SAME PHYS/QHP 5/>YRS: CPT

## 2024-04-26 PROCEDURE — 93321 DOPPLER ECHO F-UP/LMTD STD: CPT | Mod: 26

## 2024-04-26 PROCEDURE — 93010 ELECTROCARDIOGRAM REPORT: CPT

## 2024-04-26 PROCEDURE — 93571 IV DOP VEL&/PRESS C FLO 1ST: CPT | Mod: 26,LD

## 2024-04-26 PROCEDURE — 93308 TTE F-UP OR LMTD: CPT | Mod: 26

## 2024-04-26 PROCEDURE — 92928 PRQ TCAT PLMT NTRAC ST 1 LES: CPT | Mod: LD

## 2024-04-26 PROCEDURE — 99233 SBSQ HOSP IP/OBS HIGH 50: CPT

## 2024-04-26 PROCEDURE — 93458 L HRT ARTERY/VENTRICLE ANGIO: CPT | Mod: 26,59

## 2024-04-26 RX ORDER — CLOPIDOGREL BISULFATE 75 MG/1
75 TABLET, FILM COATED ORAL DAILY
Refills: 0 | Status: DISCONTINUED | OUTPATIENT
Start: 2024-04-27 | End: 2024-04-28

## 2024-04-26 RX ORDER — SODIUM CHLORIDE 9 MG/ML
1000 INJECTION INTRAMUSCULAR; INTRAVENOUS; SUBCUTANEOUS
Refills: 0 | Status: DISCONTINUED | OUTPATIENT
Start: 2024-04-26 | End: 2024-04-28

## 2024-04-26 RX ORDER — SODIUM CHLORIDE 9 MG/ML
250 INJECTION INTRAMUSCULAR; INTRAVENOUS; SUBCUTANEOUS ONCE
Refills: 0 | Status: COMPLETED | OUTPATIENT
Start: 2024-04-26 | End: 2024-04-26

## 2024-04-26 RX ORDER — ASPIRIN/CALCIUM CARB/MAGNESIUM 324 MG
81 TABLET ORAL DAILY
Refills: 0 | Status: DISCONTINUED | OUTPATIENT
Start: 2024-04-27 | End: 2024-04-28

## 2024-04-26 RX ADMIN — LISINOPRIL 5 MILLIGRAM(S): 2.5 TABLET ORAL at 05:56

## 2024-04-26 RX ADMIN — ATORVASTATIN CALCIUM 20 MILLIGRAM(S): 80 TABLET, FILM COATED ORAL at 21:23

## 2024-04-26 RX ADMIN — SODIUM CHLORIDE 75 MILLILITER(S): 9 INJECTION INTRAMUSCULAR; INTRAVENOUS; SUBCUTANEOUS at 17:20

## 2024-04-26 RX ADMIN — Medication 30 MILLILITER(S): at 16:37

## 2024-04-26 RX ADMIN — FINASTERIDE 5 MILLIGRAM(S): 5 TABLET, FILM COATED ORAL at 12:07

## 2024-04-26 RX ADMIN — SODIUM CHLORIDE 75 MILLILITER(S): 9 INJECTION INTRAMUSCULAR; INTRAVENOUS; SUBCUTANEOUS at 12:58

## 2024-04-26 RX ADMIN — SODIUM CHLORIDE 750 MILLILITER(S): 9 INJECTION INTRAMUSCULAR; INTRAVENOUS; SUBCUTANEOUS at 12:24

## 2024-04-26 RX ADMIN — Medication 81 MILLIGRAM(S): at 12:07

## 2024-04-26 NOTE — CHART NOTE - NSCHARTNOTEFT_GEN_A_CORE
Notified by RN that Pt's HR into the 30s on tele while sleeping, with improvement into 50s when awoken. All other vitals stable.   Patient was bradycardic into the 40s in ED. Home Torpol XL 50mg daily being held.   Pt assessed at bedside. A&Ox3. Pt is currently asymptomatic w/ no current complains. Denies chest pain, SOB, dizziness.          - EKG from this evening (4/25/24 09:08PM) showed atrial fibrillation w/ slow ventricular response, HR 54 bpm, RBBB, new T-wave inversion in V3.   - Discussed with cardiology fellow, Imer Bowers. Bradycardia into 30s like vagally mediated iso pt sleeping. No need to repeat EKG now. Will repeat STAT/call RRT if HR goes lower, patient becomes symptomatic, or becomes hemodynamically unstable.   - Keep R2 pads at bedside.   - Will check lytes and replete if needed.  - Continue with telemetry monitoring  - Cardiology f/u in AM  - Will continue to monitor  - Will endorse to AM team. Attending to follow.     Ruth Mendiola PA-C  Dept. of Medicine  Spectra [phone] Notified by RN that Pt's HR into the 30s on tele while sleeping, with improvement into 50s when awoken. All other vitals stable.   Patient was bradycardic into the 40s in ED. Home Torpol XL 50mg daily being held.   Pt assessed at bedside. A&Ox3. Pt is currently asymptomatic w/ no current complains. Denies chest pain, SOB, dizziness.    Vital Signs Last 24 Hrs  T(C): 36.7 (25 Apr 2024 20:25), Max: 36.7 (25 Apr 2024 20:25)  T(F): 98 (25 Apr 2024 20:25), Max: 98 (25 Apr 2024 20:25)  HR: 51 (26 Apr 2024 00:40) (49 - 66)  BP: 104/50 (26 Apr 2024 00:40) (104/50 - 145/55)  BP(mean): --  RR: 18 (26 Apr 2024 00:40) (16 - 18)  SpO2: 96% (26 Apr 2024 00:40) (95% - 100%)    Parameters below as of 26 Apr 2024 00:40  Patient On (Oxygen Delivery Method): room air      - EKG from this evening (4/25/24 09:08PM) showed atrial fibrillation w/ slow ventricular response, HR 54 bpm, RBBB, new T-wave inversion in V3.   - Discussed with cardiology fellow, Imer Bowers. Bradycardia into 30s like vagally mediated iso pt sleeping. No need to repeat EKG now. Will repeat STAT/call RRT if HR goes lower, patient becomes symptomatic, or becomes hemodynamically unstable.   - Keep R2 pads at bedside.   - Will check lytes and replete if needed.  - Continue with telemetry monitoring  - Cardiology f/u in AM  - Will continue to monitor  - Will endorse to AM team. Attending to follow.     Ruth Mendiola PA-C  Dept. of Medicine  Spectra [phone]

## 2024-04-26 NOTE — PROGRESS NOTE ADULT - SUBJECTIVE AND OBJECTIVE BOX
Patient is a 73y old  Male who presents with a chief complaint of chest pain (26 Apr 2024 10:29)    Date of servie : 04-26-24 @ 13:01  INTERVAL HPI/OVERNIGHT EVENTS:  T(C): 36.3 (04-26-24 @ 12:03), Max: 36.9 (04-26-24 @ 10:30)  HR: 58 (04-26-24 @ 12:03) (51 - 66)  BP: 158/63 (04-26-24 @ 12:03) (104/50 - 158/63)  RR: 18 (04-26-24 @ 12:03) (17 - 18)  SpO2: 100% (04-26-24 @ 12:03) (95% - 100%)  Wt(kg): --  I&O's Summary    25 Apr 2024 07:01  -  26 Apr 2024 07:00  --------------------------------------------------------  IN: 360 mL / OUT: 0 mL / NET: 360 mL    26 Apr 2024 07:01  -  26 Apr 2024 13:01  --------------------------------------------------------  IN: 240 mL / OUT: 0 mL / NET: 240 mL        LABS:                        14.0   4.48  )-----------( 105      ( 25 Apr 2024 07:42 )             42.8     04-26    140  |  107  |  27<H>  ----------------------------<  120<H>  4.4   |  24  |  0.96    Ca    9.3      26 Apr 2024 03:15  Phos  3.5     04-26  Mg     2.0     04-26    TPro  7.5  /  Alb  4.5  /  TBili  0.5  /  DBili  x   /  AST  23  /  ALT  20  /  AlkPhos  53  04-25    PT/INR - ( 25 Apr 2024 07:42 )   PT: 12.0 sec;   INR: 1.09 ratio         PTT - ( 25 Apr 2024 07:42 )  PTT:36.0 sec  Urinalysis Basic - ( 26 Apr 2024 03:15 )    Color: x / Appearance: x / SG: x / pH: x  Gluc: 120 mg/dL / Ketone: x  / Bili: x / Urobili: x   Blood: x / Protein: x / Nitrite: x   Leuk Esterase: x / RBC: x / WBC x   Sq Epi: x / Non Sq Epi: x / Bacteria: x      CAPILLARY BLOOD GLUCOSE      POCT Blood Glucose.: 140 mg/dL (26 Apr 2024 11:42)  POCT Blood Glucose.: 123 mg/dL (26 Apr 2024 08:01)        Urinalysis Basic - ( 26 Apr 2024 03:15 )    Color: x / Appearance: x / SG: x / pH: x  Gluc: 120 mg/dL / Ketone: x  / Bili: x / Urobili: x   Blood: x / Protein: x / Nitrite: x   Leuk Esterase: x / RBC: x / WBC x   Sq Epi: x / Non Sq Epi: x / Bacteria: x        MEDICATIONS  (STANDING):  aspirin enteric coated 81 milliGRAM(s) Oral daily  atorvastatin 20 milliGRAM(s) Oral at bedtime  dextrose 10% Bolus 125 milliLiter(s) IV Bolus once  dextrose 5%. 1000 milliLiter(s) (50 mL/Hr) IV Continuous <Continuous>  dextrose 5%. 1000 milliLiter(s) (100 mL/Hr) IV Continuous <Continuous>  dextrose 50% Injectable 12.5 Gram(s) IV Push once  dextrose 50% Injectable 25 Gram(s) IV Push once  finasteride 5 milliGRAM(s) Oral daily  glucagon  Injectable 1 milliGRAM(s) IntraMuscular once  influenza  Vaccine (HIGH DOSE) 0.7 milliLiter(s) IntraMuscular once  insulin lispro (ADMELOG) corrective regimen sliding scale   SubCutaneous three times a day before meals  lisinopril 5 milliGRAM(s) Oral daily  sodium chloride 0.9%. 1000 milliLiter(s) (75 mL/Hr) IV Continuous <Continuous>    MEDICATIONS  (PRN):  dextrose Oral Gel 15 Gram(s) Oral once PRN Blood Glucose LESS THAN 70 milliGRAM(s)/deciliter          PHYSICAL EXAM:  GENERAL: NAD, well-groomed, well-developed  HEAD:  Atraumatic, Normocephalic  CHEST/LUNG: Clear to percussion bilaterally; No rales, rhonchi, wheezing, or rubs  HEART: Regular rate and rhythm; No murmurs, rubs, or gallops  ABDOMEN: Soft, Nontender, Nondistended; Bowel sounds present  EXTREMITIES:  2+ Peripheral Pulses, No clubbing, cyanosis, or edema  LYMPH: No lymphadenopathy noted  SKIN: No rashes or lesions    Care Discussed with Consultants/Other Providers [ ] YES  [ ] NO

## 2024-04-26 NOTE — PROGRESS NOTE ADULT - SUBJECTIVE AND OBJECTIVE BOX
Patient seen and examined at bedside.    Overnight Events: Overnight patient had episode of chest pain 7/10, states same in nature but more severe compared to prior chest pain. ECG done showing afib rate 54, no interventions. Patient states still experiencing L sided chest discomfort however mild in nature.    Telemetry: Afib with slow ventricular response rates range 30s-60s on tele     REVIEW OF SYSTEMS:  Constitutional:     [ x] negative [ ] fevers [ ] chills [ ] weight loss [ ] weight gain  HEENT:                  [ x] negative [ ] dry eyes [ ] eye irritation [ ] postnasal drip [ ] nasal congestion  CV:                         [x ] negative  [ ] chest pain [ ] orthopnea [ ] palpitations [ ] murmur  Resp:                     [ x] negative [ ] cough [ ] shortness of breath [ ] dyspnea [ ] wheezing [ ] sputum [ ]hemoptysis  GI:                          [ x] negative [ ] nausea [ ] vomiting [ ] diarrhea [ ] constipation [ ] abd pain [ ] dysphagia   :                        [ x] negative [ ] dysuria [ ] nocturia [ ] hematuria [ ] increased urinary frequency  Musculoskeletal: [ x] negative [ ] back pain [ ] myalgias [ ] arthralgias [ ] fracture  Skin:                       [ x] negative [ ] rash [ ] itch  Neurological:        [ x] negative [ ] headache [ ] dizziness [ ] syncope [ ] weakness [ ] numbness  Psychiatric:           [ x] negative [ ] anxiety [ ] depression  Endocrine:            [ x] negative [ ] diabetes [ ] thyroid problem  Heme/Lymph:      [ x] negative [ ] anemia [ ] bleeding problem  Allergic/Immune: [x ] negative [ ] itchy eyes [ ] nasal discharge [ ] hives [ ] angioedema    [x ] All other systems negative  [ ] Unable to assess ROS due to    Current Meds:  aspirin enteric coated 81 milliGRAM(s) Oral daily  atorvastatin 20 milliGRAM(s) Oral at bedtime  dextrose 10% Bolus 125 milliLiter(s) IV Bolus once  dextrose 5%. 1000 milliLiter(s) IV Continuous <Continuous>  dextrose 5%. 1000 milliLiter(s) IV Continuous <Continuous>  dextrose 50% Injectable 25 Gram(s) IV Push once  dextrose 50% Injectable 12.5 Gram(s) IV Push once  dextrose Oral Gel 15 Gram(s) Oral once PRN  finasteride 5 milliGRAM(s) Oral daily  glucagon  Injectable 1 milliGRAM(s) IntraMuscular once  influenza  Vaccine (HIGH DOSE) 0.7 milliLiter(s) IntraMuscular once  insulin lispro (ADMELOG) corrective regimen sliding scale   SubCutaneous three times a day before meals  lisinopril 5 milliGRAM(s) Oral daily      PAST MEDICAL & SURGICAL HISTORY:  HTN (hypertension)      HLD (hyperlipidemia)      Diabetes 1.5, managed as type 2      PVD (peripheral vascular disease)      Atrial fibrillation      CAD (coronary artery disease)      PAD (peripheral artery disease)      H/O varicose veins      Skin ulcer of left great toe      Skin ulcer of ankle      H/O cardiac catheterization      S/P peripheral artery angioplasty      Amputation of toe      History of prostate surgery          Vitals:  T(F): 97.5 (04-26), Max: 98 (04-25)  HR: 56 (04-26) (51 - 66)  BP: 121/69 (04-26) (104/50 - 137/62)  RR: 18 (04-26)  SpO2: 98% (04-26)  I&O's Summary    25 Apr 2024 07:01  -  26 Apr 2024 07:00  --------------------------------------------------------  IN: 360 mL / OUT: 0 mL / NET: 360 mL        Physical Exam:  Appearance: No acute distress; well appearing  Eyes: PERRL, EOMI, pink conjunctiva  HENT: Normal oral mucosa  Cardiovascular: RRR, S1, S2, no murmurs, rubs, or gallops; no edema; no JVD  Respiratory: Clear to auscultation bilaterally  Gastrointestinal: soft, non-tender, non-distended with normal bowel sounds  Musculoskeletal: No clubbing; no joint deformity   Neurologic: Non-focal  Lymphatic: No lymphadenopathy  Psychiatry: AAOx3, mood & affect appropriate  Skin: Bilateral lower extremity wounds consistent w/ history of PAD                           14.0   4.48  )-----------( 105      ( 25 Apr 2024 07:42 )             42.8     04-26    140  |  107  |  27<H>  ----------------------------<  120<H>  4.4   |  24  |  0.96    Ca    9.3      26 Apr 2024 03:15  Phos  3.5     04-26  Mg     2.0     04-26    TPro  7.5  /  Alb  4.5  /  TBili  0.5  /  DBili  x   /  AST  23  /  ALT  20  /  AlkPhos  53  04-25    PT/INR - ( 25 Apr 2024 07:42 )   PT: 12.0 sec;   INR: 1.09 ratio         PTT - ( 25 Apr 2024 07:42 )  PTT:36.0 sec                   Patient seen and examined at bedside.    Overnight Events: Overnight patient had episode of chest pain 7/10, states same in nature but more severe compared to prior chest pain. ECG done showing afib rate 54, no interventions. Patient states still experiencing L sided chest discomfort however mild in nature.    Telemetry: Afib with slow ventricular response rates range 30s-60s on tele     REVIEW OF SYSTEMS:  Constitutional:     [ x] negative [ ] fevers [ ] chills [ ] weight loss [ ] weight gain  HEENT:                  [ x] negative [ ] dry eyes [ ] eye irritation [ ] postnasal drip [ ] nasal congestion  CV:                         [ ] negative  [x ] chest pain [ ] orthopnea [ ] palpitations [ ] murmur  Resp:                     [ x] negative [ ] cough [ ] shortness of breath [ ] dyspnea [ ] wheezing [ ] sputum [ ]hemoptysis  GI:                          [ x] negative [ ] nausea [ ] vomiting [ ] diarrhea [ ] constipation [ ] abd pain [ ] dysphagia   :                        [ x] negative [ ] dysuria [ ] nocturia [ ] hematuria [ ] increased urinary frequency  Musculoskeletal: [ x] negative [ ] back pain [ ] myalgias [ ] arthralgias [ ] fracture  Skin:                       [ x] negative [ ] rash [ ] itch  Neurological:        [ x] negative [ ] headache [ ] dizziness [ ] syncope [ ] weakness [ ] numbness  Psychiatric:           [ x] negative [ ] anxiety [ ] depression  Endocrine:            [ x] negative [ ] diabetes [ ] thyroid problem  Heme/Lymph:      [ x] negative [ ] anemia [ ] bleeding problem  Allergic/Immune: [x ] negative [ ] itchy eyes [ ] nasal discharge [ ] hives [ ] angioedema    [x ] All other systems negative  [ ] Unable to assess ROS due to    Current Meds:  aspirin enteric coated 81 milliGRAM(s) Oral daily  atorvastatin 20 milliGRAM(s) Oral at bedtime  dextrose 10% Bolus 125 milliLiter(s) IV Bolus once  dextrose 5%. 1000 milliLiter(s) IV Continuous <Continuous>  dextrose 5%. 1000 milliLiter(s) IV Continuous <Continuous>  dextrose 50% Injectable 25 Gram(s) IV Push once  dextrose 50% Injectable 12.5 Gram(s) IV Push once  dextrose Oral Gel 15 Gram(s) Oral once PRN  finasteride 5 milliGRAM(s) Oral daily  glucagon  Injectable 1 milliGRAM(s) IntraMuscular once  influenza  Vaccine (HIGH DOSE) 0.7 milliLiter(s) IntraMuscular once  insulin lispro (ADMELOG) corrective regimen sliding scale   SubCutaneous three times a day before meals  lisinopril 5 milliGRAM(s) Oral daily      PAST MEDICAL & SURGICAL HISTORY:  HTN (hypertension)      HLD (hyperlipidemia)      Diabetes 1.5, managed as type 2      PVD (peripheral vascular disease)      Atrial fibrillation      CAD (coronary artery disease)      PAD (peripheral artery disease)      H/O varicose veins      Skin ulcer of left great toe      Skin ulcer of ankle      H/O cardiac catheterization      S/P peripheral artery angioplasty      Amputation of toe      History of prostate surgery          Vitals:  T(F): 97.5 (04-26), Max: 98 (04-25)  HR: 56 (04-26) (51 - 66)  BP: 121/69 (04-26) (104/50 - 137/62)  RR: 18 (04-26)  SpO2: 98% (04-26)  I&O's Summary    25 Apr 2024 07:01  -  26 Apr 2024 07:00  --------------------------------------------------------  IN: 360 mL / OUT: 0 mL / NET: 360 mL        Physical Exam:  Appearance: No acute distress; well appearing  Eyes: PERRL, EOMI, pink conjunctiva  HENT: Normal oral mucosa  Cardiovascular: RRR, S1, S2, no murmurs, rubs, or gallops; no edema; no JVD  Respiratory: Clear to auscultation bilaterally  Gastrointestinal: soft, non-tender, non-distended with normal bowel sounds  Musculoskeletal: No clubbing; no joint deformity   Neurologic: Non-focal  Lymphatic: No lymphadenopathy  Psychiatry: AAOx3, mood & affect appropriate  Skin: Bilateral lower extremity wounds consistent w/ history of PAD                           14.0   4.48  )-----------( 105      ( 25 Apr 2024 07:42 )             42.8     04-26    140  |  107  |  27<H>  ----------------------------<  120<H>  4.4   |  24  |  0.96    Ca    9.3      26 Apr 2024 03:15  Phos  3.5     04-26  Mg     2.0     04-26    TPro  7.5  /  Alb  4.5  /  TBili  0.5  /  DBili  x   /  AST  23  /  ALT  20  /  AlkPhos  53  04-25    PT/INR - ( 25 Apr 2024 07:42 )   PT: 12.0 sec;   INR: 1.09 ratio         PTT - ( 25 Apr 2024 07:42 )  PTT:36.0 sec

## 2024-04-26 NOTE — PROGRESS NOTE ADULT - ATTENDING COMMENTS
Patient with known coronary artery disease presents with chest pain.  Trop negative x2  Plan for LHC today to evaluate for disease progression    ADDENDUM:  LHC with new severe LAD disease, now status post PCI    Medical management including DAPT and high intensity statin therapy  Discharge planning with outpatient follow-up with Luigi Cabrera MD.

## 2024-04-26 NOTE — PROVIDER CONTACT NOTE (OTHER) - RECOMMENDATIONS
Provider notified. pt assessed by provider. vitals stable. Provider notified. provider came to assess. vitals stable. pt has no complaints.

## 2024-04-26 NOTE — PROGRESS NOTE ADULT - ASSESSMENT
73-year-old male with past medical history of A-fib, HLD, HTN, CAD with 2 stents presenting due to chest pain for 2 days that worsens 2 hours ago.  Patient describes chest pain as crushing, in the center chest, and associate with some shortness of breath.  Patient states nothing makes the pain better or worse, and he does not notice increased symptoms with exertion.  Patient denies any fevers, chills, body aches, nausea, vomiting.    Chest Pain  -Hold home metoprolol due to bradycardia   - stress test reviewed   -Monitor on tele  - cath today     Afib  - cw xarelto     CAD, PCI  - cards fu   - cw asa, statin  - cath today    case dw pt and cards

## 2024-04-27 LAB
ANION GAP SERPL CALC-SCNC: 12 MMOL/L — SIGNIFICANT CHANGE UP (ref 5–17)
APTT BLD: 32.7 SEC — SIGNIFICANT CHANGE UP (ref 24.5–35.6)
BUN SERPL-MCNC: 21 MG/DL — SIGNIFICANT CHANGE UP (ref 7–23)
CALCIUM SERPL-MCNC: 9.1 MG/DL — SIGNIFICANT CHANGE UP (ref 8.4–10.5)
CHLORIDE SERPL-SCNC: 106 MMOL/L — SIGNIFICANT CHANGE UP (ref 96–108)
CO2 SERPL-SCNC: 21 MMOL/L — LOW (ref 22–31)
CREAT SERPL-MCNC: 0.76 MG/DL — SIGNIFICANT CHANGE UP (ref 0.5–1.3)
EGFR: 96 ML/MIN/1.73M2 — SIGNIFICANT CHANGE UP
GLUCOSE BLDC GLUCOMTR-MCNC: 102 MG/DL — HIGH (ref 70–99)
GLUCOSE BLDC GLUCOMTR-MCNC: 123 MG/DL — HIGH (ref 70–99)
GLUCOSE BLDC GLUCOMTR-MCNC: 124 MG/DL — HIGH (ref 70–99)
GLUCOSE BLDC GLUCOMTR-MCNC: 99 MG/DL — SIGNIFICANT CHANGE UP (ref 70–99)
GLUCOSE SERPL-MCNC: 169 MG/DL — HIGH (ref 70–99)
HCT VFR BLD CALC: 44.4 % — SIGNIFICANT CHANGE UP (ref 39–50)
HGB BLD-MCNC: 14.3 G/DL — SIGNIFICANT CHANGE UP (ref 13–17)
INR BLD: 1.21 RATIO — HIGH (ref 0.85–1.18)
MAGNESIUM SERPL-MCNC: 1.9 MG/DL — SIGNIFICANT CHANGE UP (ref 1.6–2.6)
MCHC RBC-ENTMCNC: 30.7 PG — SIGNIFICANT CHANGE UP (ref 27–34)
MCHC RBC-ENTMCNC: 32.2 GM/DL — SIGNIFICANT CHANGE UP (ref 32–36)
MCV RBC AUTO: 95.3 FL — SIGNIFICANT CHANGE UP (ref 80–100)
NRBC # BLD: 0 /100 WBCS — SIGNIFICANT CHANGE UP (ref 0–0)
PHOSPHATE SERPL-MCNC: 2.5 MG/DL — SIGNIFICANT CHANGE UP (ref 2.5–4.5)
PLATELET # BLD AUTO: 92 K/UL — LOW (ref 150–400)
POTASSIUM SERPL-MCNC: 4.4 MMOL/L — SIGNIFICANT CHANGE UP (ref 3.5–5.3)
POTASSIUM SERPL-SCNC: 4.4 MMOL/L — SIGNIFICANT CHANGE UP (ref 3.5–5.3)
PROTHROM AB SERPL-ACNC: 12.6 SEC — SIGNIFICANT CHANGE UP (ref 9.5–13)
RBC # BLD: 4.66 M/UL — SIGNIFICANT CHANGE UP (ref 4.2–5.8)
RBC # FLD: 14.7 % — HIGH (ref 10.3–14.5)
SODIUM SERPL-SCNC: 139 MMOL/L — SIGNIFICANT CHANGE UP (ref 135–145)
WBC # BLD: 7.24 K/UL — SIGNIFICANT CHANGE UP (ref 3.8–10.5)
WBC # FLD AUTO: 7.24 K/UL — SIGNIFICANT CHANGE UP (ref 3.8–10.5)

## 2024-04-27 PROCEDURE — 93010 ELECTROCARDIOGRAM REPORT: CPT

## 2024-04-27 PROCEDURE — 99232 SBSQ HOSP IP/OBS MODERATE 35: CPT

## 2024-04-27 RX ORDER — PANTOPRAZOLE SODIUM 20 MG/1
40 TABLET, DELAYED RELEASE ORAL
Refills: 0 | Status: DISCONTINUED | OUTPATIENT
Start: 2024-04-27 | End: 2024-04-28

## 2024-04-27 RX ORDER — RIVAROXABAN 15 MG-20MG
20 KIT ORAL
Refills: 0 | Status: DISCONTINUED | OUTPATIENT
Start: 2024-04-27 | End: 2024-04-28

## 2024-04-27 RX ORDER — ACETAMINOPHEN 500 MG
650 TABLET ORAL ONCE
Refills: 0 | Status: COMPLETED | OUTPATIENT
Start: 2024-04-27 | End: 2024-04-27

## 2024-04-27 RX ORDER — METOPROLOL TARTRATE 50 MG
25 TABLET ORAL DAILY
Refills: 0 | Status: DISCONTINUED | OUTPATIENT
Start: 2024-04-27 | End: 2024-04-28

## 2024-04-27 RX ADMIN — Medication 650 MILLIGRAM(S): at 10:09

## 2024-04-27 RX ADMIN — LISINOPRIL 5 MILLIGRAM(S): 2.5 TABLET ORAL at 04:55

## 2024-04-27 RX ADMIN — ATORVASTATIN CALCIUM 20 MILLIGRAM(S): 80 TABLET, FILM COATED ORAL at 21:09

## 2024-04-27 RX ADMIN — Medication 30 MILLILITER(S): at 10:09

## 2024-04-27 RX ADMIN — Medication 30 MILLILITER(S): at 06:34

## 2024-04-27 RX ADMIN — PANTOPRAZOLE SODIUM 40 MILLIGRAM(S): 20 TABLET, DELAYED RELEASE ORAL at 10:09

## 2024-04-27 RX ADMIN — CLOPIDOGREL BISULFATE 75 MILLIGRAM(S): 75 TABLET, FILM COATED ORAL at 11:25

## 2024-04-27 RX ADMIN — Medication 25 MILLIGRAM(S): at 12:57

## 2024-04-27 RX ADMIN — FINASTERIDE 5 MILLIGRAM(S): 5 TABLET, FILM COATED ORAL at 11:25

## 2024-04-27 RX ADMIN — Medication 81 MILLIGRAM(S): at 11:25

## 2024-04-27 RX ADMIN — Medication 650 MILLIGRAM(S): at 11:02

## 2024-04-27 RX ADMIN — RIVAROXABAN 20 MILLIGRAM(S): KIT at 17:05

## 2024-04-27 NOTE — PROGRESS NOTE ADULT - ASSESSMENT
73-year-old male with past medical history of A-fib, HLD, HTN, CAD with 2 stents presenting due to chest pain for 2 days that worsens 2 hours ago.  Patient describes chest pain as crushing, in the center chest, and associate with some shortness of breath.  Patient states nothing makes the pain better or worse, and he does not notice increased symptoms with exertion.  Patient denies any fevers, chills, body aches, nausea, vomiting.    Chest Pain  -Hold home metoprolol due to bradycardia   - stress test reviewed   -Monitor on tele  - sp cath and PCI     Afib  - cw xarelto     CAD, PCI  - cards fu   - cw asa, statin  - cath today    case dw pt and cards

## 2024-04-27 NOTE — PROGRESS NOTE ADULT - SUBJECTIVE AND OBJECTIVE BOX
Patient is a 73y old  Male who presents with a chief complaint of chest pain (27 Apr 2024 06:20)    Date of servie : 04-27-24 @ 13:38  INTERVAL HPI/OVERNIGHT EVENTS:  T(C): 36.5 (04-27-24 @ 04:35), Max: 36.8 (04-26-24 @ 20:39)  HR: 64 (04-27-24 @ 12:55) (54 - 93)  BP: 137/71 (04-27-24 @ 12:55) (106/53 - 138/63)  RR: 18 (04-27-24 @ 04:35) (16 - 18)  SpO2: 95% (04-27-24 @ 04:35) (93% - 100%)  Wt(kg): --  I&O's Summary    26 Apr 2024 07:01  -  27 Apr 2024 07:00  --------------------------------------------------------  IN: 240 mL / OUT: 0 mL / NET: 240 mL    27 Apr 2024 07:01  -  27 Apr 2024 13:38  --------------------------------------------------------  IN: 600 mL / OUT: 0 mL / NET: 600 mL        LABS:                        14.3   7.24  )-----------( 92       ( 27 Apr 2024 05:48 )             44.4     04-27    139  |  106  |  21  ----------------------------<  169<H>  4.4   |  21<L>  |  0.76    Ca    9.1      27 Apr 2024 05:48  Phos  2.5     04-27  Mg     1.9     04-27      PT/INR - ( 27 Apr 2024 05:48 )   PT: 12.6 sec;   INR: 1.21 ratio         PTT - ( 27 Apr 2024 05:48 )  PTT:32.7 sec  Urinalysis Basic - ( 27 Apr 2024 05:48 )    Color: x / Appearance: x / SG: x / pH: x  Gluc: 169 mg/dL / Ketone: x  / Bili: x / Urobili: x   Blood: x / Protein: x / Nitrite: x   Leuk Esterase: x / RBC: x / WBC x   Sq Epi: x / Non Sq Epi: x / Bacteria: x      CAPILLARY BLOOD GLUCOSE      POCT Blood Glucose.: 99 mg/dL (27 Apr 2024 12:02)  POCT Blood Glucose.: 124 mg/dL (27 Apr 2024 08:03)  POCT Blood Glucose.: 152 mg/dL (26 Apr 2024 21:50)  POCT Blood Glucose.: 103 mg/dL (26 Apr 2024 16:40)        Urinalysis Basic - ( 27 Apr 2024 05:48 )    Color: x / Appearance: x / SG: x / pH: x  Gluc: 169 mg/dL / Ketone: x  / Bili: x / Urobili: x   Blood: x / Protein: x / Nitrite: x   Leuk Esterase: x / RBC: x / WBC x   Sq Epi: x / Non Sq Epi: x / Bacteria: x        MEDICATIONS  (STANDING):  aspirin enteric coated 81 milliGRAM(s) Oral daily  atorvastatin 20 milliGRAM(s) Oral at bedtime  clopidogrel Tablet 75 milliGRAM(s) Oral daily  dextrose 10% Bolus 125 milliLiter(s) IV Bolus once  dextrose 5%. 1000 milliLiter(s) (50 mL/Hr) IV Continuous <Continuous>  dextrose 5%. 1000 milliLiter(s) (100 mL/Hr) IV Continuous <Continuous>  dextrose 50% Injectable 12.5 Gram(s) IV Push once  dextrose 50% Injectable 25 Gram(s) IV Push once  finasteride 5 milliGRAM(s) Oral daily  glucagon  Injectable 1 milliGRAM(s) IntraMuscular once  influenza  Vaccine (HIGH DOSE) 0.7 milliLiter(s) IntraMuscular once  insulin lispro (ADMELOG) corrective regimen sliding scale   SubCutaneous three times a day before meals  lisinopril 5 milliGRAM(s) Oral daily  metoprolol succinate ER 25 milliGRAM(s) Oral daily  pantoprazole    Tablet 40 milliGRAM(s) Oral before breakfast  rivaroxaban 20 milliGRAM(s) Oral with dinner  sodium chloride 0.9%. 1000 milliLiter(s) (75 mL/Hr) IV Continuous <Continuous>  sodium chloride 0.9%. 1000 milliLiter(s) (75 mL/Hr) IV Continuous <Continuous>    MEDICATIONS  (PRN):  dextrose Oral Gel 15 Gram(s) Oral once PRN Blood Glucose LESS THAN 70 milliGRAM(s)/deciliter          PHYSICAL EXAM:  GENERAL: NAD, well-groomed, well-developed  HEAD:  Atraumatic, Normocephalic  CHEST/LUNG: Clear to percussion bilaterally; No rales, rhonchi, wheezing, or rubs  HEART: Regular rate and rhythm; No murmurs, rubs, or gallops  ABDOMEN: Soft, Nontender, Nondistended; Bowel sounds present  EXTREMITIES:  2+ Peripheral Pulses, No clubbing, cyanosis, or edema  LYMPH: No lymphadenopathy noted  SKIN: No rashes or lesions    Care Discussed with Consultants/Other Providers [ ] YES  [ ] NO

## 2024-04-27 NOTE — CHART NOTE - NSCHARTNOTEFT_GEN_A_CORE
Chart/Event Note  [facility] [id number]  QUINCY MELLO, 73y, Male  [id number]  Notified by RN that patient having mild chest discomfort. Patient was seen and assessed at bedside. pt complaints of mild chest discomfort for the last 2 hours, states the pain is midline and doesn't radiates. patient states it might be gas but unsure. Patient denies any SOB, HA, Dizziness, N/V.   VS stable.     Review of Systems:  Constitutional: No fever, chills, or fatigue.  Neurologic: No headache, dizziness, vision/speech changes, numbness, or weakness.  Respiratory: No cough, wheezing, dyspnea, or shortness of breath.  Cardiovascular: Complains of cChest pain. No pressure or palpitations.   GI: No abdominal pain, nausea, vomiting, diarrhea, constipation.   : No dysuria, burning, frequency, incontinence, or retention.   Skin: No itching, burning, rashes, or lesions .  Musculoskeletal: No joint pain or swelling.   Psychiatric: No depression, anxiety, or mood swings.    T(C): 36.5 (04-27-24 @ 04:35), Max: 36.9 (04-26-24 @ 10:30)  HR: 65 (04-27-24 @ 04:35) (54 - 68)  BP: 111/67 (04-27-24 @ 04:35) (106/53 - 158/63)  RR: 18 (04-27-24 @ 04:35) (16 - 18)  SpO2: 95% (04-27-24 @ 04:35) (93% - 100%)                        14.0   4.48  )-----------( 105      ( 25 Apr 2024 07:42 )             42.8          Physical Examination:  GENERAL: No acute distress noted during examination. Patient is well-groomed and developed.  NERVOUS SYSTEM:  Alert & oriented X3 with appropriate concentration. Motor strength is 5/5 in both bilateral upper and lower extremities. No focal or lateralizing neurologic deficits noted.   HEAD:  Atraumatic and normocephalic.  EYES: EOMI/PERRLA. Conjunctiva and sclera clear  ENMT: No tonsillar erythema, exudates, lesions, or enlargement. Moist mucous membranes with good dentition.   NECK: Supple with no JVD.   CHEST: Clear to ascultation bilaterally. No rales, rhonchi, wheezing, or rubs heard. Symmetrical/bilateral chest wall rise.   HEART: Regular rate and rhythm with no murmurs, rubs, or gallops.  ABDOMEN: Soft, nontender, and nondistended.   EXTREMITIES:  2+ Peripheral Pulses without clubbing, cyanosis, or edema.  LYMPH: No lymphadenopathy noted.  SKIN: No rashes or lesions seen.         Plan:   #Chest Pain   - EKG obtained and reviewed, compared to previous EKG. No acute changes noted.   - Continue cardiac monitoring., if complaints of further chest pain will endorse team to order trops   - Will endorse the above diagnostics and findings to the day medicine team for review and follow up. Will additionally sign out to day medicine teams to follow with Cardiology and other relevant specialties.   - Cards following    Attending to follow in AM    Мария Quinones PA-C  Department of Medicine   Spectralink

## 2024-04-27 NOTE — CHART NOTE - NSCHARTNOTESELECT_GEN_ALL_CORE
Bradycardia/Event Note
Event Note
Transition Communication Hand-off for Care Transitions to Next Level of Care Provider    Name: Steffany Brown  MRN #: [id number]  Primary Care Provider: Leo Hancock     Primary Clinic: [facility] [address] [facility] [address]     Reason for Hospitalization:  RLQ abdominal pain [R10.31]  Admit Date/Time: 1/11/2017  4:37 PM  Discharge Date: 1/12/2017  Payor Source: Payor: MEDICARE / Plan: MEDICARE / Product Type: Medicare /         Plan of Care Goals/Milestone Events:   Patient Concerns: ankle pain          Reason for Communication Hand-off Referral: Fragility    Discharge Plan:       Concern for non-adherence with plan of care:   No  Discharge Needs Assessment:  Needs       Most Recent Value    Equipment Currently Used at Home walker, rolling    # of Referrals Placed by CTS Scheduled Follow-up appointments, Communication hand-offs to next level of Care Providers    Referrals Placed Home Care, Transportation          Already enrolled in Tele-monitoring program and name of program:  n/a  Follow-up specialty is recommended: No    Follow-up plan:  Future Appointments  Date Time Provider Department Center   1/18/2017 9:20 AM Leo Hancock MD [facility]       Any outstanding tests or procedures:      None        Key Recommendations:      Kay Anthony    AVS/Discharge Summary is the source of truth; this is a helpful guide for improved communication of patient story            
Chest Pain/Event Note

## 2024-04-27 NOTE — PROGRESS NOTE ADULT - SUBJECTIVE AND OBJECTIVE BOX
Interventional Cardiology Post Cath Progress Note:                Subjective:   Denies shortness of breath. Denies pain, numbness, tingling or swelling around (wrist) access site. Pt. endorses mild reflux, denies current chest pain. Pt. stated he had episode of reflux vs chest pressure overnight    Tele 24hrs: afib      MEDICATIONS  (STANDING):  aspirin enteric coated 81 milliGRAM(s) Oral daily  atorvastatin 20 milliGRAM(s) Oral at bedtime  clopidogrel Tablet 75 milliGRAM(s) Oral daily  dextrose 10% Bolus 125 milliLiter(s) IV Bolus once  dextrose 5%. 1000 milliLiter(s) (50 mL/Hr) IV Continuous <Continuous>  dextrose 5%. 1000 milliLiter(s) (100 mL/Hr) IV Continuous <Continuous>  dextrose 50% Injectable 25 Gram(s) IV Push once  dextrose 50% Injectable 12.5 Gram(s) IV Push once  finasteride 5 milliGRAM(s) Oral daily  glucagon  Injectable 1 milliGRAM(s) IntraMuscular once  influenza  Vaccine (HIGH DOSE) 0.7 milliLiter(s) IntraMuscular once  insulin lispro (ADMELOG) corrective regimen sliding scale   SubCutaneous three times a day before meals  lisinopril 5 milliGRAM(s) Oral daily  sodium chloride 0.9%. 1000 milliLiter(s) (75 mL/Hr) IV Continuous <Continuous>  sodium chloride 0.9%. 1000 milliLiter(s) (75 mL/Hr) IV Continuous <Continuous>    MEDICATIONS  (PRN):  dextrose Oral Gel 15 Gram(s) Oral once PRN Blood Glucose LESS THAN 70 milliGRAM(s)/deciliter      Objective:  Vital Signs Last 24 Hrs  T(C): 36.5 (27 Apr 2024 04:35), Max: 36.9 (26 Apr 2024 10:30)  T(F): 97.7 (27 Apr 2024 04:35), Max: 98.4 (26 Apr 2024 10:30)  HR: 65 (27 Apr 2024 04:35) (54 - 68)  BP: 111/67 (27 Apr 2024 04:35) (106/53 - 158/63)  BP(mean): 81 (26 Apr 2024 19:50) (76 - 102)  RR: 18 (27 Apr 2024 04:35) (16 - 18)  SpO2: 95% (27 Apr 2024 04:35) (93% - 100%)    Parameters below as of 27 Apr 2024 04:35  Patient On (Oxygen Delivery Method): room air        04-26-24 @ 07:01  -  04-27-24 @ 07:00  --------------------------------------------------------  IN: 240 mL / OUT: 0 mL / NET: 240 mL                              14.3   7.24  )-----------( 92       ( 27 Apr 2024 05:48 )             44.4     04-27    139  |  106  |  21  ----------------------------<  169<H>  4.4   |  21<L>  |  0.76    Ca    9.1      27 Apr 2024 05:48  Phos  2.5     04-27  Mg     1.9     04-27      PT/INR - ( 27 Apr 2024 05:48 )   PT: 12.6 sec;   INR: 1.21 ratio         PTT - ( 27 Apr 2024 05:48 )  PTT:32.7 sec  Urinalysis Basic - ( 27 Apr 2024 05:48 )    Color: x / Appearance: x / SG: x / pH: x  Gluc: 169 mg/dL / Ketone: x  / Bili: x / Urobili: x   Blood: x / Protein: x / Nitrite: x   Leuk Esterase: x / RBC: x / WBC x   Sq Epi: x / Non Sq Epi: x / Bacteria: x      Physical Exam:  No apparent distress, alert and oriented times three, appropriate affect  JVD is not elevated, supple  Clear to auscultation with no wheezing, rhonchi or crackles  Regular rate and rhythm with no murmur, rub or gallop  Positive bowel sounds, soft, non-tender, non-distended, no masses/guarding or rebound tenderness  Right Upper Extremity: Soft, non tender, no bleeding or hematoma, clean/dry/intact- RUE +2 palpable radial pulse, -bruit. Small ecchymotic area

## 2024-04-27 NOTE — PROGRESS NOTE ADULT - ASSESSMENT
Assessment/Plan:   HPI:  73-year-old male with past medical history of A-fib, HLD, HTN, CAD with 2 stents presenting due to chest pain for 2 days that worsens 2 hours ago.  Patient describes chest pain as crushing, in the center chest, and associate with some shortness of breath.  Patient states nothing makes the pain better or worse, and he does not notice increased symptoms with exertion.  Patient denies any fevers, chills, body aches, nausea, vomiting. (25 Apr 2024 17:15)      # CAD  - s/p FELA mLAD via RRA 4/26/24 without complication.   - Radial  site stable-small ecchymosis.  No hematoma  - Continue DAPT (aspirin 81mg for 1 week and clopidogrel 75mg)  - Resume Xarelto.  Monitor platelets.  Monitor for bleeding  #HLD  - Continue statin    #HTN  - Pt. on Lisinopril-c/o consider switching to ARDB    - Recommend a heart healthy diet which includes a variety of fruits and vegetables, whole grains, low fat dairy products, legumes and skinless poultry and fish; food prepared with little or no salt and minimize processed foods  - Avoid using NSAIDs  (Aleve, Motrin, ibuprofen, naproxen) while on DAPT, please utilize Tylenol for pain control (not to exceed 4gm in 24 hours)  - Patient aware to take DAPT  as prescribed and DO NOT STOP taking without consulting cardiologist first or STENT/s WILL CLOSE  - Reviewed and reinforced with patient:  site complications ( eg: bleeding, excruciating pain at the procedural site, large swelling ball size-  extremity numbness, tingling, temperature change), or CHEST PAIN; pt aware that if any of those occur he/she must call cardiologist IMMEDIATELY or 911 or go to nearest emergency room   - Reviewed and reinforced a heart healthy diet, Smoking Cessation  - Patient verbalizes understanding of ALL OF THE ABOVE, and gives positive feedback   -please make sure DAPT is prescribed to pt's preferred pharmacy on dc   -f/u appt in 2 weeks post dc with outpt cardiologist  - Keep Mg >2 K>4   - cont to monitor on tele  - all other care as per primary     Yumi Khan [facility]-BC  Invasive Cardiology    Please check [email or web address] password [email or web address] for cardiology service schedule and contact information via TEAMS. Assessment/Plan:   HPI:  73-year-old male with past medical history of A-fib, HLD, HTN, CAD with 2 stents presenting due to chest pain for 2 days that worsens 2 hours ago.  Patient describes chest pain as crushing, in the center chest, and associate with some shortness of breath.  Patient states nothing makes the pain better or worse, and he does not notice increased symptoms with exertion.  Patient denies any fevers, chills, body aches, nausea, vomiting. (25 Apr 2024 17:15)      # CAD  - s/p FELA mLAD via RRA 4/26/24 without complication.   - Pt. endorses mild reflux symptoms.  VSS No EKG changes. Continue to monitor.  - Radial  site stable-small ecchymosis.  No hematoma  - Continue DAPT (aspirin 81mg for 1 week and clopidogrel 75mg)  - Resume Xarelto.  Monitor platelets.  Monitor for bleeding  #HLD  - Continue statin    #HTN  - Pt. on Lisinopril-c/o consider switching to ARDB    - Recommend a heart healthy diet which includes a variety of fruits and vegetables, whole grains, low fat dairy products, legumes and skinless poultry and fish; food prepared with little or no salt and minimize processed foods  - Avoid using NSAIDs  (Aleve, Motrin, ibuprofen, naproxen) while on DAPT, please utilize Tylenol for pain control (not to exceed 4gm in 24 hours)  - Patient aware to take DAPT  as prescribed and DO NOT STOP taking without consulting cardiologist first or STENT/s WILL CLOSE  - Reviewed and reinforced with patient:  site complications ( eg: bleeding, excruciating pain at the procedural site, large swelling ball size-  extremity numbness, tingling, temperature change), or CHEST PAIN; pt aware that if any of those occur he/she must call cardiologist IMMEDIATELY or 911 or go to nearest emergency room   - Reviewed and reinforced a heart healthy diet, Smoking Cessation  - Patient verbalizes understanding of ALL OF THE ABOVE, and gives positive feedback   -please make sure DAPT is prescribed to pt's preferred pharmacy on dc   -f/u appt in 2 weeks post dc with outpt cardiologist  - Keep Mg >2 K>4   - cont to monitor on tele  - all other care as per primary     Yumi TESFAYE-BC  Invasive Cardiology    Please check [email or web address] password [email or web address] for cardiology service schedule and contact information via TEAMS.

## 2024-04-28 ENCOUNTER — TRANSCRIPTION ENCOUNTER (OUTPATIENT)
Age: 74
End: 2024-04-28

## 2024-04-28 VITALS — SYSTOLIC BLOOD PRESSURE: 117 MMHG | HEART RATE: 61 BPM | DIASTOLIC BLOOD PRESSURE: 70 MMHG

## 2024-04-28 LAB
ANION GAP SERPL CALC-SCNC: 13 MMOL/L — SIGNIFICANT CHANGE UP (ref 5–17)
BUN SERPL-MCNC: 19 MG/DL — SIGNIFICANT CHANGE UP (ref 7–23)
CALCIUM SERPL-MCNC: 9.4 MG/DL — SIGNIFICANT CHANGE UP (ref 8.4–10.5)
CHLORIDE SERPL-SCNC: 104 MMOL/L — SIGNIFICANT CHANGE UP (ref 96–108)
CO2 SERPL-SCNC: 23 MMOL/L — SIGNIFICANT CHANGE UP (ref 22–31)
CREAT SERPL-MCNC: 0.84 MG/DL — SIGNIFICANT CHANGE UP (ref 0.5–1.3)
EGFR: 93 ML/MIN/1.73M2 — SIGNIFICANT CHANGE UP
GLUCOSE BLDC GLUCOMTR-MCNC: 120 MG/DL — HIGH (ref 70–99)
GLUCOSE BLDC GLUCOMTR-MCNC: 174 MG/DL — HIGH (ref 70–99)
GLUCOSE SERPL-MCNC: 169 MG/DL — HIGH (ref 70–99)
HCT VFR BLD CALC: 46 % — SIGNIFICANT CHANGE UP (ref 39–50)
HGB BLD-MCNC: 14.7 G/DL — SIGNIFICANT CHANGE UP (ref 13–17)
MAGNESIUM SERPL-MCNC: 2.2 MG/DL — SIGNIFICANT CHANGE UP (ref 1.6–2.6)
MCHC RBC-ENTMCNC: 30.3 PG — SIGNIFICANT CHANGE UP (ref 27–34)
MCHC RBC-ENTMCNC: 32 GM/DL — SIGNIFICANT CHANGE UP (ref 32–36)
MCV RBC AUTO: 94.8 FL — SIGNIFICANT CHANGE UP (ref 80–100)
NRBC # BLD: 0 /100 WBCS — SIGNIFICANT CHANGE UP (ref 0–0)
PHOSPHATE SERPL-MCNC: 2.3 MG/DL — LOW (ref 2.5–4.5)
PLATELET # BLD AUTO: 105 K/UL — LOW (ref 150–400)
POTASSIUM SERPL-MCNC: 4.2 MMOL/L — SIGNIFICANT CHANGE UP (ref 3.5–5.3)
POTASSIUM SERPL-SCNC: 4.2 MMOL/L — SIGNIFICANT CHANGE UP (ref 3.5–5.3)
RBC # BLD: 4.85 M/UL — SIGNIFICANT CHANGE UP (ref 4.2–5.8)
RBC # FLD: 14.6 % — HIGH (ref 10.3–14.5)
SODIUM SERPL-SCNC: 140 MMOL/L — SIGNIFICANT CHANGE UP (ref 135–145)
WBC # BLD: 7.4 K/UL — SIGNIFICANT CHANGE UP (ref 3.8–10.5)
WBC # FLD AUTO: 7.4 K/UL — SIGNIFICANT CHANGE UP (ref 3.8–10.5)

## 2024-04-28 PROCEDURE — C1874: CPT

## 2024-04-28 PROCEDURE — 85027 COMPLETE CBC AUTOMATED: CPT

## 2024-04-28 PROCEDURE — 93321 DOPPLER ECHO F-UP/LMTD STD: CPT

## 2024-04-28 PROCEDURE — C9600: CPT | Mod: LD

## 2024-04-28 PROCEDURE — 82330 ASSAY OF CALCIUM: CPT

## 2024-04-28 PROCEDURE — 84484 ASSAY OF TROPONIN QUANT: CPT

## 2024-04-28 PROCEDURE — 80053 COMPREHEN METABOLIC PANEL: CPT

## 2024-04-28 PROCEDURE — 93005 ELECTROCARDIOGRAM TRACING: CPT

## 2024-04-28 PROCEDURE — 85025 COMPLETE CBC W/AUTO DIFF WBC: CPT

## 2024-04-28 PROCEDURE — 80048 BASIC METABOLIC PNL TOTAL CA: CPT

## 2024-04-28 PROCEDURE — 82962 GLUCOSE BLOOD TEST: CPT

## 2024-04-28 PROCEDURE — C1894: CPT

## 2024-04-28 PROCEDURE — 83735 ASSAY OF MAGNESIUM: CPT

## 2024-04-28 PROCEDURE — 85730 THROMBOPLASTIN TIME PARTIAL: CPT

## 2024-04-28 PROCEDURE — 78452 HT MUSCLE IMAGE SPECT MULT: CPT | Mod: MC

## 2024-04-28 PROCEDURE — 85014 HEMATOCRIT: CPT

## 2024-04-28 PROCEDURE — 85610 PROTHROMBIN TIME: CPT

## 2024-04-28 PROCEDURE — 83605 ASSAY OF LACTIC ACID: CPT

## 2024-04-28 PROCEDURE — 84132 ASSAY OF SERUM POTASSIUM: CPT

## 2024-04-28 PROCEDURE — 93971 EXTREMITY STUDY: CPT

## 2024-04-28 PROCEDURE — 71045 X-RAY EXAM CHEST 1 VIEW: CPT

## 2024-04-28 PROCEDURE — 99285 EMERGENCY DEPT VISIT HI MDM: CPT

## 2024-04-28 PROCEDURE — 84100 ASSAY OF PHOSPHORUS: CPT

## 2024-04-28 PROCEDURE — 84295 ASSAY OF SERUM SODIUM: CPT

## 2024-04-28 PROCEDURE — 82803 BLOOD GASES ANY COMBINATION: CPT

## 2024-04-28 PROCEDURE — 93017 CV STRESS TEST TRACING ONLY: CPT

## 2024-04-28 PROCEDURE — 93458 L HRT ARTERY/VENTRICLE ANGIO: CPT | Mod: 59

## 2024-04-28 PROCEDURE — 78451 HT MUSCLE IMAGE SPECT SING: CPT | Mod: MC

## 2024-04-28 PROCEDURE — 93799 UNLISTED CV SVC/PROCEDURE: CPT

## 2024-04-28 PROCEDURE — 85018 HEMOGLOBIN: CPT

## 2024-04-28 PROCEDURE — 83036 HEMOGLOBIN GLYCOSYLATED A1C: CPT

## 2024-04-28 PROCEDURE — 83880 ASSAY OF NATRIURETIC PEPTIDE: CPT

## 2024-04-28 PROCEDURE — 82947 ASSAY GLUCOSE BLOOD QUANT: CPT

## 2024-04-28 PROCEDURE — C1887: CPT

## 2024-04-28 PROCEDURE — C8924: CPT

## 2024-04-28 PROCEDURE — A9500: CPT

## 2024-04-28 PROCEDURE — 82435 ASSAY OF BLOOD CHLORIDE: CPT

## 2024-04-28 PROCEDURE — C1769: CPT

## 2024-04-28 PROCEDURE — 36415 COLL VENOUS BLD VENIPUNCTURE: CPT

## 2024-04-28 RX ORDER — METOPROLOL TARTRATE 50 MG
1 TABLET ORAL
Qty: 0 | Refills: 0 | DISCHARGE

## 2024-04-28 RX ORDER — CLOPIDOGREL BISULFATE 75 MG/1
1 TABLET, FILM COATED ORAL
Qty: 30 | Refills: 0
Start: 2024-04-28 | End: 2024-05-27

## 2024-04-28 RX ADMIN — PANTOPRAZOLE SODIUM 40 MILLIGRAM(S): 20 TABLET, DELAYED RELEASE ORAL at 05:48

## 2024-04-28 RX ADMIN — Medication 81 MILLIGRAM(S): at 12:18

## 2024-04-28 RX ADMIN — LISINOPRIL 5 MILLIGRAM(S): 2.5 TABLET ORAL at 08:13

## 2024-04-28 RX ADMIN — Medication 1: at 08:08

## 2024-04-28 RX ADMIN — FINASTERIDE 5 MILLIGRAM(S): 5 TABLET, FILM COATED ORAL at 12:18

## 2024-04-28 RX ADMIN — CLOPIDOGREL BISULFATE 75 MILLIGRAM(S): 75 TABLET, FILM COATED ORAL at 12:18

## 2024-04-28 NOTE — DISCHARGE NOTE PROVIDER - NSDCFUSCHEDAPPT_GEN_ALL_CORE_FT
Silvino Leone  [facility]  OP[facility]ALM [address]  Scheduled Appointment: 07/18/2024

## 2024-04-28 NOTE — DISCHARGE NOTE PROVIDER - CARE PROVIDERS DIRECT ADDRESSES
Adult Nutrition  Assessment/PES    Patient Name:  Milena Ibarra  YOB: 1992  MRN: [id number]  Admit Date:  12/20/2022    Assessment Date:  12/21/2022    Comments:  Pt admitted with depression.  Hx of depression, anxiety.  RDN staff seeing for MST.  Pt reports decreased appetite and not eating as much as usual.  Has been having nausea a lot for the last couple of weeks.  Sometimes zofran helps.  States she thinks it might be new medication she started 1 month ago.  Denies chewing/swallowing difficulty, food allergies.  Has lost a few lbs but nothing significant.  Asked pt if she would like a supplement at this time, and she said she would like to see how meals go first.  RDN staff will follow hospital course.     Reason for Assessment     Row Name 12/21/22 1428          Reason for Assessment    Reason For Assessment identified at risk by screening criteria     Identified At Risk by Screening Criteria MST SCORE 2+                Nutrition/Diet History     Row Name 12/21/22 1423          Nutrition/Diet History    Typical Intake (Food/Fluid/EN/PN) pt reports appetite hasn't been great and eating less than normal.     Factors Affecting Nutritional Intake appetite;nausea                Labs/Tests/Procedures/Meds     Row Name 12/21/22 1427          Labs/Procedures/Meds    Lab Results Reviewed reviewed     Lab Results Comments Cl 109, BUN 3        Diagnostic Tests/Procedures    Diagnostic Test/Procedure Reviewed reviewed        Medications    Pertinent Medications Reviewed reviewed                  Estimated/Assessed Needs - Anthropometrics     Row Name 12/21/22 142          Anthropometrics    Weight for Calculation 70.8 kg (156 lb 1.4 oz)        Estimated/Assessed Needs    Additional Documentation KCAL/KG (Group);Protein Requirements (Group);Fluid Requirements (Group)        KCAL/KG    KCAL/KG 25 Kcal/Kg (kcal)     25 Kcal/Kg (kcal) 1770        Protein Requirements    Weight Used For Protein  Calculations 70.8 kg (156 lb 1.4 oz)     Est Protein Requirement Amount (gms/kg) 1.0 gm protein     Estimated Protein Requirements (gms/day) 70.8        Fluid Requirements    Fluid Requirements (mL/day) 1800     RDA Method (mL) 1800                Nutrition Prescription Ordered     Row Name 12/21/22 1425          Nutrition Prescription PO    Current PO Diet Regular                     Problem/Interventions:   Problem 1     Row Name 12/21/22 1423          Nutrition Diagnoses Problem 1    Problem 1 Predicted Suboptimal Intake     Etiology (related to) Factors Affecting Nutrition     Appetite Fair     Reported GI Symptoms N & V     Signs/Symptoms (evidenced by) Report of Mnimal PO Intake                      Intervention Goal     Row Name 12/21/22 1420          Intervention Goal    General Maintain nutrition;Meet nutritional needs for age/condition     PO Meet estimated needs;Increase intake     Weight No significant weight loss                Nutrition Intervention     Row Name 12/21/22 1424          Nutrition Intervention    RD/Tech Action Follow Tx progress;Care plan reviewd;Encourage intake;Menu provided                  Education/Evaluation     Row Name 12/21/22 142          Education    Education Education topics     Education Topics Basic nutrition        Monitor/Evaluation    Monitor Per protocol;PO intake;Pertinent labs;Weight;Skin status;Symptoms     Education Follow-up Reinforce PRN                 Electronically signed by:  Daniela Curran  12/21/22 14:26 CST   ,[email or web address]

## 2024-04-28 NOTE — DISCHARGE NOTE NURSING/CASE MANAGEMENT/SOCIAL WORK - PATIENT PORTAL LINK FT
You can access the FollowMyHealth Patient Portal offered by [facility] by registering at the following website: [email or web address]. By joining [employer]’s FollowMyHealth portal, you will also be able to view your health information using other applications (apps) compatible with our system.

## 2024-04-28 NOTE — PROVIDER CONTACT NOTE (OTHER) - SITUATION
Pt. c/o 3/10 chest burning and lightheadedness
/51, HR 48, pt is due for AM med
patient complaining of chest discomfort.

## 2024-04-28 NOTE — PROVIDER CONTACT NOTE (OTHER) - ACTION/TREATMENT ORDERED:
AM labs ordered early. will continue to monitor.
Provider ordered EKG. EKG normal. Maalox was ordered and given for possible gas pain.
Awaiting new orders
provider notified. as per provider, hold AM metoprolol succinate, and push lisinopril to 0830. will cont to monitor

## 2024-04-28 NOTE — DISCHARGE NOTE PROVIDER - HOSPITAL COURSE
Patient is a 73-year-old male with past medical history of A-fib, HLD, HTN, CAD with 2 stents presenting due to chest pain for 2 days that worsens 2 hours ago.  Patient describes chest pain as crushing, in the center chest, and associate with some shortness of breath.  Patient states nothing makes the pain better or worse, and he does not notice increased symptoms with exertion.  Patient denies any fevers, chills, body aches, nausea, vomiting.     Hospital Course: Patient presented to the ED with complaints of chest pain. CXR was done no acute cardiopulmonary disease noted. Patient noted to be bradycardiac home metoprolol held given history of CAD with prior stents cardiology consult called and stress test ordered. Echo -> CONCLUSIONS 1. Left ventricular cavity is normal in size. Left ventricular wall thickness is normal. Left ventricular systolic function is normal with an ejection fraction of 66 % by Faye's method of disks. There are no regional wall motion abnormalities seen. 2. There is severe (grade 3) left ventricular diastolic dysfunction, with elevated filling pressure. 3. Mildly enlarged right ventricular cavity size, with normal wall thickness, and normal systolic function. 4. The left atrium is severely dilated.5. Mild mitral regurgitation. 6. Moderate aortic regurgitation.7. Compared to the transthoracic echocardiogram performed on 4/6/2024, there have been no significant interval changes. Stress test-> Abnormal. Pt seen by Dr. Li (cards) plan for cath. Patient status post cardiac cath via right radial, 1 stent placed to LAD. Patient site CDI +ve radial pulses. Patient remains bradycardiac, continue to hold metoprolol. Patient also noted to have LE swelling, LE doppler negative for DVT. Patient cleared for discharge home per attending Dr. Olvera, medication recs reviewed, patient d/c'd with cardiology and PCP follow-up within 1 week of discharge.     Important Medication Changes and Reason:  Hold Metoprolol 2/2 Bradycardia   Active or Pending Issues Requiring Follow-up:    Advanced Directives:   [x ] Full code  [ ] DNR  [ ] Hospice    Discharge Diagnoses:

## 2024-04-28 NOTE — DISCHARGE NOTE PROVIDER - PROVIDER TOKENS
PROVIDER:[TOKEN:[[id number]],FOLLOWUP:[1 week]],PROVIDER:[TOKEN:[[id number]],FOLLOWUP:[1 week]]

## 2024-04-28 NOTE — PROVIDER CONTACT NOTE (OTHER) - ASSESSMENT
A & O x 4. VS as charted.
104/50 HR 53 96%
/51, HR 48, temp 97.6, RR 18, 95% on RA
VSS. patient states "I cant say its pain, its just discomfort." he said its been going on the past 2 hours.

## 2024-04-28 NOTE — DISCHARGE NOTE PROVIDER - NSDCFUADDAPPT_GEN_ALL_CORE_FT
APPTS ARE READY TO BE MADE: [x ] YES    Best Family or Patient Contact (if needed):    Additional Information about above appointments (if needed):    1: PCP  2: Cardiology   3:     Other comments or requests:    APPTS ARE READY TO BE MADE: [x ] YES    Best Family or Patient Contact (if needed):    Additional Information about above appointments (if needed):    1: PCP  2: Cardiology   3:     Other comments or requests:     Patient advised they did not want to proceed with scheduling appointments with the providers on their referrals. They will coordinate care on their own.

## 2024-04-28 NOTE — DISCHARGE NOTE PROVIDER - NSDCCPCAREPLAN_GEN_ALL_CORE_FT
PRINCIPAL DISCHARGE DIAGNOSIS  Diagnosis: Chest pain  Assessment and Plan of Treatment: You presented to the ED with complaints of chest pain. You had an abnormal stress testing requiring you to undergo a cardiac catherization. Cardiac cath was performed and you had 1 stent placed to the LAD. Please continue to take all medications as prescribed, adhere to low cholesterol, low fat low sodium diet. Increase physical activity as tolerated. Follow-up with your PCP and cardiologist within 1 week of discharge      SECONDARY DISCHARGE DIAGNOSES  Diagnosis: CAD (coronary artery disease)  Assessment and Plan of Treatment: You have a history of CAD and presented to ED with complaints of chest pain. You noted to have increased blockage in the LAD requiring a stent to be placed. Please continue all medications as prescribed, follow-up with your Cardiologist within 1 week of discharge    Diagnosis: S/P cardiac cath  Assessment and Plan of Treatment: Continue your medications. Do not stop Aspirin or Plavix unless instructed by your cardiologist.  No heavy lifting or pushing/pulling or strenuous activity with procedure arm for 2 weeks. No driving for 2 days. No sex for 1 week.  You may shower 24 hours following procedure but no soaking of your wrist in water (such as in a pool, sink, or tub) for 1 week. Check wrist site for bleeding and/or swelling daily following procedure. Call your doctor/cardiologist immediately for bleeding or swelling or if you have increased/persistent pain or drainage at the wrist site or if you have numbness, tingling or blue or white coloring of your hand or fingers.  Follow up with your cardiologist in 1- 2 weeks. You may call [facility] at [phone] or [phone] after office hours and weekends with any questions or concerns following your procedure.

## 2024-04-28 NOTE — DISCHARGE NOTE PROVIDER - CARE PROVIDER_API CALL
Severiano Li  Cardiology  [address] [facility] [facility] [address] [facility], [address] [address]  [place] [address]  Phone: [phone]  Fax: [phone]  Follow Up Time: 1 week    JUNAID BLACKBURN  [address] [place] [address]  Phone: [phone]  Fax: [phone]  Follow Up Time: 1 week

## 2024-04-28 NOTE — PROVIDER CONTACT NOTE (OTHER) - REASON
/51, HR 48
Bradycardic to 30s while sleeping.
patient complaining of chest discomfort.
Chest burning and lightheadedness

## 2024-04-28 NOTE — DISCHARGE NOTE NURSING/CASE MANAGEMENT/SOCIAL WORK - NSDCFUADDAPPT_GEN_ALL_CORE_FT
APPTS ARE READY TO BE MADE: [x ] YES    Best Family or Patient Contact (if needed):    Additional Information about above appointments (if needed):    1: PCP  2: Cardiology   3:     Other comments or requests:

## 2024-04-28 NOTE — DISCHARGE NOTE PROVIDER - NSDCMRMEDTOKEN_GEN_ALL_CORE_FT
aspirin 81 mg oral delayed release tablet: 1 tab(s) orally once a day  atorvastatin 20 mg oral tablet: 1 orally once a day (at bedtime)  clopidogrel 75 mg oral tablet: 1 tab(s) orally once a day  finasteride 5 mg oral tablet: 1 orally once a day  MetFORMIN (Eqv-Fortamet) 1000 mg oral tablet, extended release: 1 tablet, extended release orally once a day RESUME ON MONDAY 4/17  nystatin 100,000 units/g topical powder: Apply topically to affected area 2 times a day apply to right groin fungal rash  ramipril 10 mg oral capsule: 1 cap(s) orally once a day  Xarelto 20 mg oral tablet: 1 tablet orally once a day RESUME TOMORROW 4/15

## 2024-04-29 ENCOUNTER — TRANSCRIPTION ENCOUNTER (OUTPATIENT)
Age: 74
End: 2024-04-29

## 2024-04-29 ENCOUNTER — EMERGENCY (EMERGENCY)
Facility: HOSPITAL | Age: 74
LOS: 1 days | Discharge: HOME | End: 2024-04-29
Attending: EMERGENCY MEDICINE
Payer: MEDICARE

## 2024-04-29 VITALS
TEMPERATURE: 98 F | DIASTOLIC BLOOD PRESSURE: 62 MMHG | WEIGHT: 199.96 LBS | HEIGHT: 69 IN | RESPIRATION RATE: 18 BRPM | OXYGEN SATURATION: 93 % | HEART RATE: 79 BPM | SYSTOLIC BLOOD PRESSURE: 107 MMHG

## 2024-04-29 DIAGNOSIS — Z98.890 OTHER SPECIFIED POSTPROCEDURAL STATES: Chronic | ICD-10-CM

## 2024-04-29 DIAGNOSIS — Z98.62 PERIPHERAL VASCULAR ANGIOPLASTY STATUS: Chronic | ICD-10-CM

## 2024-04-29 DIAGNOSIS — S98.139A COMPLETE TRAUMATIC AMPUTATION OF ONE UNSPECIFIED LESSER TOE, INITIAL ENCOUNTER: Chronic | ICD-10-CM

## 2024-04-29 LAB
ALBUMIN SERPL ELPH-MCNC: 4.2 G/DL — SIGNIFICANT CHANGE UP (ref 3.3–5)
ALP SERPL-CCNC: 58 U/L — SIGNIFICANT CHANGE UP (ref 40–120)
ALT FLD-CCNC: 22 U/L — SIGNIFICANT CHANGE UP (ref 10–45)
ANION GAP SERPL CALC-SCNC: 13 MMOL/L — SIGNIFICANT CHANGE UP (ref 5–17)
AST SERPL-CCNC: 25 U/L — SIGNIFICANT CHANGE UP (ref 10–40)
BASE EXCESS BLDV CALC-SCNC: 3.3 MMOL/L — HIGH (ref -2–3)
BASOPHILS # BLD AUTO: 0.04 K/UL — SIGNIFICANT CHANGE UP (ref 0–0.2)
BASOPHILS NFR BLD AUTO: 0.5 % — SIGNIFICANT CHANGE UP (ref 0–2)
BILIRUB SERPL-MCNC: 0.4 MG/DL — SIGNIFICANT CHANGE UP (ref 0.2–1.2)
BUN SERPL-MCNC: 20 MG/DL — SIGNIFICANT CHANGE UP (ref 7–23)
CA-I SERPL-SCNC: 1.14 MMOL/L — LOW (ref 1.15–1.33)
CALCIUM SERPL-MCNC: 8.8 MG/DL — SIGNIFICANT CHANGE UP (ref 8.4–10.5)
CHLORIDE BLDV-SCNC: 104 MMOL/L — SIGNIFICANT CHANGE UP (ref 96–108)
CHLORIDE SERPL-SCNC: 102 MMOL/L — SIGNIFICANT CHANGE UP (ref 96–108)
CO2 BLDV-SCNC: 29 MMOL/L — HIGH (ref 22–26)
CO2 SERPL-SCNC: 23 MMOL/L — SIGNIFICANT CHANGE UP (ref 22–31)
CREAT SERPL-MCNC: 0.98 MG/DL — SIGNIFICANT CHANGE UP (ref 0.5–1.3)
D DIMER BLD IA.RAPID-MCNC: <150 NG/ML DDU — SIGNIFICANT CHANGE UP
EGFR: 82 ML/MIN/1.73M2 — SIGNIFICANT CHANGE UP
EOSINOPHIL # BLD AUTO: 0.09 K/UL — SIGNIFICANT CHANGE UP (ref 0–0.5)
EOSINOPHIL NFR BLD AUTO: 1 % — SIGNIFICANT CHANGE UP (ref 0–6)
FLUAV AG NPH QL: SIGNIFICANT CHANGE UP
FLUBV AG NPH QL: SIGNIFICANT CHANGE UP
GAS PNL BLDV: 134 MMOL/L — LOW (ref 136–145)
GAS PNL BLDV: SIGNIFICANT CHANGE UP
GAS PNL BLDV: SIGNIFICANT CHANGE UP
GLUCOSE BLDV-MCNC: 150 MG/DL — HIGH (ref 70–99)
GLUCOSE SERPL-MCNC: 151 MG/DL — HIGH (ref 70–99)
HCO3 BLDV-SCNC: 28 MMOL/L — SIGNIFICANT CHANGE UP (ref 22–29)
HCT VFR BLD CALC: 41.2 % — SIGNIFICANT CHANGE UP (ref 39–50)
HCT VFR BLDA CALC: 42 % — SIGNIFICANT CHANGE UP (ref 39–51)
HGB BLD CALC-MCNC: 13.9 G/DL — SIGNIFICANT CHANGE UP (ref 12.6–17.4)
HGB BLD-MCNC: 13.8 G/DL — SIGNIFICANT CHANGE UP (ref 13–17)
IMM GRANULOCYTES NFR BLD AUTO: 0.5 % — SIGNIFICANT CHANGE UP (ref 0–0.9)
LACTATE BLDV-MCNC: 1.1 MMOL/L — SIGNIFICANT CHANGE UP (ref 0.5–2)
LYMPHOCYTES # BLD AUTO: 1.3 K/UL — SIGNIFICANT CHANGE UP (ref 1–3.3)
LYMPHOCYTES # BLD AUTO: 14.9 % — SIGNIFICANT CHANGE UP (ref 13–44)
MCHC RBC-ENTMCNC: 31.6 PG — SIGNIFICANT CHANGE UP (ref 27–34)
MCHC RBC-ENTMCNC: 33.5 GM/DL — SIGNIFICANT CHANGE UP (ref 32–36)
MCV RBC AUTO: 94.3 FL — SIGNIFICANT CHANGE UP (ref 80–100)
MONOCYTES # BLD AUTO: 1.08 K/UL — HIGH (ref 0–0.9)
MONOCYTES NFR BLD AUTO: 12.4 % — SIGNIFICANT CHANGE UP (ref 2–14)
NEUTROPHILS # BLD AUTO: 6.19 K/UL — SIGNIFICANT CHANGE UP (ref 1.8–7.4)
NEUTROPHILS NFR BLD AUTO: 70.7 % — SIGNIFICANT CHANGE UP (ref 43–77)
NRBC # BLD: 0 /100 WBCS — SIGNIFICANT CHANGE UP (ref 0–0)
NT-PROBNP SERPL-SCNC: 228 PG/ML — SIGNIFICANT CHANGE UP (ref 0–300)
PCO2 BLDV: 41 MMHG — LOW (ref 42–55)
PH BLDV: 7.44 — HIGH (ref 7.32–7.43)
PLATELET # BLD AUTO: 105 K/UL — LOW (ref 150–400)
PO2 BLDV: 63 MMHG — HIGH (ref 25–45)
POTASSIUM BLDV-SCNC: 3.9 MMOL/L — SIGNIFICANT CHANGE UP (ref 3.5–5.1)
POTASSIUM SERPL-MCNC: 3.9 MMOL/L — SIGNIFICANT CHANGE UP (ref 3.5–5.3)
POTASSIUM SERPL-SCNC: 3.9 MMOL/L — SIGNIFICANT CHANGE UP (ref 3.5–5.3)
PROT SERPL-MCNC: 7.2 G/DL — SIGNIFICANT CHANGE UP (ref 6–8.3)
RBC # BLD: 4.37 M/UL — SIGNIFICANT CHANGE UP (ref 4.2–5.8)
RBC # FLD: 14.6 % — HIGH (ref 10.3–14.5)
RSV RNA NPH QL NAA+NON-PROBE: SIGNIFICANT CHANGE UP
SAO2 % BLDV: 92 % — HIGH (ref 67–88)
SARS-COV-2 RNA SPEC QL NAA+PROBE: SIGNIFICANT CHANGE UP
SODIUM SERPL-SCNC: 138 MMOL/L — SIGNIFICANT CHANGE UP (ref 135–145)
TROPONIN T, HIGH SENSITIVITY RESULT: 38 NG/L — SIGNIFICANT CHANGE UP (ref 0–51)
WBC # BLD: 8.74 K/UL — SIGNIFICANT CHANGE UP (ref 3.8–10.5)
WBC # FLD AUTO: 8.74 K/UL — SIGNIFICANT CHANGE UP (ref 3.8–10.5)

## 2024-04-29 PROCEDURE — 71045 X-RAY EXAM CHEST 1 VIEW: CPT | Mod: 26

## 2024-04-29 PROCEDURE — 93010 ELECTROCARDIOGRAM REPORT: CPT

## 2024-04-29 PROCEDURE — 99285 EMERGENCY DEPT VISIT HI MDM: CPT

## 2024-04-29 NOTE — ED ADULT NURSE NOTE - OBJECTIVE STATEMENT
PT is a 73 y.o male c.o CP. PT is A&Ox3 resting in stretcher. PT has a PMHX of cardiac cath x2 days ago at which time he had 2 stents placed. PT states the chest pain has not improved since the procedure. PT notified his outside provider who recommended he come to the ED for further evaluation. Of note, pt endorsing laryngitis and URI sx x3 days. Spontaneously breathing on RA>95%, CM afib, ecchymosis noted to RT wrist where insertion of cath, skin otherwise dry and intact, abdomen soft nontender, peripheral pulses present. Pt denies any fever, SOB, N/V/D, HA, chills, change in vision, change in mental status, hemoptysis, dysuria, hematuria or melena. Safety and comfort measures in place bed in lowest position, siderails upright and blanket given.

## 2024-04-29 NOTE — ED ADULT NURSE NOTE - CAS TRG GENERAL AIRWAY, MLM
Detail Level: Zone Photo Preface (Leave Blank If You Do Not Want): Photographs were obtained today Patent

## 2024-04-29 NOTE — ED PROVIDER NOTE - OBJECTIVE STATEMENT
73-year-old male PMH diabetes hyperlipidemia hypothyroidism presenting with chest pain  Patient began with chest pain intermittent since Wednesday midsternal with no radiation on Friday arrived to the emergency department and subsequently a stent was placed.  Since the stent placement patient has had consistent chest pain similar to prior.  Patient also states having burning midsternal pain along with chest pain.  Patient also states being tired and having laryngitis for 3 days and cough for 3 days.  Patient denies shortness of breath palpitations nausea vomiting abdominal pain neck pain upper extremity.  Tingling paresthesias

## 2024-04-29 NOTE — ED PROVIDER NOTE - PHYSICAL EXAMINATION
PE adult male awake alert w hoarse voice, Heent normocephalic atraumatic neck supple chest clear anterior & posterior cv no rubs, gallops or murmurs +irregular abd soft +bs no mass guarding rebound, Neuro gcs 15 speech fluent power 5/5 a all extr  Wm Moe MD, Facep.

## 2024-04-29 NOTE — ED ADULT NURSE REASSESSMENT NOTE - NS ED NURSE REASSESS COMMENT FT1
[facility] room tele tech contacted regarding continuous pulse ox order. RN to be notified if SpO2 <92%.

## 2024-04-29 NOTE — CONSULT NOTE ADULT - SUBJECTIVE AND OBJECTIVE BOX
Patient seen and examined at bedside.    Overnight Events:     REVIEW OF SYSTEMS:  All other review of systems is negative unless indicated above.            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 Patient presented with similar symptoms on 4/25, ultimately had an abnormal stress and went for [facility] with stent to LAD. He says his current chest pain is the same pain that has been ongoing since initially presenting prior to his last stent a few days ago. The pain improved significantly after the stent but still persists. He describes the pain as "burning" like and feels like the pain may be GERD and has had some improvement with Mylanta but was worried about the persistence of pain and so presented to the ED. The pain is not exertional nor pleuritic, and [place]ation of his pain. He additionally reports URI symptoms over the past 2-3 days.      Vitals:  T(F): 98.5 (04-29), Max: 98.5 (04-29)  HR: 74 (04-29) (74 - 79)  BP: 117/61 (04-29) (107/62 - 117/61)  RR: 18 (04-29)  SpO2: 98% (04-29)  I&O's Summary      Physical Exam:  Appearance: No acute distress; well appearing  Eyes:  EOMI  HEENT: Normal oral mucosa  Cardiovascular: RRR, S1, S2, no murmurs, rubs, or gallops; no edema; no JVD  Respiratory: Clear to auscultation bilaterally  Gastrointestinal: soft, non-tender, non-distended  Musculoskeletal: No clubbing;  Neurologic: Non-focal  Psychiatry: AAOx3, mood & affect appropriate                          13.8   8.74  )-----------( 105      ( 29 Apr 2024 21:33 )             41.2     04-29    138  |  102  |  20  ----------------------------<  151<H>  3.9   |  23  |  0.98    Ca    8.8      29 Apr 2024 21:33  Phos  2.3     04-28  Mg     2.2     04-28    TPro  7.2  /  Alb  4.2  /  TBili  0.4  /  DBili  x   /  AST  25  /  ALT  22  /  AlkPhos  58  04-29                  New ECG(s): Personally reviewed    Echo:    Stress Testing:     Cath:    New Imaging:    Interpretation of Telemetry:   Patient seen and examined at bedside.      REVIEW OF SYSTEMS:  All other review of systems is negative unless indicated above.            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 Patient presented with similar symptoms on 4/25, ultimately had an abnormal stress and went for [facility] with stent to LAD. He says his current chest pain is the same pain that has been ongoing since initially presenting prior to his last stent a few days ago. The pain improved significantly after the stent but still persists. He describes the pain as "burning" like and feels like the pain may be GERD and has had some improvement with Mylanta but was worried about the persistence of pain and so presented to the ED. The pain is not exertional nor pleuritic, and [place]ation of his pain. He additionally reports URI symptoms over the past 2-3 days.  In the ED, mild burnign PC symptoms, trop negative, EKG with no acute changes.      Vitals:  T(F): 98.5 (04-29), Max: 98.5 (04-29)  HR: 74 (04-29) (74 - 79)  BP: 117/61 (04-29) (107/62 - 117/61)  RR: 18 (04-29)  SpO2: 98% (04-29)  I&O's Summary      Physical Exam:  Appearance: No acute distress; hoarse vouce  Eyes:  EOMI  HEENT: Normal oral mucosa  Cardiovascular: IRR, S1, S2,  murmur,no rubs, or gallops; no edema; no JVD  Respiratory: Clear to auscultation bilaterally  Gastrointestinal: soft, non-tender, non-distended  Musculoskeletal: No clubbing;  Neurologic: Non-focal  Psychiatry: AAOx3, mood & affect appropriate                          13.8   8.74  )-----------( 105      ( 29 Apr 2024 21:33 )             41.2     04-29    138  |  102  |  20  ----------------------------<  151<H>  3.9   |  23  |  0.98    Ca    8.8      29 Apr 2024 21:33  Phos  2.3     04-28  Mg     2.2     04-28    TPro  7.2  /  Alb  4.2  /  TBili  0.4  /  DBili  x   /  AST  25  /  ALT  22  /  AlkPhos  58  04-29          New ECG(s):       Echo:  CONCLUSIONS:      1. Left ventricular cavity is normal in size. Left ventricular wall thickness is normal. Left ventricular systolic function is normal with an ejection fraction of 66 % by Faye's method of disks. There are no regional wall motion abnormalities seen.   2. There is severe (grade 3) left ventricular diastolic dysfunction, with elevated filling pressure.   3. Mildly enlarged right ventricular cavity size, with normal wall thickness, and normal systolic function.   4. The left atrium is severely dilated.   5. Mild mitral regurgitation.   6. Moderate aortic regurgitation.   7. Compared to the transthoracic echocardiogram performed on 4/6/2024, there have been no significant interval changes.    Stress Testing:     Cath:  4/26/24  LM   Left main artery: Angiography shows no disease.      LAD   Proximal left anterior descending: Patent previously placed stent with  mild ISR. Mid left anterior descending: Lesion is distal to  previously placed stent. There is an 80 % stenosis. Instant wave-free  ratio was performed with a calculated value of 0.84.    CX   Proximal circumflex: There is a 30 % stenosis. First obtuse marginal:  Patent previously placed stent.    RCA   Right coronary artery: Angiography shows mild atherosclerosis.      Left Heart Cath   LV to AO pullback was performed. LHC performed: Aortic valve crossed  and left ventricular pressures were obtained.      Conclusions:   S/P successful PCI of mLAD w/ FELA x1

## 2024-04-29 NOTE — ED PROVIDER NOTE - PATIENT PORTAL LINK FT
You can access the FollowMyHealth Patient Portal offered by [facility] by registering at the following website: [email or web address]. By joining [facility]’s FollowMyHealth portal, you will also be able to view your health information using other applications (apps) compatible with our system.

## 2024-04-29 NOTE — ED PROVIDER NOTE - CLINICAL SUMMARY MEDICAL DECISION MAKING FREE TEXT BOX
Pt concerning for ACS, or stent failure. Will obtain basic labs, troponin, ecg, CXR. Pt likely disposition is home after cardiology recs

## 2024-04-29 NOTE — CONSULT NOTE ADULT - ASSESSMENT
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 Pain is burning, has been present since prior to recent PCI, is non-exertional, and trop is negative with non-ischemic findings on EKG, and he was revascularized recently. His CP is unlikely to be cardiac in nature.    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 Pain is burning, has been present since prior to recent PCI, is non-exertional, and trop is negative with non-ischemic findings on EKG, and he was revascularized recently. His CP is unlikely to be cardiac in nature.    Recommendations  - Continue triple therapy with ASA/Plavix/Xarelto for 1 week from PCI followed by Xarelto and ASA  - Continue home metoprolol succinate 25 mg BID  - No further cardiac testing indicated at this time    Please see attending attestation for final recommendations        Nithin Aguilar MD  Cardiology Fellow     All Cardiology service information can be found 24/7 on [email or web address], password: [employer]    This patient was seen and examined personally by me and the plan was discussed with the fellow and/or resident above. Amendments were made as necessary to the above. Agree with the excellent note and plan above.     Tee Moreno MD, MPhil, [facility]  Cardiologist, [facility]  ; [facility]  Email: [email or web address] on-service contact/call information, go to [email or web address] and use "Unbound" to login.  Outpatient Cardiology appointments, call [phone] to arrange with a colleague; I do not have outpatient Cardiology clinic.

## 2024-04-29 NOTE — ED ADULT NURSE NOTE - NSFALLUNIVINTERV_ED_ALL_ED
Bed/Stretcher in lowest position, wheels locked, appropriate side rails in place/Call bell, personal items and telephone in reach/Instruct patient to call for assistance before getting out of bed/chair/stretcher/Non-slip footwear applied when patient is off stretcher/[place] to call system/Physically safe environment - no spills, clutter or unnecessary equipment/Purposeful proactive rounding/Room/bathroom lighting operational, light cord in reach

## 2024-04-29 NOTE — ED PROVIDER NOTE - ATTENDING CONTRIBUTION TO CARE
Private Physician Last admitted D Wade  73y m pmh Afib,HLD,HTN,CAD sp ptca w #2 STENTS, pad, pvd, dm comes to ed c/o Private Physician Last admitted Rick Roberto  73y m pmh Afib,HLD,HTN,CAD sp ptca w #2 STENTS, pad, pvd, dm. Last admitted last week  w lad stent. Dc yesterday.  PT states "I feel completely exhausted, chest pressure was 7/10 now 4/10" No ppt/alleviating factors. Onset few days ago. Has mild sore throat w cough. mild shortness of breath, no palps.nv,loc,dizziness,diaphoresis,abd pain. Spoke to pmd  referred to ed. Private Physician Last admitted Rick Roberto cards  73y m pmh Afib,HLD,HTN,CAD sp ptca w #2 STENTS, pad, pvd, dm. Last admitted last week  w lad stent. Dc yesterday.  PT states "I feel completely exhausted, chest pressure was 7/10 now 4/10" No ppt/alleviating factors. Onset few days ago. Has mild sore throat w cough. mild shortness of breath, no palps.nv,loc,dizziness,diaphoresis,abd pain. Spoke to pmd office referred to ed.  PE adult male awake alert w hoarse voice, Heent normocephalic atraumatic neck supple chest clear anterior & posterior cv no rubs, gallops or murmurs +irregular abd soft +bs no mass guarding rebound, Neuro gcs 15 speech fluent power 5/5 a all extr  Wm Moe MD, Facep

## 2024-04-29 NOTE — ED PROVIDER NOTE - NSFOLLOWUPINSTRUCTIONS_ED_ALL_ED_FT
You were seen in the emergency department for chest pain.  Please follow up with your cardiologist    Please return if you develop concerning symptoms

## 2024-04-30 VITALS
TEMPERATURE: 99 F | RESPIRATION RATE: 18 BRPM | OXYGEN SATURATION: 99 % | DIASTOLIC BLOOD PRESSURE: 65 MMHG | SYSTOLIC BLOOD PRESSURE: 117 MMHG | HEART RATE: 81 BPM

## 2024-04-30 DIAGNOSIS — I25.10 ATHEROSCLEROTIC HEART DISEASE OF NATIVE CORONARY ARTERY WITHOUT ANGINA PECTORIS: ICD-10-CM

## 2024-04-30 DIAGNOSIS — I48.0 PAROXYSMAL ATRIAL FIBRILLATION: ICD-10-CM

## 2024-04-30 DIAGNOSIS — I73.9 PERIPHERAL VASCULAR DISEASE, UNSPECIFIED: ICD-10-CM

## 2024-04-30 DIAGNOSIS — I20.9 ANGINA PECTORIS, UNSPECIFIED: ICD-10-CM

## 2024-04-30 DIAGNOSIS — I10 ESSENTIAL (PRIMARY) HYPERTENSION: ICD-10-CM

## 2024-04-30 LAB — TROPONIN T, HIGH SENSITIVITY RESULT: 32 NG/L — SIGNIFICANT CHANGE UP (ref 0–51)

## 2024-04-30 PROCEDURE — 84132 ASSAY OF SERUM POTASSIUM: CPT

## 2024-04-30 PROCEDURE — 82803 BLOOD GASES ANY COMBINATION: CPT

## 2024-04-30 PROCEDURE — 83880 ASSAY OF NATRIURETIC PEPTIDE: CPT

## 2024-04-30 PROCEDURE — 71045 X-RAY EXAM CHEST 1 VIEW: CPT

## 2024-04-30 PROCEDURE — 85018 HEMOGLOBIN: CPT

## 2024-04-30 PROCEDURE — 36415 COLL VENOUS BLD VENIPUNCTURE: CPT

## 2024-04-30 PROCEDURE — 85379 FIBRIN DEGRADATION QUANT: CPT

## 2024-04-30 PROCEDURE — 99285 EMERGENCY DEPT VISIT HI MDM: CPT | Mod: 25

## 2024-04-30 PROCEDURE — 82435 ASSAY OF BLOOD CHLORIDE: CPT

## 2024-04-30 PROCEDURE — 82947 ASSAY GLUCOSE BLOOD QUANT: CPT

## 2024-04-30 PROCEDURE — 87637 SARSCOV2&INF A&B&RSV AMP PRB: CPT

## 2024-04-30 PROCEDURE — 85025 COMPLETE CBC W/AUTO DIFF WBC: CPT

## 2024-04-30 PROCEDURE — 82330 ASSAY OF CALCIUM: CPT

## 2024-04-30 PROCEDURE — 84484 ASSAY OF TROPONIN QUANT: CPT

## 2024-04-30 PROCEDURE — 84295 ASSAY OF SERUM SODIUM: CPT

## 2024-04-30 PROCEDURE — 83605 ASSAY OF LACTIC ACID: CPT

## 2024-04-30 PROCEDURE — 80053 COMPREHEN METABOLIC PANEL: CPT

## 2024-04-30 PROCEDURE — 85014 HEMATOCRIT: CPT

## 2024-04-30 PROCEDURE — 93005 ELECTROCARDIOGRAM TRACING: CPT

## 2024-05-30 DIAGNOSIS — Z95.5 PRESENCE OF CORONARY ANGIOPLASTY IMPLANT AND GRAFT: ICD-10-CM

## 2024-05-30 RX ORDER — CLOPIDOGREL 75 MG/1
75 TABLET, FILM COATED ORAL
Refills: 0 | Status: ACTIVE | COMMUNITY

## 2024-05-30 RX ORDER — RIVAROXABAN 20 MG/1
20 TABLET, FILM COATED ORAL
Qty: 30 | Refills: 10 | Status: ACTIVE | COMMUNITY

## 2024-05-30 RX ORDER — ASPIRIN ENTERIC COATED TABLETS 81 MG 81 MG/1
81 TABLET, DELAYED RELEASE ORAL
Refills: 0 | Status: ACTIVE | COMMUNITY

## 2024-06-04 ENCOUNTER — APPOINTMENT (OUTPATIENT)
Dept: CARDIOLOGY | Facility: CLINIC | Age: 74
End: 2024-06-04

## 2024-07-18 ENCOUNTER — APPOINTMENT (OUTPATIENT)
Dept: OPHTHALMOLOGY | Facility: CLINIC | Age: 74
End: 2024-07-18

## 2024-08-18 NOTE — ASU DISCHARGE PLAN (ADULT/PEDIATRIC) - DISCHARGE TO
Goal Outcome Evaluation:      Plan of Care Reviewed With: patient    Overall Patient Progress: improvingOverall Patient Progress: improving    Outcome Evaluation: Pt ambulated x1.  Pt on room air and hemodynamically stable.  Pt continues on antibiotic gtt.  Pt denies headache. Pt with SR.        Home

## 2024-09-27 NOTE — DISCHARGE NOTE NURSING/CASE MANAGEMENT/SOCIAL WORK - FLU SEASON?
Pt experiencing 12 migraine days per month with aura, photophobia, neck pain, and loss of vision in R eye.   Headaches are increasing in frequency and having more severe, new, acute symptoms, especially loss of vision and R hand tingling   Initiate migraine prophylaxis with topiramate 25 mg qhs   MRI brain and orbits ordered for worsening migraines with red flag symptoms (new neurologic symptoms, progressively worsening symptoms)   Pt to keep BP log and migraine journal when she experiences symptoms   Referral for migraine OMT     Orders:    topiramate (TOPAMAX) 25 mg tablet; Take 1 tablet (25 mg total) by mouth daily at bedtime    MRI brain and orbits wo and w contrast; Future    
Pt w/ hx of chronically elevated bilirubin, recently exacerbated in setting of diverticulitis   Suspect Gilbert's syndrome   F/u repeat liver enzymes, bilirubin previously ordered   Referral for migraine OMT          
Yes...

## 2024-10-27 ENCOUNTER — NON-APPOINTMENT (OUTPATIENT)
Age: 74
End: 2024-10-27

## 2024-12-03 NOTE — PATIENT PROFILE ADULT - NSPROPASSIVESMOKEEXPOSURE_GEN_A_NUR
Refill request is for a maintenance medication and has met the criteria specified in the Ambulatory Medication Refill Standing Order for eligibility, visits, laboratory, alerts and was sent to the requested pharmacy.    Requested Prescriptions     Signed Prescriptions Disp Refills    PIOGLITAZONE 45 MG Oral Tab 90 tablet 3     Sig: TAKE 1 TABLET BY MOUTH DAILY     Authorizing Provider: AMBER YORK     Ordering User: OCTAVIO GLEASON        No

## 2024-12-10 NOTE — ED ADULT NURSE NOTE - NS ED NURSE LEVEL OF CONSCIOUSNESS ORIENTATION
Spoke to patient's mother and got the appointment rescheduled to 10:30 tomorrow 12/11/24. She understood and was thankful for the call.    ----- Message from Mary sent at 12/10/2024  3:47 PM CST -----  Contact: Bhavin/mom  Bhavin is needing a call back in regards to r/s the patients appt for an earlier time on 12/11. Please give her a call back at [phone]  
Oriented - self; Oriented - place; Oriented - time

## 2025-03-11 NOTE — OPERATIVE REPORT - OPERATIVE RPOSRT DETAILS
[facility] -  Discharge Note    SW confirmed appointments. Patient has appointments scheduled within 7 day window. Appointment times have been reviewed with PT. PT verbally acknowledges the set appointment times. The appointments scheduled are listed below and on pt discharge summary.    3/18/25 at 3:00pm- Medication management with Blossom Agee  [facility] [place] [facility] [address] [place] [address]  [phone]    3/19/25 at 8:00am- Therapy with Promise Rouse (Virtual)  [facility] [place] [facility]  [address] [id number] [phone]    Pt to discharge to her primary address on file via transportation provided by her , Wolf. Sw completed referral [facility] the [facility] per request. Sw offered to assist PT with contacting outpatient Occupational Therapy clinic to schedule evaluation for work clearance, but PT states she will contact them once she is discharged. Pt requests that her medications be sent to her primary pharmacy on file. No other needs or concerns were expressed.     SW provided resources for the patient in the area. SW also discussed the availability of emergency behavioral health services 24/7 at [facility] through the Emergency Department.  Assisted the patient in identifying risk factors that would indicate the need for higher level of care, such as thoughts to harm self or others and/or self-harming behavior(s). Encouraged patient to call 911, crisis hotlines, or present to the nearest emergency department should symptoms worsen, or in any crisis or emergency. Patient agreeable and voiced understanding.        Electronically Signed By:    HUYEN Alvarado  Date/Time: 3/11/25 10:54   DATE OF SURGERY:8/31/22    Surgeon:  Kristan Martin    PRE-OP DIAGNOSIS: Mature Cataract Left Eye; Small Pupil    POST-OP DIAGNOSIS: Same    ANESTHESIA: MAC    PROCEDURE: Cataract extraction with intraocular lens implant left eye, iris hooks    SPECIMEN/TISSUE REMOVED: None    ESTIMATED BLOOD LOSS: < 1mL    COMPLICATIONS: None    The patient was brought to the operating room.  A drop of topical anesthetic was placed in the eye. The patient received a peribulbar block of lidocaine, marcaine, and wydase in a peribulbar portion of the left eye. The patient was prepped and draped in the usual sterile fashion, including Betadine drops in the eye. A lid speculum was placed between the lids of the left eye. A paracentesis was made inferior as well as 4 paracentesis sites for the introduction of iris hooks. 4 iris hooks were placed to dilate and control the pupil.  Intracameral preservative free lidocaine was instilled and the anterior chamber was filled with air, trypan blue, and viscoelastic.. A clear cornea temporal incision was created using a 2.4mm keratome. A continuous curvilinear capsulorhexis was started with a cystotome and completed with capsulorhexis forceps. The lens was hydrodissected with BSS. The lens was then phacoemulsified using a divide and conquer technique. Residual cortical material was removed using automated irrigation and aspiration. The capsular bag was reformed using a viscoelastic. A ______ lens was inserted into the bag. Symmetric capsular bag fixation was confirmed. The remaining viscoelastic was removed with automated irrigation and aspiration. The 4 iris hooks were removed. The wounds were hydrated and checked to be water tight. The lid speculum was removed. Antibiotic/steroid ointment was instilled in the eye, and a shield was placed over the eye. The patient left the OR in stable condition having tolerated the procedure well. IKristan was present throughout the case. DATE OF SURGERY:8/31/22    Surgeon:  Kristan Martin    PRE-OP DIAGNOSIS: Mature Cataract Left Eye; Small Pupil, flomax    POST-OP DIAGNOSIS: Same    ANESTHESIA: MAC    PROCEDURE: Cataract extraction with intraocular lens implant left eye    SPECIMEN/TISSUE REMOVED: None    ESTIMATED BLOOD LOSS: < 1mL    COMPLICATIONS: None    The patient was brought to the operating room.  A drop of topical anesthetic was placed in the eye. The patient received a peribulbar block of lidocaine, marcaine, and wydase in a peribulbar portion of the left eye. The patient was prepped and draped in the usual sterile fashion, including Betadine drops in the eye. A lid speculum was placed between the lids of the left eye. A paracentesis was made inferior as well as 4 paracentesis sites for the introduction of iris hooks. Intracameral epinephrine was instilled and the anterior chamber was filled with air, trypan blue, and viscoelastic.. A clear cornea temporal incision was created using a 2.4mm keratome. A continuous curvilinear capsulorhexis was started with a cystotome and completed with capsulorhexis forceps. The lens was hydrodissected with BSS. The lens was then phacoemulsified using a divide and conquer technique and cracker. Residual cortical material was removed using automated irrigation and aspiration. The capsular bag was reformed using a viscoelastic. A SN60WF 12.50 lens was inserted into the bag. Symmetric capsular bag fixation was confirmed. The remaining viscoelastic was removed with automated irrigation and aspiration. The wounds were hydrated and checked to be water tight. A single 10-0 nylon  suture was placed in the temporal wound for security. The lid speculum was removed. A patch and shield was placed over the eye after Antibiotic/steroid ointment was instilled in the eye,.The patient left the OR in stable condition having tolerated the procedure well. IKristan was present throughout the case.

## 2025-03-20 ENCOUNTER — APPOINTMENT (OUTPATIENT)
Dept: CARDIOLOGY | Facility: CLINIC | Age: 75
End: 2025-03-20

## 2025-04-22 ENCOUNTER — INPATIENT (INPATIENT)
Facility: HOSPITAL | Age: 75
LOS: 1 days | Discharge: HOME | DRG: 313 | End: 2025-04-24
Attending: INTERNAL MEDICINE | Admitting: INTERNAL MEDICINE
Payer: MEDICARE

## 2025-04-22 ENCOUNTER — TRANSCRIPTION ENCOUNTER (OUTPATIENT)
Age: 75
End: 2025-04-22

## 2025-04-22 VITALS
WEIGHT: 195.11 LBS | OXYGEN SATURATION: 96 % | HEIGHT: 69 IN | SYSTOLIC BLOOD PRESSURE: 104 MMHG | RESPIRATION RATE: 18 BRPM | HEART RATE: 53 BPM | DIASTOLIC BLOOD PRESSURE: 66 MMHG | TEMPERATURE: 98 F

## 2025-04-22 DIAGNOSIS — Z98.890 OTHER SPECIFIED POSTPROCEDURAL STATES: Chronic | ICD-10-CM

## 2025-04-22 DIAGNOSIS — S98.139A COMPLETE TRAUMATIC AMPUTATION OF ONE UNSPECIFIED LESSER TOE, INITIAL ENCOUNTER: Chronic | ICD-10-CM

## 2025-04-22 DIAGNOSIS — R07.9 CHEST PAIN, UNSPECIFIED: ICD-10-CM

## 2025-04-22 DIAGNOSIS — Z98.62 PERIPHERAL VASCULAR ANGIOPLASTY STATUS: Chronic | ICD-10-CM

## 2025-04-22 LAB
ALBUMIN SERPL ELPH-MCNC: 4.2 G/DL — SIGNIFICANT CHANGE UP (ref 3.3–5)
ALP SERPL-CCNC: 53 U/L — SIGNIFICANT CHANGE UP (ref 40–120)
ALT FLD-CCNC: 14 U/L — SIGNIFICANT CHANGE UP (ref 10–45)
ANION GAP SERPL CALC-SCNC: 12 MMOL/L — SIGNIFICANT CHANGE UP (ref 5–17)
APTT BLD: 37.4 SEC — HIGH (ref 26.1–36.8)
AST SERPL-CCNC: 18 U/L — SIGNIFICANT CHANGE UP (ref 10–40)
BASOPHILS # BLD AUTO: 0.03 K/UL — SIGNIFICANT CHANGE UP (ref 0–0.2)
BASOPHILS NFR BLD AUTO: 0.6 % — SIGNIFICANT CHANGE UP (ref 0–2)
BILIRUB SERPL-MCNC: 0.8 MG/DL — SIGNIFICANT CHANGE UP (ref 0.2–1.2)
BUN SERPL-MCNC: 31 MG/DL — HIGH (ref 7–23)
CALCIUM SERPL-MCNC: 9.3 MG/DL — SIGNIFICANT CHANGE UP (ref 8.4–10.5)
CHLORIDE SERPL-SCNC: 103 MMOL/L — SIGNIFICANT CHANGE UP (ref 96–108)
CO2 SERPL-SCNC: 22 MMOL/L — SIGNIFICANT CHANGE UP (ref 22–31)
CREAT SERPL-MCNC: 0.97 MG/DL — SIGNIFICANT CHANGE UP (ref 0.5–1.3)
EGFR: 82 ML/MIN/1.73M2 — SIGNIFICANT CHANGE UP
EGFR: 82 ML/MIN/1.73M2 — SIGNIFICANT CHANGE UP
EOSINOPHIL # BLD AUTO: 0.08 K/UL — SIGNIFICANT CHANGE UP (ref 0–0.5)
EOSINOPHIL NFR BLD AUTO: 1.6 % — SIGNIFICANT CHANGE UP (ref 0–6)
GLUCOSE BLDC GLUCOMTR-MCNC: 126 MG/DL — HIGH (ref 70–99)
GLUCOSE SERPL-MCNC: 95 MG/DL — SIGNIFICANT CHANGE UP (ref 70–99)
HCT VFR BLD CALC: 39.2 % — SIGNIFICANT CHANGE UP (ref 39–50)
HGB BLD-MCNC: 12.9 G/DL — LOW (ref 13–17)
IMM GRANULOCYTES NFR BLD AUTO: 0.4 % — SIGNIFICANT CHANGE UP (ref 0–0.9)
INR BLD: 1.4 RATIO — HIGH (ref 0.85–1.16)
LYMPHOCYTES # BLD AUTO: 1.07 K/UL — SIGNIFICANT CHANGE UP (ref 1–3.3)
LYMPHOCYTES # BLD AUTO: 21 % — SIGNIFICANT CHANGE UP (ref 13–44)
MCHC RBC-ENTMCNC: 30.9 PG — SIGNIFICANT CHANGE UP (ref 27–34)
MCHC RBC-ENTMCNC: 32.9 G/DL — SIGNIFICANT CHANGE UP (ref 32–36)
MCV RBC AUTO: 93.8 FL — SIGNIFICANT CHANGE UP (ref 80–100)
MONOCYTES # BLD AUTO: 0.6 K/UL — SIGNIFICANT CHANGE UP (ref 0–0.9)
MONOCYTES NFR BLD AUTO: 11.8 % — SIGNIFICANT CHANGE UP (ref 2–14)
NEUTROPHILS # BLD AUTO: 3.3 K/UL — SIGNIFICANT CHANGE UP (ref 1.8–7.4)
NEUTROPHILS NFR BLD AUTO: 64.6 % — SIGNIFICANT CHANGE UP (ref 43–77)
NRBC BLD AUTO-RTO: 0 /100 WBCS — SIGNIFICANT CHANGE UP (ref 0–0)
NT-PROBNP SERPL-SCNC: 481 PG/ML — HIGH (ref 0–300)
PLATELET # BLD AUTO: 123 K/UL — LOW (ref 150–400)
POTASSIUM SERPL-MCNC: 4.9 MMOL/L — SIGNIFICANT CHANGE UP (ref 3.5–5.3)
POTASSIUM SERPL-SCNC: 4.9 MMOL/L — SIGNIFICANT CHANGE UP (ref 3.5–5.3)
PROT SERPL-MCNC: 7.2 G/DL — SIGNIFICANT CHANGE UP (ref 6–8.3)
PROTHROM AB SERPL-ACNC: 15.9 SEC — HIGH (ref 9.9–13.4)
RBC # BLD: 4.18 M/UL — LOW (ref 4.2–5.8)
RBC # FLD: 14.6 % — HIGH (ref 10.3–14.5)
SODIUM SERPL-SCNC: 137 MMOL/L — SIGNIFICANT CHANGE UP (ref 135–145)
TROPONIN T, HIGH SENSITIVITY RESULT: 26 NG/L — SIGNIFICANT CHANGE UP (ref 0–51)
TROPONIN T, HIGH SENSITIVITY RESULT: 27 NG/L — SIGNIFICANT CHANGE UP (ref 0–51)
WBC # BLD: 5.1 K/UL — SIGNIFICANT CHANGE UP (ref 3.8–10.5)
WBC # FLD AUTO: 5.1 K/UL — SIGNIFICANT CHANGE UP (ref 3.8–10.5)

## 2025-04-22 PROCEDURE — 99285 EMERGENCY DEPT VISIT HI MDM: CPT | Mod: FS,GC

## 2025-04-22 PROCEDURE — 93010 ELECTROCARDIOGRAM REPORT: CPT

## 2025-04-22 PROCEDURE — 71045 X-RAY EXAM CHEST 1 VIEW: CPT | Mod: 26

## 2025-04-22 RX ORDER — INSULIN LISPRO 100 U/ML
INJECTION, SOLUTION INTRAVENOUS; SUBCUTANEOUS
Refills: 0 | Status: DISCONTINUED | OUTPATIENT
Start: 2025-04-22 | End: 2025-04-24

## 2025-04-22 RX ORDER — ATORVASTATIN CALCIUM 80 MG/1
40 TABLET, FILM COATED ORAL AT BEDTIME
Refills: 0 | Status: DISCONTINUED | OUTPATIENT
Start: 2025-04-22 | End: 2025-04-24

## 2025-04-22 RX ORDER — SODIUM CHLORIDE 9 G/1000ML
1000 INJECTION, SOLUTION INTRAVENOUS
Refills: 0 | Status: DISCONTINUED | OUTPATIENT
Start: 2025-04-22 | End: 2025-04-24

## 2025-04-22 RX ORDER — INFLUENZA A VIRUS A/IDAHO/07/2018 (H1N1) ANTIGEN (MDCK CELL DERIVED, PROPIOLACTONE INACTIVATED, INFLUENZA A VIRUS A/INDIANA/08/2018 (H3N2) ANTIGEN (MDCK CELL DERIVED, PROPIOLACTONE INACTIVATED), INFLUENZA B VIRUS B/SINGAPORE/INFTT-16-0610/2016 ANTIGEN (MDCK CELL DERIVED, PROPIOLACTONE INACTIVATED), INFLUENZA B VIRUS B/IOWA/06/2017 ANTIGEN (MDCK CELL DERIVED, PROPIOLACTONE INACTIVATED) 15; 15; 15; 15 UG/.5ML; UG/.5ML; UG/.5ML; UG/.5ML
0.5 INJECTION, SUSPENSION INTRAMUSCULAR ONCE
Refills: 0 | Status: DISCONTINUED | OUTPATIENT
Start: 2025-04-22 | End: 2025-04-24

## 2025-04-22 RX ORDER — DEXTROSE 50 % IN WATER 50 %
12.5 SYRINGE (ML) INTRAVENOUS ONCE
Refills: 0 | Status: DISCONTINUED | OUTPATIENT
Start: 2025-04-22 | End: 2025-04-24

## 2025-04-22 RX ORDER — DEXTROSE 50 % IN WATER 50 %
25 SYRINGE (ML) INTRAVENOUS ONCE
Refills: 0 | Status: DISCONTINUED | OUTPATIENT
Start: 2025-04-22 | End: 2025-04-24

## 2025-04-22 RX ORDER — DEXTROSE 50 % IN WATER 50 %
15 SYRINGE (ML) INTRAVENOUS ONCE
Refills: 0 | Status: DISCONTINUED | OUTPATIENT
Start: 2025-04-22 | End: 2025-04-24

## 2025-04-22 RX ORDER — METOPROLOL SUCCINATE 50 MG/1
50 TABLET, EXTENDED RELEASE ORAL DAILY
Refills: 0 | Status: DISCONTINUED | OUTPATIENT
Start: 2025-04-22 | End: 2025-04-23

## 2025-04-22 RX ORDER — TAMSULOSIN HYDROCHLORIDE 0.4 MG/1
0.4 CAPSULE ORAL AT BEDTIME
Refills: 0 | Status: DISCONTINUED | OUTPATIENT
Start: 2025-04-22 | End: 2025-04-24

## 2025-04-22 RX ORDER — GLUCAGON 3 MG/1
1 POWDER NASAL ONCE
Refills: 0 | Status: DISCONTINUED | OUTPATIENT
Start: 2025-04-22 | End: 2025-04-24

## 2025-04-22 RX ORDER — RAMIPRIL 2.5 MG/1
0 CAPSULE ORAL
Refills: 0 | DISCHARGE

## 2025-04-22 RX ORDER — BRIMONIDINE TARTRATE 1.5 MG/ML
1 SOLUTION/ DROPS OPHTHALMIC
Refills: 0 | Status: DISCONTINUED | OUTPATIENT
Start: 2025-04-22 | End: 2025-04-24

## 2025-04-22 RX ORDER — LATANOPROST PF 0.05 MG/ML
1 SOLUTION/ DROPS OPHTHALMIC AT BEDTIME
Refills: 0 | Status: DISCONTINUED | OUTPATIENT
Start: 2025-04-22 | End: 2025-04-24

## 2025-04-22 RX ORDER — RIVAROXABAN 10 MG/1
20 TABLET, FILM COATED ORAL
Refills: 0 | Status: DISCONTINUED | OUTPATIENT
Start: 2025-04-22 | End: 2025-04-23

## 2025-04-22 RX ORDER — LISINOPRIL 5 MG/1
40 TABLET ORAL DAILY
Refills: 0 | Status: DISCONTINUED | OUTPATIENT
Start: 2025-04-22 | End: 2025-04-24

## 2025-04-22 RX ORDER — CLOPIDOGREL BISULFATE 75 MG/1
75 TABLET, FILM COATED ORAL DAILY
Refills: 0 | Status: DISCONTINUED | OUTPATIENT
Start: 2025-04-22 | End: 2025-04-24

## 2025-04-22 RX ORDER — FINASTERIDE 1 MG/1
5 TABLET, FILM COATED ORAL DAILY
Refills: 0 | Status: DISCONTINUED | OUTPATIENT
Start: 2025-04-22 | End: 2025-04-24

## 2025-04-22 RX ADMIN — LATANOPROST PF 1 DROP(S): 0.05 SOLUTION/ DROPS OPHTHALMIC at 22:14

## 2025-04-22 RX ADMIN — CLOPIDOGREL BISULFATE 75 MILLIGRAM(S): 75 TABLET, FILM COATED ORAL at 22:13

## 2025-04-22 RX ADMIN — RIVAROXABAN 20 MILLIGRAM(S): 10 TABLET, FILM COATED ORAL at 22:13

## 2025-04-22 RX ADMIN — TAMSULOSIN HYDROCHLORIDE 0.4 MILLIGRAM(S): 0.4 CAPSULE ORAL at 21:21

## 2025-04-22 RX ADMIN — ATORVASTATIN CALCIUM 40 MILLIGRAM(S): 80 TABLET, FILM COATED ORAL at 21:21

## 2025-04-22 NOTE — PATIENT PROFILE ADULT - FALL HARM RISK - HARM RISK INTERVENTIONS

## 2025-04-22 NOTE — ED PROVIDER NOTE - OBJECTIVE STATEMENT
Patient is a 74-year-old male with past medical history of A-fib, CAD status post stents who presents emergency department complaining of chest pain.  Patient states that he has started to have chest pain today.  Patient states he is also had substernal chest pressure for the last few days.  Complaining of mild shortness of breath.  Denies any exertional dyspnea.  Denies any fevers, chills, palpitations, headaches, dizziness, syncope.  Patient states he is compliant with his medications.  States he has not follow-up with a cardiologist in quite some time.  Denies any smoking or traveling history.

## 2025-04-22 NOTE — H&P ADULT - NSHPPHYSICALEXAM_GEN_ALL_CORE
Vital Signs Last 24 Hrs  T(C): 36.9 (22 Apr 2025 18:27), Max: 36.9 (22 Apr 2025 18:27)  T(F): 98.5 (22 Apr 2025 18:27), Max: 98.5 (22 Apr 2025 18:27)  HR: 56 (22 Apr 2025 18:27) (53 - 56)  BP: 123/60 (22 Apr 2025 18:27) (104/66 - 123/60)  BP(mean): 80 (22 Apr 2025 17:02) (80 - 80)  RR: 18 (22 Apr 2025 18:27) (18 - 18)  SpO2: 98% (22 Apr 2025 18:27) (96% - 98%)    Parameters below as of 22 Apr 2025 18:27  Patient On (Oxygen Delivery Method): room air        PHYSICAL EXAM:  GENERAL: NAD, well-developed  HEAD:  Atraumatic, Normocephalic  EYES: EOMI, PERRLA, conjunctiva and sclera clear  NECK: Supple, No JVD  CHEST/LUNG: Clear to auscultation bilaterally; No wheeze  HEART: Regular rate and rhythm; No murmurs, rubs, or gallops  ABDOMEN: Soft, Nontender, Nondistended; Bowel sounds present  EXTREMITIES:  2+ Peripheral Pulses, No clubbing, cyanosis, or edema  PSYCH: AAOx3  NEUROLOGY: non-focal  SKIN: No rashes or lesions

## 2025-04-22 NOTE — H&P ADULT - HISTORY OF PRESENT ILLNESS
74-year-old male with past medical history of A-fib on xarelto, CAD status post stents, dm,  who presents emergency department complaining of chest pain.  Patient states that he has started to have chest pain today.  Patient states he is also had substernal chest pressure for the last few days.  Complaining of mild shortness of breath.  Denies any exertional dyspnea.  Denies any fevers, chills, palpitations, headaches, dizziness, syncope.  Patient states he is compliant with his medications.  States he has not follow-up with a cardiologist in quite some time.  Denies any smoking or traveling history.

## 2025-04-22 NOTE — ED PROVIDER NOTE - MDM ORDERS SUBMITTED SELECTION
Care Transitions Program Weekly Follow-up Call    Current Issues/Problems reviewed on call:   Patient states she has adjusted to Duloxetine change. Patient states she had a few bad pain days, but today is slightly better. Patient has all over chronic body pain.     Review of Systems:   Care Transition System Evaluation:    General Symptoms:    Pain Location: chronic all over body pain  Respiratory Symptoms: Nasal stuffiness; Shortness of breath with pain.   GI Symptoms: WDL (absence of symptoms)   Symptoms: Incontinence      Next Care Transitions follow-up call will be within 1 week.     Plan for next follow-up call: Routine Care Transitions assessment, needs identification and ongoing support.    Imaging Studies/Medications

## 2025-04-22 NOTE — ED ADULT NURSE NOTE - OBJECTIVE STATEMENT
74y Female PMHx DM, HTN, HLD, afib, CAD and PAD presenting to ED via walk-in triage for substernal cp x days. Pt states he's had midsternal pressure-like pain x 2d a/w SOB. Pt stats symptoms began at rest and have been more constant since onset. Pt has not followed with cards recently but has been seen in the past. Pt denies nausea, vomiting, fever, chills, cough, palpitations, headache, dizziness, syncope. IV intact from QRN, seen and eval by MD, stretcher in lowest position and locked, call bell within reach.

## 2025-04-22 NOTE — ED PROVIDER NOTE - RAPID ASSESSMENT
74-year-old male with a past medical history of diabetes, hypertension, hyperlipidemia, CAD, PAD presents with substernal chest pressure x days.  Patient reports feeling short of breath as well.  Symptoms started at rest and have been becoming more constant since onset.  Has been seen previously by Dr. Judith andrews but has not been seen recently.  Denies nausea, vomiting, fever, chills, cough, palpitations, headache, dizziness, syncope.      **Patient was rapidly assessed in triage by Roberta velasquez PA-C. A limited history was obtained. The patient will be seen and further examined and worked up in the main ED and their care will be completed by the main ED team. Receiving team will follow up on labs, medications, any clinical imaging, and perform reassessment and disposition of the patient as clinically indicated. All decisions regarding the progression of care will be made at their discretion.

## 2025-04-22 NOTE — ED ADULT NURSE NOTE - NSFALLHARMRISKINTERV_ED_ALL_ED
Assistance OOB with selected safe patient handling equipment if applicable/Assistance with ambulation/Communicate risk of Fall with Harm to all staff, patient, and family/Monitor gait and stability/Provide visual cue: red socks, yellow wristband, yellow gown, etc/Reinforce activity limits and safety measures with patient and family/Bed in lowest position, wheels locked, appropriate side rails in place/Call bell, personal items and telephone in reach/Instruct patient to call for assistance before getting out of bed/chair/stretcher/Non-slip footwear applied when patient is off stretcher/[place] to call system/Physically safe environment - no spills, clutter or unnecessary equipment/Purposeful Proactive Rounding/Room/bathroom lighting operational, light cord in reach

## 2025-04-22 NOTE — ED PROVIDER NOTE - ATTENDING CONTRIBUTION TO CARE
I performed a history and physical exam of the patient and discussed their management with the resident. I reviewed the resident's note and agree with the documented findings and plan of care. My medical decision making and observations are found above.     In short, 75 yo M with PMHx of CAD, diabetes, hypertension, hyperlipidemia presents with chest pain   PE: well appearing, nontoxic, no respiratory distress.  Neuro nonfocal.  Skin intact. Psych normal mood.    MDM: Patient with significant risk factors for high risk ACS event   Will require hospital admission for further evaluation, telemetry and cardiology consultation

## 2025-04-22 NOTE — ED PROVIDER NOTE - NS ED MD DISPO DIVISION
Pt remains in paper scrubs.  Room free from hazardous items.  Sitter remains at bedside with direct visual contact and q15min assessments being documented.  Pt in NAD and VSS at this time.  Pending medical clearance and psych placement.   [facility]

## 2025-04-22 NOTE — ED ADULT TRIAGE NOTE - HOW PATIENT ADDRESSED, PROFILE
[id number] Patient arrived to the [facility] via medical transport. Report was received from [facility]. Patient being admitted for respite due to caregiver breakdown. Patient arrived with his personal belongings and medications. Patient oriented to his room, and instructed in use of his call bell, understanding verbalized. Patient denied pain/discomfort. Patient's care assumed by [facility], LPN. Basil

## 2025-04-22 NOTE — H&P ADULT - ASSESSMENT
74 male h/o cad s/p pci, afib on xarelto, dm, here with chest pain     chest pain  acs ruled out  cards consulted  check TTE  check stress    cad s/p pci  cont plavix  statin    afib  ac with xarelto  rate control    cont other home meds    Advanced care planning was discussed with patient and family.  Advanced care planning forms were reviewed and discussed as appropriate.  Differential diagnosis and plan of care discussed with patient after the evaluation.   Pain assessed and judicious use of narcotics when appropriate was discussed.  Importance of Fall prevention discussed.  Counseling on Smoking and Alcohol cessation was offered when appropriate.  Counseling on Diet, exercise, and medication compliance was done.

## 2025-04-22 NOTE — ED ADULT NURSE NOTE - NSICDXPASTMEDICALHX_GEN_ALL_CORE_FT
PAST MEDICAL HISTORY:  Atrial fibrillation     CAD (coronary artery disease)     Diabetes 1.5, managed as type 2     H/O varicose veins     HLD (hyperlipidemia)     HTN (hypertension)     PAD (peripheral artery disease)     PVD (peripheral vascular disease)     Skin ulcer of ankle     Skin ulcer of left great toe      No

## 2025-04-22 NOTE — ED ADULT TRIAGE NOTE - CHIEF COMPLAINT QUOTE
midsternal non radiating chest pain x2 days, states it was intermittent but is now constant   PMH cardiac stents patient on Xarelto  Denies SOB

## 2025-04-22 NOTE — ED PROVIDER NOTE - CLINICAL SUMMARY MEDICAL DECISION MAKING FREE TEXT BOX
Patient presents emergency room complaining of chest pain.  Patient is found to be in A-fib with a ventricular rate of 50 on EKG.  Similar to prior EKG of last year.  Patient is alert and oriented.  Oral mucosa moist.  Mild chronic bilateral lower extremity edema.  Abdomen soft nontender nondistended.  Heart sounds normal, lungs sound clear.  Given patient presentation and history we did lab work and imaging to evaluate for acute pathology.  Pending labs and imaging for further disposition.  Likely admission for cardiology consulted further workup.

## 2025-04-23 ENCOUNTER — RESULT REVIEW (OUTPATIENT)
Age: 75
End: 2025-04-23

## 2025-04-23 ENCOUNTER — TRANSCRIPTION ENCOUNTER (OUTPATIENT)
Age: 75
End: 2025-04-23

## 2025-04-23 DIAGNOSIS — I48.91 UNSPECIFIED ATRIAL FIBRILLATION: ICD-10-CM

## 2025-04-23 DIAGNOSIS — E78.5 HYPERLIPIDEMIA, UNSPECIFIED: ICD-10-CM

## 2025-04-23 DIAGNOSIS — E11.9 TYPE 2 DIABETES MELLITUS WITHOUT COMPLICATIONS: ICD-10-CM

## 2025-04-23 DIAGNOSIS — R07.9 CHEST PAIN, UNSPECIFIED: ICD-10-CM

## 2025-04-23 DIAGNOSIS — I10 ESSENTIAL (PRIMARY) HYPERTENSION: ICD-10-CM

## 2025-04-23 DIAGNOSIS — R00.1 BRADYCARDIA, UNSPECIFIED: ICD-10-CM

## 2025-04-23 DIAGNOSIS — I25.10 ATHEROSCLEROTIC HEART DISEASE OF NATIVE CORONARY ARTERY WITHOUT ANGINA PECTORIS: ICD-10-CM

## 2025-04-23 LAB
A1C WITH ESTIMATED AVERAGE GLUCOSE RESULT: 5.6 % — SIGNIFICANT CHANGE UP (ref 4–5.6)
ADD ON TEST-SPECIMEN IN LAB: SIGNIFICANT CHANGE UP
ANION GAP SERPL CALC-SCNC: 12 MMOL/L — SIGNIFICANT CHANGE UP (ref 5–17)
BUN SERPL-MCNC: 26 MG/DL — HIGH (ref 7–23)
CALCIUM SERPL-MCNC: 9.3 MG/DL — SIGNIFICANT CHANGE UP (ref 8.4–10.5)
CHLORIDE SERPL-SCNC: 104 MMOL/L — SIGNIFICANT CHANGE UP (ref 96–108)
CO2 SERPL-SCNC: 24 MMOL/L — SIGNIFICANT CHANGE UP (ref 22–31)
CREAT SERPL-MCNC: 0.94 MG/DL — SIGNIFICANT CHANGE UP (ref 0.5–1.3)
EGFR: 86 ML/MIN/1.73M2 — SIGNIFICANT CHANGE UP
EGFR: 86 ML/MIN/1.73M2 — SIGNIFICANT CHANGE UP
ESTIMATED AVERAGE GLUCOSE: 114 MG/DL — SIGNIFICANT CHANGE UP (ref 68–114)
GLUCOSE BLDC GLUCOMTR-MCNC: 109 MG/DL — HIGH (ref 70–99)
GLUCOSE BLDC GLUCOMTR-MCNC: 121 MG/DL — HIGH (ref 70–99)
GLUCOSE BLDC GLUCOMTR-MCNC: 151 MG/DL — HIGH (ref 70–99)
GLUCOSE BLDC GLUCOMTR-MCNC: 153 MG/DL — HIGH (ref 70–99)
GLUCOSE SERPL-MCNC: 134 MG/DL — HIGH (ref 70–99)
HCT VFR BLD CALC: 42.7 % — SIGNIFICANT CHANGE UP (ref 39–50)
HGB BLD-MCNC: 13.5 G/DL — SIGNIFICANT CHANGE UP (ref 13–17)
MAGNESIUM SERPL-MCNC: 1.8 MG/DL — SIGNIFICANT CHANGE UP (ref 1.6–2.6)
MCHC RBC-ENTMCNC: 30.3 PG — SIGNIFICANT CHANGE UP (ref 27–34)
MCHC RBC-ENTMCNC: 31.6 G/DL — LOW (ref 32–36)
MCV RBC AUTO: 95.7 FL — SIGNIFICANT CHANGE UP (ref 80–100)
NRBC BLD AUTO-RTO: 0 /100 WBCS — SIGNIFICANT CHANGE UP (ref 0–0)
PHOSPHATE SERPL-MCNC: 2.8 MG/DL — SIGNIFICANT CHANGE UP (ref 2.5–4.5)
PLATELET # BLD AUTO: 121 K/UL — LOW (ref 150–400)
POTASSIUM SERPL-MCNC: 4.4 MMOL/L — SIGNIFICANT CHANGE UP (ref 3.5–5.3)
POTASSIUM SERPL-SCNC: 4.4 MMOL/L — SIGNIFICANT CHANGE UP (ref 3.5–5.3)
RBC # BLD: 4.46 M/UL — SIGNIFICANT CHANGE UP (ref 4.2–5.8)
RBC # FLD: 14.6 % — HIGH (ref 10.3–14.5)
SODIUM SERPL-SCNC: 140 MMOL/L — SIGNIFICANT CHANGE UP (ref 135–145)
TROPONIN T, HIGH SENSITIVITY RESULT: 27 NG/L — SIGNIFICANT CHANGE UP (ref 0–51)
WBC # BLD: 4.39 K/UL — SIGNIFICANT CHANGE UP (ref 3.8–10.5)
WBC # FLD AUTO: 4.39 K/UL — SIGNIFICANT CHANGE UP (ref 3.8–10.5)

## 2025-04-23 PROCEDURE — 93306 TTE W/DOPPLER COMPLETE: CPT | Mod: 26

## 2025-04-23 RX ORDER — MAGNESIUM, ALUMINUM HYDROXIDE 200-200 MG
30 TABLET,CHEWABLE ORAL ONCE
Refills: 0 | Status: COMPLETED | OUTPATIENT
Start: 2025-04-23 | End: 2025-04-23

## 2025-04-23 RX ADMIN — INSULIN LISPRO 1: 100 INJECTION, SOLUTION INTRAVENOUS; SUBCUTANEOUS at 07:58

## 2025-04-23 RX ADMIN — Medication 30 MILLILITER(S): at 13:14

## 2025-04-23 RX ADMIN — CLOPIDOGREL BISULFATE 75 MILLIGRAM(S): 75 TABLET, FILM COATED ORAL at 13:14

## 2025-04-23 RX ADMIN — FINASTERIDE 5 MILLIGRAM(S): 1 TABLET, FILM COATED ORAL at 13:14

## 2025-04-23 RX ADMIN — LISINOPRIL 40 MILLIGRAM(S): 5 TABLET ORAL at 05:18

## 2025-04-23 RX ADMIN — BRIMONIDINE TARTRATE 1 DROP(S): 1.5 SOLUTION/ DROPS OPHTHALMIC at 17:24

## 2025-04-23 RX ADMIN — Medication 20 MILLIGRAM(S): at 13:14

## 2025-04-23 RX ADMIN — TAMSULOSIN HYDROCHLORIDE 0.4 MILLIGRAM(S): 0.4 CAPSULE ORAL at 21:14

## 2025-04-23 RX ADMIN — LATANOPROST PF 1 DROP(S): 0.05 SOLUTION/ DROPS OPHTHALMIC at 21:14

## 2025-04-23 RX ADMIN — BRIMONIDINE TARTRATE 1 DROP(S): 1.5 SOLUTION/ DROPS OPHTHALMIC at 05:19

## 2025-04-23 RX ADMIN — ATORVASTATIN CALCIUM 40 MILLIGRAM(S): 80 TABLET, FILM COATED ORAL at 21:13

## 2025-04-23 RX ADMIN — INSULIN LISPRO 1: 100 INJECTION, SOLUTION INTRAVENOUS; SUBCUTANEOUS at 17:25

## 2025-04-23 NOTE — CONSULT NOTE ADULT - SUBJECTIVE AND OBJECTIVE BOX
Patient is a 74y old  Male who presents with a chief complaint of     HPI:   74-year-old male with past medical history of A-fib on xarelto, CAD status post stents, dm,  who presents emergency department complaining of chest pain.  Patient states that he has started to have chest pain today.  Patient states he is also had substernal chest pressure for the last few days.  Complaining of mild shortness of breath.  Denies any exertional dyspnea.  Denies any fevers, chills, palpitations, headaches, dizziness, syncope.  Patient states he is compliant with his medications.  States he has not follow-up with a cardiologist in quite some time.  Denies any smoking or traveling history. (22 Apr 2025 20:46)      PAST MEDICAL & SURGICAL HISTORY:  HTN (hypertension)      HLD (hyperlipidemia)      Diabetes 1.5, managed as type 2      PVD (peripheral vascular disease)      Atrial fibrillation      CAD (coronary artery disease)      PAD (peripheral artery disease)      H/O varicose veins      Skin ulcer of left great toe      Skin ulcer of ankle      H/O cardiac catheterization      S/P peripheral artery angioplasty      Amputation of toe      History of prostate surgery          MEDICATIONS  (STANDING):  atorvastatin 40 milliGRAM(s) Oral at bedtime  brimonidine 0.2% Ophthalmic Solution 1 Drop(s) Both EYES two times a day  clopidogrel Tablet 75 milliGRAM(s) Oral daily  dextrose 5%. 1000 milliLiter(s) (50 mL/Hr) IV Continuous <Continuous>  dextrose 5%. 1000 milliLiter(s) (100 mL/Hr) IV Continuous <Continuous>  dextrose 50% Injectable 25 Gram(s) IV Push once  dextrose 50% Injectable 12.5 Gram(s) IV Push once  dextrose 50% Injectable 25 Gram(s) IV Push once  finasteride 5 milliGRAM(s) Oral daily  glucagon  Injectable 1 milliGRAM(s) IntraMuscular once  influenza  Vaccine (HIGH DOSE) 0.5 milliLiter(s) IntraMuscular once  insulin lispro (ADMELOG) corrective regimen sliding scale   SubCutaneous three times a day before meals  latanoprost 0.005% Ophthalmic Solution 1 Drop(s) Both EYES at bedtime  lisinopril 40 milliGRAM(s) Oral daily  tamsulosin 0.4 milliGRAM(s) Oral at bedtime    MEDICATIONS  (PRN):  dextrose Oral Gel 15 Gram(s) Oral once PRN Blood Glucose LESS THAN 70 milliGRAM(s)/deciliter      Allergies    No Known Allergies    Intolerances        VITALS:    Vital Signs Last 24 Hrs  T(C): 36.4 (23 Apr 2025 13:17), Max: 36.9 (22 Apr 2025 18:27)  T(F): 97.5 (23 Apr 2025 13:17), Max: 98.5 (22 Apr 2025 18:27)  HR: 56 (23 Apr 2025 16:19) (41 - 56)  BP: 136/67 (23 Apr 2025 16:19) (108/65 - 137/57)  BP(mean): --  RR: 18 (23 Apr 2025 13:17) (16 - 18)  SpO2: 97% (23 Apr 2025 13:17) (95% - 98%)    Parameters below as of 23 Apr 2025 13:17  Patient On (Oxygen Delivery Method): room air        LABS:                          13.5   4.39  )-----------( 121      ( 23 Apr 2025 07:37 )             42.7       04-23    140  |  104  |  26[H]  ----------------------------<  134[H]  4.4   |  24  |  0.94    Ca    9.3      23 Apr 2025 07:34  Phos  2.8     04-23  Mg     1.8     04-23    TPro  7.2  /  Alb  4.2  /  TBili  0.8  /  DBili  x   /  AST  18  /  ALT  14  /  AlkPhos  53  04-22      CAPILLARY BLOOD GLUCOSE      POCT Blood Glucose.: 151 mg/dL (23 Apr 2025 17:21)  POCT Blood Glucose.: 109 mg/dL (23 Apr 2025 13:01)  POCT Blood Glucose.: 153 mg/dL (23 Apr 2025 07:45)  POCT Blood Glucose.: 126 mg/dL (22 Apr 2025 21:15)      PT/INR - ( 22 Apr 2025 16:19 )   PT: 15.9 sec;   INR: 1.40 ratio         PTT - ( 22 Apr 2025 16:19 )  PTT:37.4 sec    LOWER EXTREMITY PHYSICAL EXAM:    Vascular: DP/PT 2/4, B/L, CFT <3 seconds B/L, Temperature gradient WNL, B/L, edema bilat lower extremity  Neuro: Epicritic sensation decreased to the level of foot, B/L.  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
CHIEF COMPLAINT:    HISTORY OF PRESENT ILLNESS:  74-year-old male with past medical history of A-fib on xarelto, CAD status post stents, dm,  who presents emergency department complaining of chest pain.  Patient states that he has started to have chest pain today.  Patient states he is also had substernal chest pressure for the last few days.  Complaining of mild shortness of breath.  Denies any exertional dyspnea.  Denies any fevers, chills, palpitations, headaches, dizziness, syncope.  Patient states he is compliant with his medications.  States he has not follow-up with a cardiologist in quite some time.  Denies any smoking or traveling history.    PAST MEDICAL & SURGICAL HISTORY:  HTN (hypertension)      HLD (hyperlipidemia)      Diabetes 1.5, managed as type 2      PVD (peripheral vascular disease)      Atrial fibrillation      CAD (coronary artery disease)      PAD (peripheral artery disease)      H/O varicose veins      Skin ulcer of left great toe      Skin ulcer of ankle      H/O cardiac catheterization      S/P peripheral artery angioplasty      Amputation of toe      History of prostate surgery              MEDICATIONS:  clopidogrel Tablet 75 milliGRAM(s) Oral daily  lisinopril 40 milliGRAM(s) Oral daily  rivaroxaban 20 milliGRAM(s) Oral with dinner            atorvastatin 40 milliGRAM(s) Oral at bedtime  dextrose 50% Injectable 25 Gram(s) IV Push once  dextrose 50% Injectable 12.5 Gram(s) IV Push once  dextrose 50% Injectable 25 Gram(s) IV Push once  dextrose Oral Gel 15 Gram(s) Oral once PRN  finasteride 5 milliGRAM(s) Oral daily  glucagon  Injectable 1 milliGRAM(s) IntraMuscular once  insulin lispro (ADMELOG) corrective regimen sliding scale   SubCutaneous three times a day before meals    brimonidine 0.2% Ophthalmic Solution 1 Drop(s) Both EYES two times a day  dextrose 5%. 1000 milliLiter(s) IV Continuous <Continuous>  dextrose 5%. 1000 milliLiter(s) IV Continuous <Continuous>  influenza  Vaccine (HIGH DOSE) 0.5 milliLiter(s) IntraMuscular once  latanoprost 0.005% Ophthalmic Solution 1 Drop(s) Both EYES at bedtime  tamsulosin 0.4 milliGRAM(s) Oral at bedtime      FAMILY HISTORY:  FH: congestive heart failure  In Mother        SOCIAL HISTORY:    [ ] Non-smoker  [ ] Smoker  [ ] Alcohol    Allergies    No Known Allergies    Intolerances    	    REVIEW OF SYSTEMS:  CONSTITUTIONAL: No fever, weight loss, or fatigue  EYES: No eye pain, visual disturbances, or discharge  ENMT:  No difficulty hearing, tinnitus, vertigo; No sinus or throat pain  NECK: No pain or stiffness  RESPIRATORY: No cough, wheezing, chills or hemoptysis; No Shortness of Breath  CARDIOVASCULAR: No chest pain, palpitations, passing out, dizziness, or leg swelling  GASTROINTESTINAL: No abdominal or epigastric pain. No nausea, vomiting, or hematemesis; No diarrhea or constipation. No melena or hematochezia.  GENITOURINARY: No dysuria, frequency, hematuria, or incontinence  NEUROLOGICAL: No headaches, memory loss, loss of strength, numbness, or tremors  SKIN: No itching, burning, rashes, or lesions   LYMPH Nodes: No enlarged glands  ENDOCRINE: No heat or cold intolerance; No hair loss  MUSCULOSKELETAL: No joint pain or swelling; No muscle, back, or extremity pain  PSYCHIATRIC: No depression, anxiety, mood swings, or difficulty sleeping  HEME/LYMPH: No easy bruising, or bleeding gums  ALLERY AND IMMUNOLOGIC: No hives or eczema	    [ ] All others negative	  [ ] Unable to obtain    PHYSICAL EXAM:  T(C): 36.6 (04-23-25 @ 04:38), Max: 36.9 (04-22-25 @ 18:27)  HR: 50 (04-23-25 @ 04:38) (41 - 56)  BP: 123/55 (04-23-25 @ 04:38) (104/66 - 123/60)  RR: 18 (04-23-25 @ 04:38) (16 - 18)  SpO2: 96% (04-23-25 @ 04:38) (95% - 98%)  Wt(kg): --  I&O's Summary    22 Apr 2025 07:01  -  23 Apr 2025 07:00  --------------------------------------------------------  IN: 0 mL / OUT: 0 mL / NET: 0 mL        Appearance: Normal	  HEENT:   Normal oral mucosa, PERRL, EOMI	  Lymphatic: No lymphadenopathy  Cardiovascular: Irregular S1 S2, No JVD, No murmurs, No edema  Respiratory: Lungs clear to auscultation	  Psychiatry: A & O x 3, Mood & affect appropriate  Gastrointestinal:  Soft, Non-tender, + BS	  Skin: No rashes, No ecchymoses, No cyanosis	  Neurologic: Non-focal  Extremities: Normal range of motion, No clubbing, cyanosis or edema  Vascular: Peripheral pulses palpable 2+ bilaterally    TELEMETRY: 	  AF  ECG:  	  RADIOLOGY:< from: Xray Chest 1 View- PORTABLE-Urgent (04.22.25 @ 16:13) >    ACC: [id number] EXAM:  XR CHEST PORTABLE URGENT 1V   ORDERED BY:  CHIRAG CARLTON     PROCEDURE DATE:  04/22/2025          INTERPRETATION:  HISTORY: Chest pain    TECHNIQUE: A single AP view of the chest was obtained.    COMPARISON: 4/29/2024    FINDINGS:  The cardiac silhouette is normal in size. The heart size   cannot be adequately assessed on this single view. There are no focal   consolidations or pleural effusions. The hilar and mediastinal structures   appear unremarkable. The osseousstructures are intact.    IMPRESSION: Clear lungs    --- End of Report ---    < end of copied text >    	  LABS:	 	    CARDIAC MARKERS:  Troponin T, High Sensitivity Result: 26: *     Pro-Brain Natriuretic Peptide: 481 pg/mL (04.22.25 @ 16:19)                             13.5   4.39  )-----------( 121      ( 23 Apr 2025 07:37 )             42.7     04-23    140  |  104  |  26[H]  ----------------------------<  134[H]  4.4   |  24  |  0.94    Ca    9.3      23 Apr 2025 07:34  Phos  2.8     04-23  Mg     1.8     04-23    TPro  7.2  /  Alb  4.2  /  TBili  0.8  /  DBili  x   /  AST  18  /  ALT  14  /  AlkPhos  53  04-22    proBNP:   Lipid Profile:   HgA1c:   TSH:

## 2025-04-23 NOTE — PROVIDER CONTACT NOTE (OTHER) - ACTION/TREATMENT ORDERED:
As per provider, monitoring continues.
DERIC Day notified. No new interventions ordered. Plan of care ongoing.

## 2025-04-23 NOTE — DISCHARGE NOTE PROVIDER - NSDCFUSCHEDAPPT_GEN_ALL_CORE_FT
Ajti Segura  [facility] Physician [facility]  [facility] [address]  Scheduled Appointment: 05/20/2025

## 2025-04-23 NOTE — DISCHARGE NOTE PROVIDER - NSDCCPCAREPLAN_GEN_ALL_CORE_FT
PRINCIPAL DISCHARGE DIAGNOSIS  Diagnosis: Acute chest pain  Assessment and Plan of Treatment:   You underwent myocardial perfusion stress testing, which demonstrated abnormal results including small-sized defects indicative of ischemia and fixed defects suggestive of diaphragmatic attenuation artifact. Your transthoracic echocardiogram showed no significant changes since the previous year, indicating stable cardiac structure and function including normal left ventricular systolic function with an ejection fraction of 68%, although moderate diastolic dysfunction and severe atrial dilations were noted. You are cleared for and advised to follow-up with your cardiologist, Dr. Wong, post-discharge.      SECONDARY DISCHARGE DIAGNOSES  Diagnosis: CAD (coronary artery disease)  Assessment and Plan of Treatment: Coronary artery disease is a condition in which the arteries that supply the heart muscle become clogged with fatty deposits, increasing your risk of a heart attack. If you experience any new pain, pressure, or discomfort in the center of your chest, pain, tingling, or discomfort in your arms, back, neck, jaw, or stomach, shortness of breath, nausea, vomiting, burping, heartburn, sweating, cold and clammy skin, or a racing or abnormal heartbeat lasting more than 10 minutes, or if these symptoms keep coming and going, call your doctor immediately. In an emergency, call 911 and do not attempt to drive yourself to the hospital.  Ensure you take your cardiac medications as prescribed to lower cholesterol, reduce blood pressure, prevent blood clots with aspirin, and control diabetes.  Finally, make sure to keep all appointments with your doctor for cardiac follow-up care.      Diagnosis: Bradycardia  Assessment and Plan of Treatment: Continue to hold on metoprolol due to bradycardia observed on telemetry.    Diagnosis: Atrial fibrillation  Assessment and Plan of Treatment: Continue Xarelto and continue to hold on metoprolol due to bradycardia observed on telemetry.    Diagnosis: Wound of foot  Assessment and Plan of Treatment:   You were also assessed by the podiatry department for a right heel ulcer and left foot Charcot deformity. The evaluation did not reveal any acute signs of infection. Recommendations included daily dressing changes and the potential for reconstructive surgery for his Charcot deformity. X-rays indicated various chronic changes, and a CT scan of lower extremity was suggested, which you opted to complete on an outpatient basis.  Please follow up with podiatrist after discharge.        PRINCIPAL DISCHARGE DIAGNOSIS  Diagnosis: Acute chest pain  Assessment and Plan of Treatment: You underwent myocardial perfusion stress testing, which demonstrated abnormal results including small-sized defects indicative of ischemia and fixed defects suggestive of diaphragmatic attenuation artifact. Your transthoracic echocardiogram showed no significant changes since the previous year, indicating stable cardiac structure and function including normal left ventricular systolic function with an ejection fraction of 68%, although moderate diastolic dysfunction and severe atrial dilations were noted. You are cleared for discharge and advised to follow-up with your cardiologist, Dr. Cabrera, post-discharge.      SECONDARY DISCHARGE DIAGNOSES  Diagnosis: CAD (coronary artery disease)  Assessment and Plan of Treatment: Coronary artery disease is a condition in which the arteries that supply the heart muscle become clogged with fatty deposits, increasing your risk of a heart attack. If you experience any new pain, pressure, or discomfort in the center of your chest, pain, tingling, or discomfort in your arms, back, neck, jaw, or stomach, shortness of breath, nausea, vomiting, burping, heartburn, sweating, cold and clammy skin, or a racing or abnormal heartbeat lasting more than 10 minutes, or if these symptoms keep coming and going, call your doctor immediately. In an emergency, call 911 and do not attempt to drive yourself to the hospital.  Ensure you take your cardiac medications as prescribed to lower cholesterol, reduce blood pressure, prevent blood clots with aspirin, and control diabetes.  Finally, make sure to keep all appointments with your doctor for cardiac follow-up care.      Diagnosis: Bradycardia  Assessment and Plan of Treatment: Continue to hold on metoprolol due to bradycardia observed on telemetry.    Diagnosis: Atrial fibrillation  Assessment and Plan of Treatment: Continue Xarelto and continue to hold on metoprolol due to bradycardia observed on telemetry.    Diagnosis: Wound of foot  Assessment and Plan of Treatment:   You were also assessed by the podiatry department for a right heel ulcer and left foot Charcot deformity. The evaluation did not reveal any acute signs of infection. Recommendations included daily dressing changes and the potential for reconstructive surgery for his Charcot deformity. X-rays indicated various chronic changes, and a CT scan of lower extremity was suggested, which you opted to complete on an outpatient basis.  Please follow up with podiatrist after discharge.

## 2025-04-23 NOTE — DISCHARGE NOTE PROVIDER - NSDCMRMEDTOKEN_GEN_ALL_CORE_FT
atorvastatin 40 mg oral tablet: 1 tab(s) orally once a day  brimonidine 0.2% ophthalmic solution: 1 drop(s) in each affected eye 2 times a day  clopidogrel 75 mg oral tablet: 1 tab(s) orally once a day  finasteride 5 mg oral tablet: 1 tab(s) orally once a day  latanoprost 0.005% ophthalmic solution: 1 drop(s) in each eye 2 times a day  metFORMIN 500 mg oral tablet: 1 tab(s) orally 2 times a day  metoprolol succinate 50 mg oral tablet, extended release: 1 tab(s) orally once a day  ramipril 10 mg oral capsule: 1 cap(s) orally 2 times a day  tamsulosin 0.4 mg oral capsule: 1 cap(s) orally once a day  Xarelto 20 mg oral tablet: 1 tab(s) orally once a day   atorvastatin 40 mg oral tablet: 1 tab(s) orally once a day  brimonidine 0.2% ophthalmic solution: 1 drop(s) in each affected eye 2 times a day  clopidogrel 75 mg oral tablet: 1 tab(s) orally once a day  finasteride 5 mg oral tablet: 1 tab(s) orally once a day  latanoprost 0.005% ophthalmic solution: 1 drop(s) in each eye 2 times a day  metFORMIN 500 mg oral tablet: 1 tab(s) orally 2 times a day  ramipril 10 mg oral capsule: 1 cap(s) orally 2 times a day  tamsulosin 0.4 mg oral capsule: 1 cap(s) orally once a day  Xarelto 20 mg oral tablet: 1 tab(s) orally once a day

## 2025-04-23 NOTE — CONSULT NOTE ADULT - ASSESSMENT
75 y/o male pt with right heel ulcer, left foot charcot deformity  - pt seen and evaluated  - right heel ulcer with no acute signs of infection  - rec daily dressing changes with aquacell and DSD and bilat lower extremity ACE compression  - discussed potential for left foot charcot deformity reconstruction  - xrays ordered as well as CT scan for deformity correction planning  - rec z flow boots in bed at all times  - will follow 
74-year-old male with past medical history of A-fib on xarelto, CAD status post stents, dm,  who presents emergency department complaining of chest pain.  Patient states that he has started to have chest pain today.  Patient states he is also had substernal chest pressure for the last few days.  Complaining of mild shortness of breath.  Denies any exertional dyspnea.  Denies any fevers, chills, palpitations, headaches, dizziness, syncope.  Patient states he is compliant with his medications.  States he has not follow-up with a cardiologist in quite some time.  Denies any smoking or traveling history.

## 2025-04-23 NOTE — PROVIDER CONTACT NOTE (OTHER) - ASSESSMENT
Pt AO X4, bradycardic on tele monitor. As per patient and notes, he is bradycardic at baseline. Asymptomatic.
Pt A&Ox4, able to make needs known. Pt asymptomatic. VSS. Pt afib nereida on tele HR 52. No s/s of bleeding. Pt denies cp, denies SOB, denies pain. Pt states he has had pauses previously and his cardiologist is aware.

## 2025-04-23 NOTE — DISCHARGE NOTE PROVIDER - CARE PROVIDER_API CALL
Luigi Cabrera  Interventional Cardiology  [address] [place] [id number]  Phone: [phone]  Fax: [phone]  Follow Up Time: 1 week    Laitsha Zuleta  Vascular/Intervent Radiology  [address] [place] [id number]  Phone: [phone]  Fax: [phone]  Follow Up Time: 1 week

## 2025-04-23 NOTE — CONSULT NOTE ADULT - PROBLEM SELECTOR RECOMMENDATION 9
Hx of prior stents  Chest pain remains  Trops neg  Check TTE  Check stress test when CP resolves   Monitor on tele

## 2025-04-23 NOTE — DISCHARGE NOTE PROVIDER - NSDCFUADDAPPT_GEN_ALL_CORE_FT
Podiatry Discharge Instructions:  Follow up: Please follow up with Dr. Portillo within 1 week of discharge from the hospital, please call [phone] for appointment and discuss that you recently were seen in the hospital.  Wound Care: Please cleanse R posterior heel wound with saline, apply aquacel, 4x4 gauze, pedro and ACE bandage to level of below knee. Please apply ACE bandage from digits to below knee on L.  Weight bearing: Please weight bear as tolerated in a surgical shoe to R and comfortable shoe gear to the L.  Antibiotics: Please continue as instructed.

## 2025-04-23 NOTE — CHART NOTE - NSCHARTNOTEFT_GEN_A_CORE
Bradycardia  Patient asymptomatic. States HR typically in the 50s, and occasional goes as low as 40s.   CTM on telemetry.   Will hold AM Toprol dose. MEDICINE [facility]  74y Male    Patient is a 74y old  Male who presents with a chief complaint of chest pain.     Notified by RN, Patient with bradycardia, HR in low 40s, with brief dip to 38 while asleep; asymptomatic, VS otherwise stable. Patient seen at bedside, A&Ox3, in NAD. Patient endorses a history of bradycardia, stating his HR is typically in the 50s, and occasional goes into the 40s. Patient denies chest pain, palpitations, SOB, dizziness. Per flowsheet review, patient's HR has been in 50s since admission. VS at time of assessment: HR 41, /65, RR 16, oxygen saturation 95% on RA.     -Vital Signs Last 24 Hrs  T(C): 36.6 (23 Apr 2025 04:38), Max: 36.9 (22 Apr 2025 18:27)  T(F): 97.9 (23 Apr 2025 04:38), Max: 98.5 (22 Apr 2025 18:27)  HR: 50 (23 Apr 2025 04:38) (41 - 56)  BP: 123/55 (23 Apr 2025 04:38) (104/66 - 123/60)  BP(mean): 80 (22 Apr 2025 17:02) (80 - 80)  RR: 18 (23 Apr 2025 04:38) (16 - 18)  SpO2: 96% (23 Apr 2025 04:38) (95% - 98%)    Parameters below as of 23 Apr 2025 04:38  Patient On (Oxygen Delivery Method): room air    > Patient hemodynamically stable, except for bradycardia  > Bradycardia down to 38, likely vagally mediated iso patient sleeping  > EKG from afternoon time (4/22/25 14:35PM) showed atrial fibrillation w/ slow ventricular response, HR 52bpm, RBBB  > Telemetry reviewed showing afib with SVR, HR 38-40s  > F/u AM electrolytes and replete PRN to keep K >4.0, Mg >2.0, Phos >3.0  > Hold Toprol iso bradycardia  > Keep R2 pads at bedside  > Will obtain STAT EKG/call RRT if HR sustains lower, if patient becomes symptomatic, or becomes hemodynamically unstable.   > F/u with cardiology in AM  > Continue with telemetry monitoring  > Will continue to monitor  > Will endorse to day ACP; attending to follow     Ruth Mendiola PA-C  Medicine Department  [id number]

## 2025-04-23 NOTE — DISCHARGE NOTE PROVIDER - HOSPITAL COURSE
HPI:  74-year-old male with a history of coronary artery disease post-percutaneous coronary intervention, atrial fibrillation on Xarelto, diabetes mellitus, and other comorbidities presented with chest pain.    Hospital Course:  The patient underwent myocardial perfusion stress testing, which demonstrated abnormal results including small-sized defects indicative of ischemia and fixed defects suggestive of diaphragmatic attenuation artifact. His transthoracic echocardiogram showed no significant changes since the previous year, indicating stable cardiac structure and function including normal left ventricular systolic function with an ejection fraction of 68%, although moderate diastolic dysfunction and severe atrial dilations were noted. The cardiology team, after a thorough review, cleared him for discharge and advised follow-up with his cardiologist, Dr. Wong, post-discharge.    In addition to his cardiac condition, he was also assessed by the podiatry department for a right heel ulcer and left foot Charcot deformity. The evaluation did not reveal any acute signs of infection. Recommendations included daily dressing changes and the potential for reconstructive surgery for his Charcot deformity. X-rays indicated various chronic changes, and a CT scan of lower extremity was suggested, which the patient opted to complete on an outpatient basis.    Continuing management for his other conditions included the continuation of Plavix and statin for his coronary artery disease, ongoing Xarelto for atrial fibrillation with hold on Metoprolol due to bradycardia observed on telemetry. The multidisciplinary approach provided comprehensive care addressing both his acute symptoms and chronic management, with arrangements for detailed outpatient follow-up and further diagnostic assessment.    Important Medication Changes and Reason: hold metoprolol     Active or Pending Issues Requiring Follow-up: PCP, cardiology, podiatry    Advanced Directives:   [x] Full code  [ ] DNR  [ ] Hospice    Discharge Diagnoses:  chest pain  bilateral foot wounds  CAD  AF  bradycardia         HPI:  74-year-old male with a history of coronary artery disease post-percutaneous coronary intervention, atrial fibrillation on Xarelto, diabetes mellitus, and other comorbidities presented with chest pain.    Hospital Course:  The patient underwent myocardial perfusion stress testing, which demonstrated abnormal results including small-sized defects indicative of ischemia and fixed defects suggestive of diaphragmatic attenuation artifact. His transthoracic echocardiogram showed no significant changes since the previous year, indicating stable cardiac structure and function including normal left ventricular systolic function with an ejection fraction of 68%, although moderate diastolic dysfunction and severe atrial dilations were noted. The cardiology team, after a thorough review, cleared him for discharge and advised follow-up with his cardiologist, Dr. Cabrera, post-discharge.    In addition to his cardiac condition, he was also assessed by the podiatry department for a right heel ulcer and left foot Charcot deformity. The evaluation did not reveal any acute signs of infection. Recommendations included daily dressing changes and the potential for reconstructive surgery for his Charcot deformity. X-rays indicated various chronic changes, and a CT scan of lower extremity was suggested, which the patient opted to complete on an outpatient basis.    Continuing management for his other conditions included the continuation of Plavix and statin for his coronary artery disease, ongoing Xarelto for atrial fibrillation with hold on Metoprolol due to bradycardia observed on telemetry. The multidisciplinary approach provided comprehensive care addressing both his acute symptoms and chronic management, with arrangements for detailed outpatient follow-up and further diagnostic assessment.    Important Medication Changes and Reason: hold metoprolol     Active or Pending Issues Requiring Follow-up: PCP, cardiology, podiatry    Advanced Directives:   [x] Full code  [ ] DNR  [ ] Hospice    Discharge Diagnoses:  chest pain  bilateral foot wounds  CAD  AF  bradycardia

## 2025-04-23 NOTE — PROGRESS NOTE ADULT - ASSESSMENT
74 male h/o cad s/p pci, afib on xarelto, dm, here with chest pain     chest pain  acs ruled out with trop neg x2  ekg w/o acute changes   cards consulted  check TTE  check stress - on hold for now given c/o chest pain. possible gerd. trial of maalox.     cad s/p pci  cont plavix  statin    afib  ac with xarelto  rate control    pod eval for foot wounds     cont other home meds    Advanced care planning was discussed with patient and family.  Advanced care planning forms were reviewed and discussed as appropriate.  Differential diagnosis and plan of care discussed with patient after the evaluation.   Pain assessed and judicious use of narcotics when appropriate was discussed.  Importance of Fall prevention discussed.  Counseling on Smoking and Alcohol cessation was offered when appropriate.  Counseling on Diet, exercise, and medication compliance was done.          74 male h/o cad s/p pci, afib on xarelto, dm, here with chest pain     chest pain  acs ruled out with trop neg x2  ekg w/o acute changes   cards consulted  check TTE  check stress - on hold for now given c/o chest pain. possible gerd. trial of maalox.     cad s/p pci  cont plavix  statin    afib  ac with xarelto  bradycardia on tele. holding bb  cont tele monitor   cards f/u     pod eval for foot wounds     cont other home meds    d/w cards     Advanced care planning was discussed with patient and family.  Advanced care planning forms were reviewed and discussed as appropriate.  Differential diagnosis and plan of care discussed with patient after the evaluation.   Pain assessed and judicious use of narcotics when appropriate was discussed.  Importance of Fall prevention discussed.  Counseling on Smoking and Alcohol cessation was offered when appropriate.  Counseling on Diet, exercise, and medication compliance was done.

## 2025-04-23 NOTE — DISCHARGE NOTE PROVIDER - CARE PROVIDERS DIRECT ADDRESSES
[email or web address]

## 2025-04-23 NOTE — PROVIDER CONTACT NOTE (OTHER) - REASON
Patient bradycardic while sleeping to low 40s and dips into 38, 39.
Pt had 2.83 sec pause on tele, asymptomatic

## 2025-04-23 NOTE — DISCHARGE NOTE PROVIDER - PROVIDER TOKENS
PROVIDER:[TOKEN:[[id number]],FOLLOWUP:[1 week]],PROVIDER:[TOKEN:[[id number]],FOLLOWUP:[1 week]]

## 2025-04-24 ENCOUNTER — RESULT REVIEW (OUTPATIENT)
Age: 75
End: 2025-04-24

## 2025-04-24 ENCOUNTER — TRANSCRIPTION ENCOUNTER (OUTPATIENT)
Age: 75
End: 2025-04-24

## 2025-04-24 VITALS
RESPIRATION RATE: 18 BRPM | OXYGEN SATURATION: 96 % | DIASTOLIC BLOOD PRESSURE: 65 MMHG | SYSTOLIC BLOOD PRESSURE: 143 MMHG | HEART RATE: 56 BPM

## 2025-04-24 LAB
ANION GAP SERPL CALC-SCNC: 9 MMOL/L — SIGNIFICANT CHANGE UP (ref 5–17)
BUN SERPL-MCNC: 30 MG/DL — HIGH (ref 7–23)
CALCIUM SERPL-MCNC: 9.1 MG/DL — SIGNIFICANT CHANGE UP (ref 8.4–10.5)
CHLORIDE SERPL-SCNC: 106 MMOL/L — SIGNIFICANT CHANGE UP (ref 96–108)
CO2 SERPL-SCNC: 27 MMOL/L — SIGNIFICANT CHANGE UP (ref 22–31)
CREAT SERPL-MCNC: 1.11 MG/DL — SIGNIFICANT CHANGE UP (ref 0.5–1.3)
EGFR: 70 ML/MIN/1.73M2 — SIGNIFICANT CHANGE UP
EGFR: 70 ML/MIN/1.73M2 — SIGNIFICANT CHANGE UP
GLUCOSE BLDC GLUCOMTR-MCNC: 108 MG/DL — HIGH (ref 70–99)
GLUCOSE BLDC GLUCOMTR-MCNC: 120 MG/DL — HIGH (ref 70–99)
GLUCOSE BLDC GLUCOMTR-MCNC: 129 MG/DL — HIGH (ref 70–99)
GLUCOSE SERPL-MCNC: 138 MG/DL — HIGH (ref 70–99)
HCT VFR BLD CALC: 40.2 % — SIGNIFICANT CHANGE UP (ref 39–50)
HGB BLD-MCNC: 12.7 G/DL — LOW (ref 13–17)
MCHC RBC-ENTMCNC: 30.5 PG — SIGNIFICANT CHANGE UP (ref 27–34)
MCHC RBC-ENTMCNC: 31.6 G/DL — LOW (ref 32–36)
MCV RBC AUTO: 96.4 FL — SIGNIFICANT CHANGE UP (ref 80–100)
NRBC BLD AUTO-RTO: 0 /100 WBCS — SIGNIFICANT CHANGE UP (ref 0–0)
PLATELET # BLD AUTO: 114 K/UL — LOW (ref 150–400)
POTASSIUM SERPL-MCNC: 4.3 MMOL/L — SIGNIFICANT CHANGE UP (ref 3.5–5.3)
POTASSIUM SERPL-SCNC: 4.3 MMOL/L — SIGNIFICANT CHANGE UP (ref 3.5–5.3)
RBC # BLD: 4.17 M/UL — LOW (ref 4.2–5.8)
RBC # FLD: 14.8 % — HIGH (ref 10.3–14.5)
SODIUM SERPL-SCNC: 142 MMOL/L — SIGNIFICANT CHANGE UP (ref 135–145)
TROPONIN T, HIGH SENSITIVITY RESULT: 27 NG/L — SIGNIFICANT CHANGE UP (ref 0–51)
WBC # BLD: 5.32 K/UL — SIGNIFICANT CHANGE UP (ref 3.8–10.5)
WBC # FLD AUTO: 5.32 K/UL — SIGNIFICANT CHANGE UP (ref 3.8–10.5)

## 2025-04-24 PROCEDURE — 83880 ASSAY OF NATRIURETIC PEPTIDE: CPT

## 2025-04-24 PROCEDURE — 85730 THROMBOPLASTIN TIME PARTIAL: CPT

## 2025-04-24 PROCEDURE — 93306 TTE W/DOPPLER COMPLETE: CPT

## 2025-04-24 PROCEDURE — 84100 ASSAY OF PHOSPHORUS: CPT

## 2025-04-24 PROCEDURE — 85027 COMPLETE CBC AUTOMATED: CPT

## 2025-04-24 PROCEDURE — 93016 CV STRESS TEST SUPVJ ONLY: CPT

## 2025-04-24 PROCEDURE — A9500: CPT

## 2025-04-24 PROCEDURE — 73630 X-RAY EXAM OF FOOT: CPT | Mod: 26,50

## 2025-04-24 PROCEDURE — 93018 CV STRESS TEST I&R ONLY: CPT

## 2025-04-24 PROCEDURE — 93005 ELECTROCARDIOGRAM TRACING: CPT

## 2025-04-24 PROCEDURE — 73630 X-RAY EXAM OF FOOT: CPT

## 2025-04-24 PROCEDURE — 36415 COLL VENOUS BLD VENIPUNCTURE: CPT

## 2025-04-24 PROCEDURE — 99285 EMERGENCY DEPT VISIT HI MDM: CPT

## 2025-04-24 PROCEDURE — 85610 PROTHROMBIN TIME: CPT

## 2025-04-24 PROCEDURE — 83036 HEMOGLOBIN GLYCOSYLATED A1C: CPT

## 2025-04-24 PROCEDURE — 82962 GLUCOSE BLOOD TEST: CPT

## 2025-04-24 PROCEDURE — 84484 ASSAY OF TROPONIN QUANT: CPT

## 2025-04-24 PROCEDURE — 71045 X-RAY EXAM CHEST 1 VIEW: CPT

## 2025-04-24 PROCEDURE — 78452 HT MUSCLE IMAGE SPECT MULT: CPT | Mod: 26

## 2025-04-24 PROCEDURE — 85025 COMPLETE CBC W/AUTO DIFF WBC: CPT

## 2025-04-24 PROCEDURE — 83735 ASSAY OF MAGNESIUM: CPT

## 2025-04-24 PROCEDURE — 80053 COMPREHEN METABOLIC PANEL: CPT

## 2025-04-24 PROCEDURE — 93017 CV STRESS TEST TRACING ONLY: CPT

## 2025-04-24 PROCEDURE — 78452 HT MUSCLE IMAGE SPECT MULT: CPT | Mod: MC

## 2025-04-24 PROCEDURE — 80048 BASIC METABOLIC PNL TOTAL CA: CPT

## 2025-04-24 RX ORDER — METOPROLOL SUCCINATE 50 MG/1
1 TABLET, EXTENDED RELEASE ORAL
Refills: 0 | DISCHARGE

## 2025-04-24 RX ADMIN — BRIMONIDINE TARTRATE 1 DROP(S): 1.5 SOLUTION/ DROPS OPHTHALMIC at 17:20

## 2025-04-24 RX ADMIN — LISINOPRIL 40 MILLIGRAM(S): 5 TABLET ORAL at 05:10

## 2025-04-24 RX ADMIN — FINASTERIDE 5 MILLIGRAM(S): 1 TABLET, FILM COATED ORAL at 13:47

## 2025-04-24 RX ADMIN — CLOPIDOGREL BISULFATE 75 MILLIGRAM(S): 75 TABLET, FILM COATED ORAL at 13:47

## 2025-04-24 RX ADMIN — BRIMONIDINE TARTRATE 1 DROP(S): 1.5 SOLUTION/ DROPS OPHTHALMIC at 05:11

## 2025-04-24 NOTE — PROGRESS NOTE ADULT - ASSESSMENT
75 y/o male pt with right heel ulcer, left foot charcot deformity  - pt seen and evaluated  - Afebrile, no leukocytosis  - right heel ulcer with no acute signs of infection  - B/L feet xray: no gas, no OM, charcot deformity  - Left lower extremity CT scan: pending  - no culture obtained 2/2 no acute signs of infection  - rec daily dressing changes with aquacell and DSD and bilat lower extremity ACE compression  - discussed potential for left foot charcot deformity reconstruction  - rec z flow boots in bed at all times  - Discussed with attending

## 2025-04-24 NOTE — PROGRESS NOTE ADULT - SUBJECTIVE AND OBJECTIVE BOX
Patient is a 74y old  Male who presents with a chief complaint of      INTERVAL HPI/OVERNIGHT EVENTS:  Patient seen and evaluated at bedside.  Pt is resting comfortable in NAD. Denies N/V/F/C.    Allergies    No Known Allergies    Intolerances        Vital Signs Last 24 Hrs  T(C): 36.6 (24 Apr 2025 13:50), Max: 36.6 (23 Apr 2025 20:46)  T(F): 97.9 (24 Apr 2025 13:50), Max: 97.9 (23 Apr 2025 20:46)  HR: 56 (24 Apr 2025 16:29) (49 - 56)  BP: 143/65 (24 Apr 2025 16:29) (108/56 - 143/65)  BP(mean): --  RR: 18 (24 Apr 2025 16:29) (18 - 18)  SpO2: 96% (24 Apr 2025 16:29) (96% - 98%)    Parameters below as of 24 Apr 2025 16:29  Patient On (Oxygen Delivery Method): room air        LABS:                        12.7   5.32  )-----------( 114      ( 24 Apr 2025 06:25 )             40.2     04-24    142  |  106  |  30[H]  ----------------------------<  138[H]  4.3   |  27  |  1.11    Ca    9.1      24 Apr 2025 06:24  Phos  2.8     04-23  Mg     1.8     04-23        Urinalysis Basic - ( 24 Apr 2025 06:24 )    Color: x / Appearance: x / SG: x / pH: x  Gluc: 138 mg/dL / Ketone: x  / Bili: x / Urobili: x   Blood: x / Protein: x / Nitrite: x   Leuk Esterase: x / RBC: x / WBC x   Sq Epi: x / Non Sq Epi: x / Bacteria: x      CAPILLARY BLOOD GLUCOSE      POCT Blood Glucose.: 108 mg/dL (24 Apr 2025 17:01)  POCT Blood Glucose.: 120 mg/dL (24 Apr 2025 13:45)  POCT Blood Glucose.: 129 mg/dL (24 Apr 2025 07:36)  POCT Blood Glucose.: 121 mg/dL (23 Apr 2025 21:19)      Lower Extremity Physical Exam:  Vascular: DP/PT 2/4, B/L, CFT <3 seconds B/L, Temperature gradient WNL, B/L, edema bilat lower extremity  Neuro: Epicritic sensation decreased to the level of foot, B/L.  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  
QUINCY MELLO  74y Male  MRN:[id number]    Patient is a 74y old  Male who presents with a chief complaint of chest pain     HPI:   74-year-old male with past medical history of A-fib on xarelto, CAD status post stents, dm,  who presents emergency department complaining of chest pain.  Patient states that he has started to have chest pain today.  Patient states he is also had substernal chest pressure for the last few days.  Complaining of mild shortness of breath.  Denies any exertional dyspnea.  Denies any fevers, chills, palpitations, headaches, dizziness, syncope.  Patient states he is compliant with his medications.  States he has not follow-up with a cardiologist in quite some time.  Denies any smoking or traveling history. (22 Apr 2025 20:46)      Patient seen and evaluated at bedside. No acute events overnight except as noted.    Interval HPI: nereida o/n    PAST MEDICAL & SURGICAL HISTORY:  HTN (hypertension)      HLD (hyperlipidemia)      Diabetes 1.5, managed as type 2      PVD (peripheral vascular disease)      Atrial fibrillation      CAD (coronary artery disease)      PAD (peripheral artery disease)      H/O varicose veins      Skin ulcer of left great toe      Skin ulcer of ankle      H/O cardiac catheterization      S/P peripheral artery angioplasty      Amputation of toe      History of prostate surgery          REVIEW OF SYSTEMS:  as per hpi       VITALS:  Vital Signs Last 24 Hrs  T(C): 36.6 (23 Apr 2025 04:38), Max: 36.9 (22 Apr 2025 18:27)  T(F): 97.9 (23 Apr 2025 04:38), Max: 98.5 (22 Apr 2025 18:27)  HR: 50 (23 Apr 2025 04:38) (41 - 56)  BP: 123/55 (23 Apr 2025 04:38) (104/66 - 123/60)  BP(mean): 80 (22 Apr 2025 17:02) (80 - 80)  RR: 18 (23 Apr 2025 04:38) (16 - 18)  SpO2: 96% (23 Apr 2025 04:38) (95% - 98%)    Parameters below as of 23 Apr 2025 04:38  Patient On (Oxygen Delivery Method): room air      CAPILLARY BLOOD GLUCOSE      POCT Blood Glucose.: 153 mg/dL (23 Apr 2025 07:45)  POCT Blood Glucose.: 126 mg/dL (22 Apr 2025 21:15)    I&O's Summary    22 Apr 2025 07:01  -  23 Apr 2025 07:00  --------------------------------------------------------  IN: 0 mL / OUT: 0 mL / NET: 0 mL        PHYSICAL EXAM:  GENERAL: NAD, well-developed  HEAD:  Atraumatic, Normocephalic  EYES: EOMI, PERRLA, conjunctiva and sclera clear  NECK: Supple, No JVD  CHEST/LUNG: Clear to auscultation bilaterally; No wheeze  HEART: S1, S2; No murmurs, rubs, or gallops  ABDOMEN: Soft, Nontender, Nondistended; Bowel sounds present  EXTREMITIES:  2+ Peripheral Pulses, No clubbing, cyanosis, or edema  PSYCH: Normal affect  NEUROLOGY: AAOX3; non-focal  SKIN: No rashes or lesions    Consultant(s) Notes Reviewed:  [x ] YES  [ ] NO  Care Discussed with Consultants/Other Providers [ x] YES  [ ] NO    MEDS:  MEDICATIONS  (STANDING):  atorvastatin 40 milliGRAM(s) Oral at bedtime  brimonidine 0.2% Ophthalmic Solution 1 Drop(s) Both EYES two times a day  clopidogrel Tablet 75 milliGRAM(s) Oral daily  dextrose 5%. 1000 milliLiter(s) (50 mL/Hr) IV Continuous <Continuous>  dextrose 5%. 1000 milliLiter(s) (100 mL/Hr) IV Continuous <Continuous>  dextrose 50% Injectable 25 Gram(s) IV Push once  dextrose 50% Injectable 12.5 Gram(s) IV Push once  dextrose 50% Injectable 25 Gram(s) IV Push once  finasteride 5 milliGRAM(s) Oral daily  glucagon  Injectable 1 milliGRAM(s) IntraMuscular once  influenza  Vaccine (HIGH DOSE) 0.5 milliLiter(s) IntraMuscular once  insulin lispro (ADMELOG) corrective regimen sliding scale   SubCutaneous three times a day before meals  latanoprost 0.005% Ophthalmic Solution 1 Drop(s) Both EYES at bedtime  lisinopril 40 milliGRAM(s) Oral daily  rivaroxaban 20 milliGRAM(s) Oral with dinner  tamsulosin 0.4 milliGRAM(s) Oral at bedtime    MEDICATIONS  (PRN):  dextrose Oral Gel 15 Gram(s) Oral once PRN Blood Glucose LESS THAN 70 milliGRAM(s)/deciliter    ALLERGIES:  No Known Allergies      LABS:                        13.5   4.39  )-----------( 121      ( 23 Apr 2025 07:37 )             42.7     04-23    140  |  104  |  26[H]  ----------------------------<  134[H]  4.4   |  24  |  0.94    Ca    9.3      23 Apr 2025 07:34  Phos  2.8     04-23  Mg     1.8     04-23    TPro  7.2  /  Alb  4.2  /  TBili  0.8  /  DBili  x   /  AST  18  /  ALT  14  /  AlkPhos  53  04-22    PT/INR - ( 22 Apr 2025 16:19 )   PT: 15.9 sec;   INR: 1.40 ratio         PTT - ( 22 Apr 2025 16:19 )  PTT:37.4 sec      LIVER FUNCTIONS - ( 22 Apr 2025 16:19 )  Alb: 4.2 g/dL / Pro: 7.2 g/dL / ALK PHOS: 53 U/L / ALT: 14 U/L / AST: 18 U/L / GGT: x           Urinalysis Basic - ( 23 Apr 2025 07:34 )    Color: x / Appearance: x / SG: x / pH: x  Gluc: 134 mg/dL / Ketone: x  / Bili: x / Urobili: x   Blood: x / Protein: x / Nitrite: x   Leuk Esterase: x / RBC: x / WBC x   Sq Epi: x / Non Sq Epi: x / Bacteria: x     < from: Xray Chest 1 View- PORTABLE-Urgent (04.22.25 @ 16:13) >    IMPRESSION: Clear lungs    --- End of Report ---    < end of copied text >  
QUINCY MELLO  74y Male  MRN:[id number]    Patient is a 74y old  Male who presents with a chief complaint of chest pain     HPI:   74-year-old male with past medical history of A-fib on xarelto, CAD status post stents, dm,  who presents emergency department complaining of chest pain.  Patient states that he has started to have chest pain today.  Patient states he is also had substernal chest pressure for the last few days.  Complaining of mild shortness of breath.  Denies any exertional dyspnea.  Denies any fevers, chills, palpitations, headaches, dizziness, syncope.  Patient states he is compliant with his medications.  States he has not follow-up with a cardiologist in quite some time.  Denies any smoking or traveling history. (22 Apr 2025 20:46)      Patient seen and evaluated at bedside. No acute events overnight except as noted.    Interval HPI: nereida o/n    PAST MEDICAL & SURGICAL HISTORY:  HTN (hypertension)      HLD (hyperlipidemia)      Diabetes 1.5, managed as type 2      PVD (peripheral vascular disease)      Atrial fibrillation      CAD (coronary artery disease)      PAD (peripheral artery disease)      H/O varicose veins      Skin ulcer of left great toe      Skin ulcer of ankle      H/O cardiac catheterization      S/P peripheral artery angioplasty      Amputation of toe      History of prostate surgery          REVIEW OF SYSTEMS:  as per hpi       VITALS:   Vital Signs Last 24 Hrs  T(C): 36.4 (24 Apr 2025 05:00), Max: 36.6 (23 Apr 2025 20:46)  T(F): 97.5 (24 Apr 2025 05:00), Max: 97.9 (23 Apr 2025 20:46)  HR: 50 (24 Apr 2025 08:43) (49 - 56)  BP: 137/65 (24 Apr 2025 08:43) (108/56 - 137/65)  BP(mean): --  RR: 18 (24 Apr 2025 08:43) (18 - 18)  SpO2: 98% (24 Apr 2025 08:43) (97% - 98%)    Parameters below as of 24 Apr 2025 08:43  Patient On (Oxygen Delivery Method): room air          PHYSICAL EXAM:  GENERAL: NAD, well-developed  HEAD:  Atraumatic, Normocephalic  EYES: EOMI, PERRLA, conjunctiva and sclera clear  NECK: Supple, No JVD  CHEST/LUNG: Clear to auscultation bilaterally; No wheeze  HEART: S1, S2; No murmurs, rubs, or gallops  ABDOMEN: Soft, Nontender, Nondistended; Bowel sounds present  EXTREMITIES:  2+ Peripheral Pulses, No clubbing, cyanosis, or edema  PSYCH: Normal affect  NEUROLOGY: AAOX3; non-focal  SKIN: No rashes or lesions    Consultant(s) Notes Reviewed:  [x ] YES  [ ] NO  Care Discussed with Consultants/Other Providers [ x] YES  [ ] NO    MEDS:   MEDICATIONS  (STANDING):  atorvastatin 40 milliGRAM(s) Oral at bedtime  brimonidine 0.2% Ophthalmic Solution 1 Drop(s) Both EYES two times a day  clopidogrel Tablet 75 milliGRAM(s) Oral daily  dextrose 5%. 1000 milliLiter(s) (50 mL/Hr) IV Continuous <Continuous>  dextrose 5%. 1000 milliLiter(s) (100 mL/Hr) IV Continuous <Continuous>  dextrose 50% Injectable 25 Gram(s) IV Push once  dextrose 50% Injectable 12.5 Gram(s) IV Push once  dextrose 50% Injectable 25 Gram(s) IV Push once  finasteride 5 milliGRAM(s) Oral daily  glucagon  Injectable 1 milliGRAM(s) IntraMuscular once  influenza  Vaccine (HIGH DOSE) 0.5 milliLiter(s) IntraMuscular once  insulin lispro (ADMELOG) corrective regimen sliding scale   SubCutaneous three times a day before meals  latanoprost 0.005% Ophthalmic Solution 1 Drop(s) Both EYES at bedtime  lisinopril 40 milliGRAM(s) Oral daily  tamsulosin 0.4 milliGRAM(s) Oral at bedtime    MEDICATIONS  (PRN):  dextrose Oral Gel 15 Gram(s) Oral once PRN Blood Glucose LESS THAN 70 milliGRAM(s)/deciliter    ALLERGIES:  No Known Allergies      LABS:                                     12.7   5.32  )-----------( 114      ( 24 Apr 2025 06:25 )             40.2   04-24    142  |  106  |  30[H]  ----------------------------<  138[H]  4.3   |  27  |  1.11    Ca    9.1      24 Apr 2025 06:24  Phos  2.8     04-23  Mg     1.8     04-23    TPro  7.2  /  Alb  4.2  /  TBili  0.8  /  DBili  x   /  AST  18  /  ALT  14  /  AlkPhos  53  04-22       < from: Xray Chest 1 View- PORTABLE-Urgent (04.22.25 @ 16:13) >    IMPRESSION: Clear lungs    --- End of Report ---    < end of copied text >  < from: TTE W or WO Ultrasound Enhancing Agent (04.23.25 @ 11:01) >     CONCLUSIONS:      1. Left ventricular cavity is mildly dilated. Left ventricular wall thickness is normal. Left ventricular systolic function is normal with an ejection fraction of 68 % by Faye's method of disks. There are no regional wall motion abnormalities seen.   2. There is moderate (grade 2) left ventricular diastolicdysfunction.   3. Mildly enlarged right ventricular cavity size and normal right ventricular systolic function.   4. Left atrium is severely dilated.   5. The right atrium is severely dilated.   6. Trileaflet aortic valve. Fibrocalcific aortic valve sclerosis without stenosis.   7. Moderate aortic regurgitation.   8. Mild to moderate left ventricular hypertrophy.   9. No pericardial effusion seen.  10. Compared to the transthoracic echocardiogram performed on 4/26/2024, there have been no significant interval changes.    < end of copied text >  
Subjective: Patient seen and examined. No new events except as noted.     REVIEW OF SYSTEMS:    CONSTITUTIONAL: No weakness, fevers or chills  EYES/ENT: No visual changes;  No vertigo or throat pain   NECK: No pain or stiffness  RESPIRATORY: No cough, wheezing, hemoptysis; No shortness of breath  CARDIOVASCULAR: No chest pain or palpitations  GASTROINTESTINAL: No abdominal or epigastric pain. No nausea, vomiting, or hematemesis; No diarrhea or constipation. No melena or hematochezia.  GENITOURINARY: No dysuria, frequency or hematuria  NEUROLOGICAL: No numbness or weakness  SKIN: No itching, burning, rashes, or lesions   All other review of systems is negative unless indicated above.    MEDICATIONS:  MEDICATIONS  (STANDING):  atorvastatin 40 milliGRAM(s) Oral at bedtime  brimonidine 0.2% Ophthalmic Solution 1 Drop(s) Both EYES two times a day  clopidogrel Tablet 75 milliGRAM(s) Oral daily  dextrose 5%. 1000 milliLiter(s) (50 mL/Hr) IV Continuous <Continuous>  dextrose 5%. 1000 milliLiter(s) (100 mL/Hr) IV Continuous <Continuous>  dextrose 50% Injectable 25 Gram(s) IV Push once  dextrose 50% Injectable 12.5 Gram(s) IV Push once  dextrose 50% Injectable 25 Gram(s) IV Push once  finasteride 5 milliGRAM(s) Oral daily  glucagon  Injectable 1 milliGRAM(s) IntraMuscular once  influenza  Vaccine (HIGH DOSE) 0.5 milliLiter(s) IntraMuscular once  insulin lispro (ADMELOG) corrective regimen sliding scale   SubCutaneous three times a day before meals  latanoprost 0.005% Ophthalmic Solution 1 Drop(s) Both EYES at bedtime  lisinopril 40 milliGRAM(s) Oral daily  tamsulosin 0.4 milliGRAM(s) Oral at bedtime      PHYSICAL EXAM:  T(C): 36.4 (04-24-25 @ 05:00), Max: 36.6 (04-23-25 @ 20:46)  HR: 50 (04-24-25 @ 08:43) (49 - 56)  BP: 137/65 (04-24-25 @ 08:43) (108/56 - 137/65)  RR: 18 (04-24-25 @ 08:43) (18 - 18)  SpO2: 98% (04-24-25 @ 08:43) (97% - 98%)  Wt(kg): --  I&O's Summary    23 Apr 2025 07:01  -  24 Apr 2025 07:00  --------------------------------------------------------  IN: 240 mL / OUT: 0 mL / NET: 240 mL    24 Apr 2025 07:01  -  24 Apr 2025 12:22  --------------------------------------------------------  IN: 240 mL / OUT: 0 mL / NET: 240 mL          Appearance: Normal	  HEENT:   Normal oral mucosa, PERRL, EOMI	  Lymphatic: No lymphadenopathy  Cardiovascular: Irregular S1 S2, No JVD, No murmurs, No edema  Respiratory: Lungs clear to auscultation	  Psychiatry: A & O x 3, Mood & affect appropriate  Gastrointestinal:  Soft, Non-tender, + BS	  Skin: No rashes, No ecchymoses, No cyanosis	  Neurologic: Non-focal  Extremities: Normal range of motion, No clubbing, cyanosis or edema  Vascular: Peripheral pulses palpable 2+ bilaterally      LABS:    CARDIAC MARKERS:                                12.7   5.32  )-----------( 114      ( 24 Apr 2025 06:25 )             40.2     04-24    142  |  106  |  30[H]  ----------------------------<  138[H]  4.3   |  27  |  1.11    Ca    9.1      24 Apr 2025 06:24  Phos  2.8     04-23  Mg     1.8     04-23    TPro  7.2  /  Alb  4.2  /  TBili  0.8  /  DBili  x   /  AST  18  /  ALT  14  /  AlkPhos  53  04-22    proBNP:   Lipid Profile:   HgA1c:   TSH:             TELEMETRY: 	    ECG:  	  RADIOLOGY:   DIAGNOSTIC TESTING:  [ ] Echocardiogram:< from: TTE W or WO Ultrasound Enhancing Agent (04.23.25 @ 11:01) >    TRANSTHORACIC ECHOCARDIOGRAM REPORT  ________________________________________________________________________________                                      _______       Pt. Name:       QUINCY MELLO Study Date:    4/23/2025  MRN:   [id number]               YOB: 1950  Accession #:    [id number]                Age:           74 years  Account#:       [id number]             Gender:        M  Heart Rate:     46 bpm                   Height:        69.00 in (175.26 cm)  Rhythm:                                  Weight:        195.00 lb (88.45 kg)  Blood Pressure: 123/55 mmHg              BSA/BMI:       2.04 m² / 28.80 kg/m²  ________________________________________________________________________________________  Referring Physician:    [id number] Kojo Coombs  Interpreting Physician: Darrel Zaragoza MD  Primary Sonographer:    Rolando Durbin RDCS  Fellow (Performing):    Maris Lowry  Fellow (Interpreting):  Jasmin Guardado MD    CPT:               ECHO TTE WO CON COMP W DOPP - 95873.m  Indication(s):     Chest pain, unspecified - R07.9  Procedure:         Transthoracic echocardiogram with 2-D, M-mode and complete                     spectral and color flow Doppler.  Ordering Location: [facility]  Admission Status:  Inpatient  Study Information: Image quality for this study is adequate.    _______________________________________________________________________________________     CONCLUSIONS:      1. Left ventricular cavity is mildly dilated. Left ventricular wall thickness is normal. Left ventricular systolic function is normal with an ejection fraction of 68 % by Faye's method of disks. There are no regional wall motion abnormalities seen.   2. There is moderate (grade 2) left ventricular diastolicdysfunction.   3. Mildly enlarged right ventricular cavity size and normal right ventricular systolic function.   4. Left atrium is severely dilated.   5. The right atrium is severely dilated.   6. Trileaflet aortic valve. Fibrocalcific aortic valve sclerosis without stenosis.   7. Moderate aortic regurgitation.   8. Mild to moderate left ventricular hypertrophy.   9. No pericardial effusion seen.  10. Compared to the transthoracic echocardiogram performed on 4/26/2024, there have been no significant interval changes.      < end of copied text >    [ ]  Catheterization:  [ ] Stress Test:    OTHER:

## 2025-04-24 NOTE — DISCHARGE NOTE NURSING/CASE MANAGEMENT/SOCIAL WORK - FINANCIAL ASSISTANCE
[facility] provides services at a reduced cost to those who are determined to be eligible through [facility]’s financial assistance program. Information regarding [facility]’s financial assistance program can be found by going to [email or web address] or by calling [phone].

## 2025-04-24 NOTE — PROGRESS NOTE ADULT - ASSESSMENT
74 male h/o cad s/p pci, afib on xarelto, dm, here with chest pain     chest pain  acs ruled out with trop neg x2  ekg w/o acute changes   cards consulted   TTE unchanged  f/u stress test results     cad s/p pci  cont plavix  statin    afib  ac with xarelto  bradycardia on tele. holding bb  cont tele monitor   cards f/u     pod eval for foot wounds     cont other home meds    d/w cards     Advanced care planning was discussed with patient and family.  Advanced care planning forms were reviewed and discussed as appropriate.  Differential diagnosis and plan of care discussed with patient after the evaluation.   Pain assessed and judicious use of narcotics when appropriate was discussed.  Importance of Fall prevention discussed.  Counseling on Smoking and Alcohol cessation was offered when appropriate.  Counseling on Diet, exercise, and medication compliance was done.

## 2025-04-24 NOTE — DISCHARGE NOTE NURSING/CASE MANAGEMENT/SOCIAL WORK - PATIENT PORTAL LINK FT
You can access the FollowMyHealth Patient Portal offered by [facility] by registering at the following website: [email or web address]. By joining [employer]’s FollowMyHealth portal, you will also be able to view your health information using other applications (apps) compatible with our system.

## 2025-04-24 NOTE — CHART NOTE - NSCHARTNOTEFT_GEN_A_CORE
Wound Care Team Note:    Request for wound care consult for foot/toe wound received and referred to podiatry. Will defer to podiatry for management. Please contact Podiatry for questions/concerns. Will not follow.    Agueda Yip NP-C, CWOCN via TEAMS

## 2025-04-24 NOTE — DISCHARGE NOTE NURSING/CASE MANAGEMENT/SOCIAL WORK - NSDCPEFALRISK_GEN_ALL_CORE
For information on Fall & Injury Prevention, visit: [email or web address] OR  [email or web address] OR  https://www.cdc.gov/steadi/patient.html

## 2025-04-24 NOTE — DISCHARGE NOTE NURSING/CASE MANAGEMENT/SOCIAL WORK - NSDCFUADDAPPT_GEN_ALL_CORE_FT
Podiatry Discharge Instructions:  Follow up: Please follow up with Dr. Portillo within 1 week of discharge from the hospital, please call [phone] for appointment and discuss that you recently were seen in the hospital.  Wound Care: Please cleanse R posterior heel wound with saline, apply aquacel, 4x4 gauze, pedro and ACE bandage to level of below knee. Please apply ACE bandage from digits to below knee on L.  Weight bearing: Please weight bear as tolerated in a surgical shoe to R and comfortable shoe gear to the L.  Antibiotics: Please continue as instructed.

## 2025-04-27 ENCOUNTER — INPATIENT (INPATIENT)
Facility: HOSPITAL | Age: 75
LOS: 1 days | Discharge: HOME | End: 2025-04-29
Attending: INTERNAL MEDICINE | Admitting: INTERNAL MEDICINE
Payer: MEDICARE

## 2025-04-27 VITALS
DIASTOLIC BLOOD PRESSURE: 72 MMHG | SYSTOLIC BLOOD PRESSURE: 149 MMHG | TEMPERATURE: 98 F | OXYGEN SATURATION: 99 % | RESPIRATION RATE: 18 BRPM | HEIGHT: 69 IN | WEIGHT: 199.96 LBS | HEART RATE: 80 BPM

## 2025-04-27 DIAGNOSIS — I48.91 UNSPECIFIED ATRIAL FIBRILLATION: ICD-10-CM

## 2025-04-27 DIAGNOSIS — Z98.890 OTHER SPECIFIED POSTPROCEDURAL STATES: Chronic | ICD-10-CM

## 2025-04-27 DIAGNOSIS — S98.139A COMPLETE TRAUMATIC AMPUTATION OF ONE UNSPECIFIED LESSER TOE, INITIAL ENCOUNTER: Chronic | ICD-10-CM

## 2025-04-27 DIAGNOSIS — R07.9 CHEST PAIN, UNSPECIFIED: ICD-10-CM

## 2025-04-27 DIAGNOSIS — E13.9 OTHER SPECIFIED DIABETES MELLITUS WITHOUT COMPLICATIONS: ICD-10-CM

## 2025-04-27 DIAGNOSIS — Z98.62 PERIPHERAL VASCULAR ANGIOPLASTY STATUS: Chronic | ICD-10-CM

## 2025-04-27 DIAGNOSIS — I10 ESSENTIAL (PRIMARY) HYPERTENSION: ICD-10-CM

## 2025-04-27 DIAGNOSIS — I73.9 PERIPHERAL VASCULAR DISEASE, UNSPECIFIED: ICD-10-CM

## 2025-04-27 DIAGNOSIS — I25.10 ATHEROSCLEROTIC HEART DISEASE OF NATIVE CORONARY ARTERY WITHOUT ANGINA PECTORIS: ICD-10-CM

## 2025-04-27 LAB
ANION GAP SERPL CALC-SCNC: 9 MMOL/L — SIGNIFICANT CHANGE UP (ref 5–17)
APTT BLD: 158.9 SEC — CRITICAL HIGH (ref 26.1–36.8)
APTT BLD: 32.8 SEC — SIGNIFICANT CHANGE UP (ref 26.1–36.8)
APTT BLD: 77.4 SEC — HIGH (ref 26.1–36.8)
BASOPHILS # BLD AUTO: 0.06 K/UL — SIGNIFICANT CHANGE UP (ref 0–0.2)
BASOPHILS NFR BLD AUTO: 1 % — SIGNIFICANT CHANGE UP (ref 0–2)
BUN SERPL-MCNC: 27 MG/DL — HIGH (ref 7–23)
CALCIUM SERPL-MCNC: 8.8 MG/DL — SIGNIFICANT CHANGE UP (ref 8.4–10.5)
CHLORIDE SERPL-SCNC: 106 MMOL/L — SIGNIFICANT CHANGE UP (ref 96–108)
CK MB CFR SERPL CALC: 6.9 NG/ML — HIGH (ref 0–6.7)
CK SERPL-CCNC: 161 U/L — SIGNIFICANT CHANGE UP (ref 30–200)
CO2 SERPL-SCNC: 24 MMOL/L — SIGNIFICANT CHANGE UP (ref 22–31)
CREAT SERPL-MCNC: 0.95 MG/DL — SIGNIFICANT CHANGE UP (ref 0.5–1.3)
EGFR: 85 ML/MIN/1.73M2 — SIGNIFICANT CHANGE UP
EGFR: 85 ML/MIN/1.73M2 — SIGNIFICANT CHANGE UP
EOSINOPHIL # BLD AUTO: 0.11 K/UL — SIGNIFICANT CHANGE UP (ref 0–0.5)
EOSINOPHIL NFR BLD AUTO: 1.9 % — SIGNIFICANT CHANGE UP (ref 0–6)
GLUCOSE SERPL-MCNC: 131 MG/DL — HIGH (ref 70–99)
HCT VFR BLD CALC: 37.2 % — LOW (ref 39–50)
HCT VFR BLD CALC: 40.4 % — SIGNIFICANT CHANGE UP (ref 39–50)
HGB BLD-MCNC: 12.3 G/DL — LOW (ref 13–17)
HGB BLD-MCNC: 13 G/DL — SIGNIFICANT CHANGE UP (ref 13–17)
IMM GRANULOCYTES NFR BLD AUTO: 0.3 % — SIGNIFICANT CHANGE UP (ref 0–0.9)
INR BLD: 1.22 — HIGH (ref 0.85–1.16)
INR BLD: 1.26 — HIGH (ref 0.85–1.16)
LYMPHOCYTES # BLD AUTO: 1.23 K/UL — SIGNIFICANT CHANGE UP (ref 1–3.3)
LYMPHOCYTES # BLD AUTO: 21 % — SIGNIFICANT CHANGE UP (ref 13–44)
MCHC RBC-ENTMCNC: 31.3 PG — SIGNIFICANT CHANGE UP (ref 27–34)
MCHC RBC-ENTMCNC: 31.9 PG — SIGNIFICANT CHANGE UP (ref 27–34)
MCHC RBC-ENTMCNC: 32.2 G/DL — SIGNIFICANT CHANGE UP (ref 32–36)
MCHC RBC-ENTMCNC: 33.1 G/DL — SIGNIFICANT CHANGE UP (ref 32–36)
MCV RBC AUTO: 96.6 FL — SIGNIFICANT CHANGE UP (ref 80–100)
MCV RBC AUTO: 97.3 FL — SIGNIFICANT CHANGE UP (ref 80–100)
MONOCYTES # BLD AUTO: 0.55 K/UL — SIGNIFICANT CHANGE UP (ref 0–0.9)
MONOCYTES NFR BLD AUTO: 9.4 % — SIGNIFICANT CHANGE UP (ref 2–14)
NEUTROPHILS # BLD AUTO: 3.9 K/UL — SIGNIFICANT CHANGE UP (ref 1.8–7.4)
NEUTROPHILS NFR BLD AUTO: 66.4 % — SIGNIFICANT CHANGE UP (ref 43–77)
NRBC BLD AUTO-RTO: 0 /100 WBCS — SIGNIFICANT CHANGE UP (ref 0–0)
NRBC BLD AUTO-RTO: 0 /100 WBCS — SIGNIFICANT CHANGE UP (ref 0–0)
NT-PROBNP SERPL-SCNC: 407 PG/ML — HIGH (ref 0–300)
PLATELET # BLD AUTO: 119 K/UL — LOW (ref 150–400)
PLATELET # BLD AUTO: 125 K/UL — LOW (ref 150–400)
POTASSIUM SERPL-MCNC: 4.5 MMOL/L — SIGNIFICANT CHANGE UP (ref 3.5–5.3)
POTASSIUM SERPL-SCNC: 4.5 MMOL/L — SIGNIFICANT CHANGE UP (ref 3.5–5.3)
PROTHROM AB SERPL-ACNC: 14 SEC — HIGH (ref 9.9–13.4)
PROTHROM AB SERPL-ACNC: 14.5 SEC — HIGH (ref 9.9–13.4)
RBC # BLD: 3.85 M/UL — LOW (ref 4.2–5.8)
RBC # BLD: 4.15 M/UL — LOW (ref 4.2–5.8)
RBC # FLD: 14.8 % — HIGH (ref 10.3–14.5)
RBC # FLD: 15 % — HIGH (ref 10.3–14.5)
SODIUM SERPL-SCNC: 139 MMOL/L — SIGNIFICANT CHANGE UP (ref 135–145)
TROPONIN T, HIGH SENSITIVITY RESULT: 29 NG/L — SIGNIFICANT CHANGE UP (ref 0–51)
TROPONIN T, HIGH SENSITIVITY RESULT: 32 NG/L — SIGNIFICANT CHANGE UP (ref 0–51)
WBC # BLD: 5.68 K/UL — SIGNIFICANT CHANGE UP (ref 3.8–10.5)
WBC # BLD: 5.87 K/UL — SIGNIFICANT CHANGE UP (ref 3.8–10.5)
WBC # FLD AUTO: 5.68 K/UL — SIGNIFICANT CHANGE UP (ref 3.8–10.5)
WBC # FLD AUTO: 5.87 K/UL — SIGNIFICANT CHANGE UP (ref 3.8–10.5)

## 2025-04-27 PROCEDURE — 93010 ELECTROCARDIOGRAM REPORT: CPT | Mod: 77

## 2025-04-27 PROCEDURE — 99223 1ST HOSP IP/OBS HIGH 75: CPT

## 2025-04-27 PROCEDURE — 93010 ELECTROCARDIOGRAM REPORT: CPT

## 2025-04-27 PROCEDURE — 71045 X-RAY EXAM CHEST 1 VIEW: CPT | Mod: 26

## 2025-04-27 PROCEDURE — 99291 CRITICAL CARE FIRST HOUR: CPT

## 2025-04-27 RX ORDER — LATANOPROST PF 0.05 MG/ML
1 SOLUTION/ DROPS OPHTHALMIC
Refills: 0 | DISCHARGE

## 2025-04-27 RX ORDER — DEXTROSE 50 % IN WATER 50 %
25 SYRINGE (ML) INTRAVENOUS ONCE
Refills: 0 | Status: DISCONTINUED | OUTPATIENT
Start: 2025-04-27 | End: 2025-04-29

## 2025-04-27 RX ORDER — FINASTERIDE 1 MG/1
1 TABLET, FILM COATED ORAL
Refills: 0 | DISCHARGE

## 2025-04-27 RX ORDER — TAMSULOSIN HYDROCHLORIDE 0.4 MG/1
0.4 CAPSULE ORAL AT BEDTIME
Refills: 0 | Status: DISCONTINUED | OUTPATIENT
Start: 2025-04-27 | End: 2025-04-29

## 2025-04-27 RX ORDER — HEPARIN SODIUM 1000 [USP'U]/ML
3500 INJECTION INTRAVENOUS; SUBCUTANEOUS EVERY 6 HOURS
Refills: 0 | Status: DISCONTINUED | OUTPATIENT
Start: 2025-04-27 | End: 2025-04-28

## 2025-04-27 RX ORDER — HEPARIN SODIUM 1000 [USP'U]/ML
5000 INJECTION INTRAVENOUS; SUBCUTANEOUS ONCE
Refills: 0 | Status: COMPLETED | OUTPATIENT
Start: 2025-04-27 | End: 2025-04-27

## 2025-04-27 RX ORDER — HEPARIN SODIUM 1000 [USP'U]/ML
7500 INJECTION INTRAVENOUS; SUBCUTANEOUS EVERY 6 HOURS
Refills: 0 | Status: DISCONTINUED | OUTPATIENT
Start: 2025-04-27 | End: 2025-04-28

## 2025-04-27 RX ORDER — ISOSORBIDE MONONITRATE 60 MG/1
30 TABLET, EXTENDED RELEASE ORAL DAILY
Refills: 0 | Status: DISCONTINUED | OUTPATIENT
Start: 2025-04-27 | End: 2025-04-29

## 2025-04-27 RX ORDER — BRIMONIDINE TARTRATE 1.5 MG/ML
1 SOLUTION/ DROPS OPHTHALMIC
Refills: 0 | DISCHARGE

## 2025-04-27 RX ORDER — BRIMONIDINE TARTRATE 1.5 MG/ML
1 SOLUTION/ DROPS OPHTHALMIC
Refills: 0 | Status: DISCONTINUED | OUTPATIENT
Start: 2025-04-27 | End: 2025-04-29

## 2025-04-27 RX ORDER — ASPIRIN 325 MG
81 TABLET ORAL DAILY
Refills: 0 | Status: DISCONTINUED | OUTPATIENT
Start: 2025-04-27 | End: 2025-04-27

## 2025-04-27 RX ORDER — LISINOPRIL 5 MG/1
40 TABLET ORAL DAILY
Refills: 0 | Status: DISCONTINUED | OUTPATIENT
Start: 2025-04-27 | End: 2025-04-29

## 2025-04-27 RX ORDER — SODIUM CHLORIDE 9 G/1000ML
1000 INJECTION, SOLUTION INTRAVENOUS
Refills: 0 | Status: DISCONTINUED | OUTPATIENT
Start: 2025-04-27 | End: 2025-04-29

## 2025-04-27 RX ORDER — HEPARIN SODIUM 1000 [USP'U]/ML
INJECTION INTRAVENOUS; SUBCUTANEOUS
Qty: 25000 | Refills: 0 | Status: DISCONTINUED | OUTPATIENT
Start: 2025-04-27 | End: 2025-04-27

## 2025-04-27 RX ORDER — MAGNESIUM, ALUMINUM HYDROXIDE 200-200 MG
30 TABLET,CHEWABLE ORAL EVERY 4 HOURS
Refills: 0 | Status: DISCONTINUED | OUTPATIENT
Start: 2025-04-27 | End: 2025-04-29

## 2025-04-27 RX ORDER — RANOLAZINE 1000 MG/1
500 TABLET, FILM COATED, EXTENDED RELEASE ORAL
Refills: 0 | Status: DISCONTINUED | OUTPATIENT
Start: 2025-04-27 | End: 2025-04-29

## 2025-04-27 RX ORDER — CLOPIDOGREL BISULFATE 75 MG/1
75 TABLET, FILM COATED ORAL DAILY
Refills: 0 | Status: DISCONTINUED | OUTPATIENT
Start: 2025-04-27 | End: 2025-04-29

## 2025-04-27 RX ORDER — DEXTROSE 50 % IN WATER 50 %
12.5 SYRINGE (ML) INTRAVENOUS ONCE
Refills: 0 | Status: DISCONTINUED | OUTPATIENT
Start: 2025-04-27 | End: 2025-04-29

## 2025-04-27 RX ORDER — GLUCAGON 3 MG/1
1 POWDER NASAL ONCE
Refills: 0 | Status: DISCONTINUED | OUTPATIENT
Start: 2025-04-27 | End: 2025-04-29

## 2025-04-27 RX ORDER — HEPARIN SODIUM 1000 [USP'U]/ML
7500 INJECTION INTRAVENOUS; SUBCUTANEOUS EVERY 6 HOURS
Refills: 0 | Status: DISCONTINUED | OUTPATIENT
Start: 2025-04-27 | End: 2025-04-27

## 2025-04-27 RX ORDER — INFLUENZA A VIRUS A/IDAHO/07/2018 (H1N1) ANTIGEN (MDCK CELL DERIVED, PROPIOLACTONE INACTIVATED, INFLUENZA A VIRUS A/INDIANA/08/2018 (H3N2) ANTIGEN (MDCK CELL DERIVED, PROPIOLACTONE INACTIVATED), INFLUENZA B VIRUS B/SINGAPORE/INFTT-16-0610/2016 ANTIGEN (MDCK CELL DERIVED, PROPIOLACTONE INACTIVATED), INFLUENZA B VIRUS B/IOWA/06/2017 ANTIGEN (MDCK CELL DERIVED, PROPIOLACTONE INACTIVATED) 15; 15; 15; 15 UG/.5ML; UG/.5ML; UG/.5ML; UG/.5ML
0.5 INJECTION, SUSPENSION INTRAMUSCULAR ONCE
Refills: 0 | Status: DISCONTINUED | OUTPATIENT
Start: 2025-04-27 | End: 2025-04-29

## 2025-04-27 RX ORDER — RAMIPRIL 2.5 MG/1
1 CAPSULE ORAL
Refills: 0 | DISCHARGE

## 2025-04-27 RX ORDER — HEPARIN SODIUM 1000 [USP'U]/ML
1000 INJECTION INTRAVENOUS; SUBCUTANEOUS
Qty: 25000 | Refills: 0 | Status: DISCONTINUED | OUTPATIENT
Start: 2025-04-27 | End: 2025-04-28

## 2025-04-27 RX ORDER — TAMSULOSIN HYDROCHLORIDE 0.4 MG/1
1 CAPSULE ORAL
Refills: 0 | DISCHARGE

## 2025-04-27 RX ORDER — FINASTERIDE 1 MG/1
5 TABLET, FILM COATED ORAL DAILY
Refills: 0 | Status: DISCONTINUED | OUTPATIENT
Start: 2025-04-27 | End: 2025-04-29

## 2025-04-27 RX ORDER — METFORMIN HYDROCHLORIDE 850 MG/1
1 TABLET ORAL
Refills: 0 | DISCHARGE

## 2025-04-27 RX ORDER — LATANOPROST PF 0.05 MG/ML
1 SOLUTION/ DROPS OPHTHALMIC AT BEDTIME
Refills: 0 | Status: DISCONTINUED | OUTPATIENT
Start: 2025-04-27 | End: 2025-04-29

## 2025-04-27 RX ORDER — INSULIN LISPRO 100 U/ML
INJECTION, SOLUTION INTRAVENOUS; SUBCUTANEOUS
Refills: 0 | Status: DISCONTINUED | OUTPATIENT
Start: 2025-04-27 | End: 2025-04-29

## 2025-04-27 RX ORDER — INSULIN LISPRO 100 U/ML
5 INJECTION, SOLUTION INTRAVENOUS; SUBCUTANEOUS
Refills: 0 | Status: DISCONTINUED | OUTPATIENT
Start: 2025-04-27 | End: 2025-04-29

## 2025-04-27 RX ORDER — ATORVASTATIN CALCIUM 80 MG/1
40 TABLET, FILM COATED ORAL AT BEDTIME
Refills: 0 | Status: DISCONTINUED | OUTPATIENT
Start: 2025-04-27 | End: 2025-04-28

## 2025-04-27 RX ORDER — DEXTROSE 50 % IN WATER 50 %
15 SYRINGE (ML) INTRAVENOUS ONCE
Refills: 0 | Status: DISCONTINUED | OUTPATIENT
Start: 2025-04-27 | End: 2025-04-29

## 2025-04-27 RX ORDER — HEPARIN SODIUM 1000 [USP'U]/ML
3500 INJECTION INTRAVENOUS; SUBCUTANEOUS EVERY 6 HOURS
Refills: 0 | Status: DISCONTINUED | OUTPATIENT
Start: 2025-04-27 | End: 2025-04-27

## 2025-04-27 RX ADMIN — ISOSORBIDE MONONITRATE 30 MILLIGRAM(S): 60 TABLET, EXTENDED RELEASE ORAL at 12:23

## 2025-04-27 RX ADMIN — CLOPIDOGREL BISULFATE 75 MILLIGRAM(S): 75 TABLET, FILM COATED ORAL at 12:23

## 2025-04-27 RX ADMIN — FINASTERIDE 5 MILLIGRAM(S): 1 TABLET, FILM COATED ORAL at 12:23

## 2025-04-27 RX ADMIN — INSULIN LISPRO 5 UNIT(S): 100 INJECTION, SOLUTION INTRAVENOUS; SUBCUTANEOUS at 12:23

## 2025-04-27 RX ADMIN — Medication 30 MILLILITER(S): at 13:10

## 2025-04-27 RX ADMIN — LATANOPROST PF 1 DROP(S): 0.05 SOLUTION/ DROPS OPHTHALMIC at 22:56

## 2025-04-27 RX ADMIN — HEPARIN SODIUM 5000 UNIT(S): 1000 INJECTION INTRAVENOUS; SUBCUTANEOUS at 09:28

## 2025-04-27 RX ADMIN — TAMSULOSIN HYDROCHLORIDE 0.4 MILLIGRAM(S): 0.4 CAPSULE ORAL at 21:49

## 2025-04-27 RX ADMIN — HEPARIN SODIUM 1000 UNIT(S)/HR: 1000 INJECTION INTRAVENOUS; SUBCUTANEOUS at 20:56

## 2025-04-27 RX ADMIN — Medication 40 MILLIGRAM(S): at 12:23

## 2025-04-27 RX ADMIN — HEPARIN SODIUM 1000 UNIT(S)/HR: 1000 INJECTION INTRAVENOUS; SUBCUTANEOUS at 20:34

## 2025-04-27 RX ADMIN — HEPARIN SODIUM 1000 UNIT(S)/HR: 1000 INJECTION INTRAVENOUS; SUBCUTANEOUS at 09:30

## 2025-04-27 RX ADMIN — BRIMONIDINE TARTRATE 1 DROP(S): 1.5 SOLUTION/ DROPS OPHTHALMIC at 17:36

## 2025-04-27 RX ADMIN — Medication 30 MILLILITER(S): at 17:36

## 2025-04-27 RX ADMIN — ATORVASTATIN CALCIUM 40 MILLIGRAM(S): 80 TABLET, FILM COATED ORAL at 21:49

## 2025-04-27 RX ADMIN — RANOLAZINE 500 MILLIGRAM(S): 1000 TABLET, FILM COATED, EXTENDED RELEASE ORAL at 17:36

## 2025-04-27 RX ADMIN — HEPARIN SODIUM 1000 UNIT(S)/HR: 1000 INJECTION INTRAVENOUS; SUBCUTANEOUS at 13:27

## 2025-04-27 RX ADMIN — LISINOPRIL 40 MILLIGRAM(S): 5 TABLET ORAL at 12:23

## 2025-04-27 NOTE — H&P ADULT - PROBLEM SELECTOR PLAN 1
Mid to epigastric chest pain, s/p SUBLNTG x1 and nitro patch. Troponin peaked at 32.   No ischemic changes on EKG   - NST  4/22/25: 1. Myocardial Perfusion: Abnormal.   2. Qualitative Perfusion: - small-sized, mild defect(s) in the anterior and anterolateral wall that is reversible consistent with ischemia. - small-sized, moderate defect(s) in the basal inferolateral and basal anterolateral walls that are predominantly fixed, totally normalizes with prone imaging suggestive of diaphragmatic attenuation artifact.   3. The left ventricle is normal in function and moderately enlarged in size. The post stress left ventricular EF is 60 %.     - Will discuss with Dr. Mcgarry ([facility]) in AM if he is a candidate for [facility] to review film. He has previously been cathed by Dr. Cabrera in 2024 with little residual disease.   - c/w Heparin gtt and Plavix  - Start ranexa and PPI

## 2025-04-27 NOTE — ED ADULT TRIAGE NOTE - CHIEF COMPLAINT QUOTE
Pt presents to ED c/o intermittent midsternal CP started Tuesday, denies any SOB, non radiating.  Pt states he was admitted @ [facility] on Tuesday & was discharged on Thursday with the same complains

## 2025-04-27 NOTE — H&P ADULT - NSHPLABSRESULTS_GEN_ALL_CORE
Data reviewed:  - Telemetry: AF 49-56   - ECG (date***):   - TTE (date***):   - Chest x-ray (date***):   - Stress test:   - CCTA:  - Cardiac catheterization:  - Cardiac MRI:    - Labs:                        13.0   5.87  )-----------( 119      ( 27 Apr 2025 07:17 )             40.4     04-27    139  |  106  |  27[H]  ----------------------------<  131[H]  4.5   |  24  |  0.95    Ca    8.8      27 Apr 2025 07:17      PT/INR - ( 27 Apr 2025 08:30 )   PT: 14.5 sec;   INR: 1.26          PTT - ( 27 Apr 2025 08:30 )  PTT:32.8 sec            Urinalysis Basic - ( 27 Apr 2025 07:17 )    Color: x / Appearance: x / SG: x / pH: x  Gluc: 131 mg/dL / Ketone: x  / Bili: x / Urobili: x   Blood: x / Protein: x / Nitrite: x   Leuk Esterase: x / RBC: x / WBC x   Sq Epi: x / Non Sq Epi: x / Bacteria: x Data reviewed:  - Telemetry: AF 49-56     - Labs:                        13.0   5.87  )-----------( 119      ( 27 Apr 2025 07:17 )             40.4     04-27    139  |  106  |  27[H]  ----------------------------<  131[H]  4.5   |  24  |  0.95    Ca    8.8      27 Apr 2025 07:17      PT/INR - ( 27 Apr 2025 08:30 )   PT: 14.5 sec;   INR: 1.26          PTT - ( 27 Apr 2025 08:30 )  PTT:32.8 sec            Urinalysis Basic - ( 27 Apr 2025 07:17 )    Color: x / Appearance: x / SG: x / pH: x  Gluc: 131 mg/dL / Ketone: x  / Bili: x / Urobili: x   Blood: x / Protein: x / Nitrite: x   Leuk Esterase: x / RBC: x / WBC x   Sq Epi: x / Non Sq Epi: x / Bacteria: x

## 2025-04-27 NOTE — H&P ADULT - HISTORY OF PRESENT ILLNESS
74 YOM, with a pmhx CAD s/p PCI x2 (most recent was desx1 LAD 2/2024), DMII, PAD s/p L TMA, pAF on Xarelto (last dose 4/26 AM), HTN, HLD with recent admission to [facility] 4/22-4/24 for chest pain, now presenting to [facility] with midsternal chest pain radiating to the epigastric area. He states that it was so bad that it woke him up out his sleep at a 10/10 causing him to take 1 SUBL NTG and he also put a nitro patch on. He states that it has worsened since he left [facility], however, they weren't concerned iven the NST revealed mild to moderate defect. He states that he has been unable to ambulate in fear that he will have worsening chest pain. Denies bloody stools, n/v/d. States he was schedule to follow up with his interventionalist Rick Ramos that he has not followed up with since his last [facility] in 2024. Denies syncope, headcahce, dizziness, diaphoresis or recent illness.       In the ER:   Vitals: T(F): 98.1, Max: 98.1, HR: 56 (48 - 80), BP: 137/71 (130/88 - 149/72) RR: 18 (18 - 19), SpO2: 99% (99% - 99%)       Medications:  aspirin enteric coated 81 milliGRAM(s) Oral daily  atorvastatin 40 milliGRAM(s) Oral at bedtime  clopidogrel Tablet 75 milliGRAM(s) Oral daily  dextrose 50% Injectable 25 Gram(s) IV Push once  dextrose 50% Injectable 12.5 Gram(s) IV Push once  dextrose 50% Injectable 25 Gram(s) IV Push once  glucagon  Injectable 1 milliGRAM(s) IntraMuscular once  insulin lispro (ADMELOG) corrective regimen sliding scale   SubCutaneous three times a day before meals  insulin lispro Injectable (ADMELOG) 5 Unit(s) SubCutaneous three times a day before meals    Drips:  dextrose 5%. 1000 milliLiter(s) (100 mL/Hr) IV Continuous <Continuous>  dextrose 5%. 1000 milliLiter(s) (50 mL/Hr) IV Continuous <Continuous>  heparin  Infusion.  Unit(s)/Hr (10 mL/Hr) IV Continuous <Continuous>    PRN:     Allergies    No Known Allergies    Intolerances        Home Medications:   74 YOM, with a pmhx CAD s/p PCI x2 (most recent was desx1 LAD 2/2024), DMII, PAD s/p L TMA, pAF on Xarelto (last dose 4/26 AM), HTN, HLD with recent admission to [facility] 4/22-4/24 for chest pain, now presenting to [facility] with midsternal chest pain radiating to the epigastric area. He states that it was so bad that it woke him up out his sleep at a 10/10 causing him to take 1 SUBL NTG and he also put a nitro patch on. He states that it has worsened since he left [facility], however, they weren't concerned iven the NST revealed mild to moderate defect. He states that he has been unable to ambulate in fear that he will have worsening chest pain. Denies bloody stools, n/v/d. States he was schedule to follow up with his interventionalist Rick Ramos that he has not followed up with since his last [facility] in 2024. Denies syncope, headcahce, dizziness, diaphoresis or recent illness.       In the ER:   Vitals: T(F): 98.1, Max: 98.1, HR: 56 (48 - 80), BP: 137/71 (130/88 - 149/72) RR: 18 (18 - 19), SpO2: 99% (99% - 99%)  Imaging: None  Lab: Troponin  BNP    EKG: Slow AF 49-56   Interventions: Heparin ACS   74 YOM, with a pmhx CAD s/p PCI x2 (most recent was desx1 LAD 2/2024), DMII, PAD s/p L TMA, pAF on Xarelto (last dose 4/26 AM), HTN, HLD with recent admission to [facility] 4/22-4/24 for chest pain, now presenting to [facility] with midsternal chest pain radiating to the epigastric area. He states that it was so bad that it woke him up out his sleep at a 10/10 causing him to take 1 SUBL NTG and he also put a nitro patch on. He states that it has worsened since he left [facility], however, they weren't concerned iven the NST revealed mild to moderate defect. He states that he has been unable to ambulate in fear that he will have worsening chest pain. Denies bloody stools, n/v/d. States he was schedule to follow up with his interventionalist Rick Ramos that he has not followed up with since his last [facility] in 2024. Denies syncope, headcahce, dizziness, diaphoresis or recent illness.       In the ER:   Vitals: T(F): 98.1, Max: 98.1, HR: 56 (48 - 80), BP: 137/71 (130/88 - 149/72) RR: 18 (18 - 19), SpO2: 99% (99% - 99%)  Imaging: None  Lab: Troponin  29 BNP  409  EKG: Slow AF 49-56   Interventions: Heparin ACS

## 2025-04-27 NOTE — H&P ADULT - NS ATTEND AMEND GEN_ALL_CORE FT
74 YOM, with a pmhx CAD s/p PCI x2 (most recent was desx1 LAD 2/2024), DMII, PAD s/p L TMA, pAF on Xarelto (last dose 4/26 AM), HTN, HLD with recent admission to [facility] 4/22-4/24 for chest pain, now presenting to [facility] with midsternal chest pain radiating to the epigastric area that woke him up out of his sleep, releived by SUBL NTG. Now admitted to cardiac telemetry for ischemic workup.    - chronic stable angina vs GERD more likely GERD  - add PPI and H2 blocker  - stop asa  - d/w interventional regarding anatomy  - very bradycardic precluding bb  - continue rest o fmeds hold xarelto pending possible cath will review with cath lab

## 2025-04-27 NOTE — ED ADULT NURSE NOTE - CHIEF COMPLAINT QUOTE
Pt presents to ED c/o intermittent midsternal CP started Tuesday, denies any SOB, non radiating.  Pt states he was admitted @ [facility] on Tuesday & was discharged on Thursday with the same complains

## 2025-04-27 NOTE — PATIENT PROFILE ADULT - FALL HARM RISK - HARM RISK INTERVENTIONS

## 2025-04-27 NOTE — H&P ADULT - NSHPPHYSICALEXAM_GEN_ALL_CORE
Physical exam:  General: No apparent distress  HEENT: Anicteric sclera. Moist mucous membranes. JVD -    Cardiac: irregular rate and rhythm. No murmurs, rubs, or gallops.   Vascular: Symmetric radial pulses. DP and PT pulses unable to palpate given   Respiratory: Normal effort. Clear to ascultation.   Abdomen: Soft, nontender. Audible bowel sounds.   Extremities: Warm with no edema. No cyanosis or clubbing.   Skin: Warm and dry. No rash.   Neurologic: Grossly normal motor function.   Psychiatric: Oriented to person, place, and time. Physical exam:  General: No apparent distress  HEENT: Anicteric sclera. Moist mucous membranes. JVD -    Cardiac: irregular rate and rhythm. No murmurs, rubs, or gallops.   Vascular: Symmetric radial pulses. DP and PT pulses unable to palpate given bilateral UNNA boots and ACE wraps   Respiratory: Normal effort. Clear to ascultation.   Abdomen: Soft, nontender. Audible bowel sounds.   Extremities: Warm with no edema. No cyanosis or clubbing.   Skin: Warm and dry. No rash.   Neurologic: Grossly normal motor function.   Psychiatric: Oriented to person, place, and time.

## 2025-04-27 NOTE — ED PROVIDER NOTE - ENDOCRINE NEGATIVE STATEMENT, MLM
5/17/2023    Chito Coppola MD  [facility] [address] [address]    RE: Charlotte Blair       Dear Colleague,     I had the pleasure of seeing Charlotte Blair in the [facility].    Cardiology Clinic Office Note    Assessment / Plan:    1.  26-year-old woman with bileaflet mitral valve prolapse, severe mitral insufficiency, and Horn syndrome.  Progressive exertional dyspnea may wendy a decrease in ventricular function, we discussed that we will likely need to proceed sooner rather than later for valve repair or replacement, likely within the next year.  She understands that and is interested in further investigation at this time.  She will speak with her GYN for fertility evaluation, we will reschedule an echocardiogram, and planned surgical consultation to discuss treatment options.  2.  Palpitations.  No arrhythmias associated with her palpitations on monitoring.  3.  Sedentary lifestyle.  Advised moderate walking on a daily basis as an assessment of activity tolerance.    Follow-up dependent upon echo results    ______________________________________________________________________    Subjective:    I had the opportunity to see Charlotte Blair at the [facility]. Charlotte Blair is a 26 year old female with a history of Horn syndrome, bileaflet mitral valve prolapse with severe mitral insufficiency who I met last fall.  Transesophageal echo disclosed bileaflet prolapse with severe mitral insufficiency and preserved left ventricular systolic function.  Follow-up to discuss treatment options was requested, we are meeting today for the first time after her test results in October.    She has noticed that over the last year, her activity tolerance is decreased.  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  She notes that climbing a flight of steps is not as easy as  it was a year ago.  She does not engage in any regular exercise regimen.  Her weight is down a few pounds from when I saw her last year.  Her sleep remains restorative.  She has had no orthopnea or paroxysmal nocturnal dyspnea or syncope or chest pains.  She does still note palpitations, which were not associated with arrhythmias on a monitor last fall.  1 x5 beat run of supraventricular tachycardia was noted but otherwise no atrial fibrillation was present.  Severe left atrial enlargement was demonstrated on the transesophageal echo.    She reports that her entire life she has had discussions regarding the eventual need for heart valve surgery.  We did discuss potential options for treatment including mechanical valve prosthesis, bioprosthesis, and valve repair, all  open chest procedures.  She has not sought an evaluation of her fertility with Horn's syndrome to determine whether that may influence her choice of valve treatment.  She was encouraged to do so.  We discussed the need to definitively treat the valve before deterioration in ventricular function has occurred, she seems to understand this concept as we have discussed it previously.    ______________________________________________________________________    Problem List:  Patient Active Problem List   Diagnosis    Horn Syndrome    Anxiety    Acquired hypothyroidism    Class 1 obesity due to excess calories with serious comorbidity and body mass index (BMI) of 31.0 to 31.9 in adult    Counseling for birth control, oral contraceptives    Severe mitral insufficiency    Major depression in complete remission (H)    Mitral valve prolapse     Medical History:  Past Medical History:   Diagnosis Date    Anxiety     previous on zoloft    Disease of thyroid gland     hypothyroxine    Gonadal dysgenesis     Created by Conversion     Urinary tract infection     non recurrent     Surgical History:  Past Surgical History:   Procedure Laterality Date    ONEIL  TOOTH EXTRACTION       Social History:  Social History     Socioeconomic History    Marital status: Single     Spouse name: Not on file    Number of children: Not on file    Years of education: Not on file    Highest education level: Not on file   Occupational History    Not on file   Tobacco Use    Smoking status: Every Day     Types: Cigarettes    Smokeless tobacco: Never   Vaping Use    Vaping status: Every Day   Substance and Sexual Activity    Alcohol use: No    Drug use: No    Sexual activity: Yes     Partners: Male     Birth control/protection: OCP, Condom   Other Topics Concern    Not on file   Social History Narrative    Lives in apartment with roommate.   Works .  Single.       Social Determinants of Health     Financial Resource Strain: Not on file   Food Insecurity: Not on file   Transportation Needs: Not on file   Physical Activity: Not on file   Stress: Not on file   Social Connections: Not on file   Intimate Partner Violence: Not on file   Housing Stability: Not on file     Sleep History:  Restorative supine.  No paroxysmal nocturnal dyspnea.  Exercise History:  Tires walking up a long flight of steps.  Decreased activity tolerance versus a year ago    Review of Systems:      Positive for shortness of breath with activity, palpitations, anxiety.  12 point review of systems otherwise negative     Please refer above for cardiac ROS details.         Family History:  Family History   Problem Relation Age of Onset    Coronary Artery Disease Father     Hypertension Mother     Ovarian cysts Sister     Hyperlipidemia Brother     No Known Problems Maternal Grandmother     Coronary Artery Disease Maternal Grandfather     Parkinsonism Paternal Grandmother     Suicidality Paternal Grandfather          Allergies:  Allergies   Allergen Reactions    Amoxicillin Hives    Penicillins Hives     Medications:  Current Outpatient Medications   Medication Sig Dispense Refill    norgestim-eth estrad triphasic  "(ORTHO TRI-CYCLEN) 0.18/0.215/0.25 MG-35 MCG tablet Take 1 tablet by mouth daily 84 tablet 2       Objective:   Wt Readings from Last 3 Encounters:   05/17/23 77.1 kg (170 lb)   10/27/22 79.8 kg (176 lb)   10/25/22 79.9 kg (176 lb 3 oz)     Vital signs:  BP (!) 142/82 (BP Location: Left arm, Patient Position: Sitting, Cuff Size: Adult Regular)   Pulse 104   Resp 16   Ht 1.575 m (5' 2\")   Wt 77.1 kg (170 lb)   BMI 31.09 kg/m        Physical Exam:    General Appearance : Awake, Alert, No acute distress  HEENT: No Scleral icterus; the mucous membranes were pink and moist.  Conjunctivae not injected  Neck:  No cervical bruits, jugular venous distention, or thyromegaly.  Webbed.  Chest: The spine was straight. Chest wall symmetric  Lungs: Respirations unlabored; the lungs are clear to auscultation.  No wheezing   Cardiovascular:   Normal point of maximal impulse.  Auscultation reveals regular first and second heart sounds with 3/6 to 4/6 holosystolic murmur left lower sternal border radiating through to the back.  Possible S3 gallop..  Carotid, radial, and dorsalis pedal pulses are intact and symmetric.    Abdomen: Rounded.  No organomegaly, masses, bruits, or tenderness. Bowels sounds are present  Extremities:  No clubbing, cyanosis.  Thick, no edema  Skin: No rash, bruising  Musculoskeletal: No tenderness.  Neurologic: Alert and oriented ×3. Speech is fluent.    Lab Results:  LIPIDS:  Lab Results   Component Value Date    CHOL 189 01/14/2022    CHOL 185 04/13/2021    CHOL 208 (H) 02/17/2020     Lab Results   Component Value Date    HDL 64 01/14/2022    HDL 59 04/13/2021    HDL 70 02/17/2020     Lab Results   Component Value Date     01/14/2022     04/13/2021     02/17/2020     Lab Results   Component Value Date    TRIG 99 01/14/2022    TRIG 57 04/13/2021    TRIG 71 02/17/2020       Transesophageal echo 10/2022:  Left ventricular size, wall motion and function are normal. The ejection  fraction " is 60-65%.  Normal right ventricle size and systolic function.  Systolic flow reversal visualized in left superior pulmonary vein only.  There is severe (4+) mitral regurgitation.  The mitral valve leaflets appear myxomatous.  Severe mitral valve prolapse, anterior leaflet  Severe mitral valve prolapse, posterior leaflet  The left atrium is moderate to severely dilated.    Holter monitoring from 9/8/2022 to 9/9/2022 (duration 24hrs). Predominant underlying rhythm was sinus rhythm, 58 to 125bpm, average 93bpm. 1 episode of nonsustained supraventricular tachycardia - 5 beats, 176bpm. No sustained tachyarrhythmias. No atrial fibrillation. There were no pauses of greater than 3 seconds. Rare supraventricular ectopic beats (<1%). Rare premature ventricular contractions (<1%). Symptom triggers for palpitations (2) correlated to sinus rhythm 89-103bpm        JUAN PABLO YUNG MD [facility]  [phone]    This note created using Dragon voice recognition software.  Sound alike errors may have escaped editing.  67 minutes today spent in discussing treatment options, need for further evaluation, chart review, image review.          Thank you for allowing me to participate in the care of your patient.      Sincerely,     Juan Pablo Yung MD     [facility]  cc:   Juan Pablo Yung MD  [address] [facility] [address]  [place] [address]         no diabetes and no thyroid trouble.

## 2025-04-27 NOTE — H&P ADULT - ASSESSMENT
74 YOM, with a pmhx CAD s/p PCI x2 (most recent was desx1 LAD 2/2024), DMII, PAD s/p L TMA, pAF on Xarelto (last dose 4/26 AM), HTN, HLD with recent admission to [facility] 4/22-4/24 for chest pain, now presenting to [facility] with midsternal chest pain radiating to the epigastric area that woke him up out of his sleep, releived by SUBL NTG. Now admitted to cardiac telemetry for ischemic workup.

## 2025-04-27 NOTE — H&P ADULT - PROBLEM SELECTOR PLAN 2
s/p [facility] 4/24/24: DESx1 mLAD with mild ISR to pLAD FELA and OM1 stent patent.   c/w Heparin and plavix, atorvastatin 40 mg QD, start Imdur 30 mg QD   f/u AM lipid panel

## 2025-04-27 NOTE — ED ADULT NURSE REASSESSMENT NOTE - NS ED NURSE REASSESS COMMENT FT1
Pt refusing 2nd IV access, requests to speak with provider to confirm 2nd line is needed. team made aware.

## 2025-04-27 NOTE — ED PROVIDER NOTE - CLINICAL SUMMARY MEDICAL DECISION MAKING FREE TEXT BOX
73 y/o m with PMH of CAD s/ PCI (last 4/2024), DM, venous insufficiency with skin ulcerations, HTN, HLD presents to ED  with chest pain x 6 days.  Pt states he has never had cp before and now it is occurring at rest and woke him up out of sleep at 5am.  Pt states he took nitro which relieved pain from a 10 to a 6.  Pt admitted from 4/22 to 4/24 at [facility] for cp and had stress test showing  1. Myocardial Perfusion: Abnormal.   2. Qualitative Perfusion:      - small-sized, mild defect(s) in the anterior and anterolateral wall that is reversible consistent with ischemia. - small-sized, moderate defect(s) in the basal inferolateral and basal anterolateral walls that are predominantly fixed, totally normalizes with prone imaging suggestive of diaphragmatic attenuation artifact.   3. The left ventricle is normal in function and moderately enlarged in size. The post stress left ventricular EF is 60 %. The stress end diastolic volume is 162 ml and systolic volume is 64 ml.   4. This study was compared to prior study performed on 4/25/2024. Perfusion images are similar.  Pt was instructed to follow up as outpt with his interventional cardiologist Dr. Cabrera.    General cardiologist Dr. Saulo Iraheta.  Pt now complains of 4/10 pain.  Currently states lower leg venous ulcer is healing.  No fever or chills.      VS bradycardic a fib  EKG nereida a fib  trop 32 from 27 at [facility]  pt on plavix daily   Will start heparin drip for unstable angina  Discussed with Dr. Tiwari and PA and will admit to cards

## 2025-04-27 NOTE — ED PROVIDER NOTE - OBJECTIVE STATEMENT
73 y/o m with PMH of CAD s/ PCI (last 4/2024), DM, venous insufficiency with skin ulcerations, HTN, HLD presents to ED  with chest pain x 6 days.  Pt states he has never had cp before and now it is occurring at rest and woke him up out of sleep at 5am.  Pt states he took nitro which relieved pain from a 10 to a 6.  Pt admitted from 4/22 to 4/24 at [facility] for cp and had stress test showing  1. Myocardial Perfusion: Abnormal.   2. Qualitative Perfusion:      - small-sized, mild defect(s) in the anterior and anterolateral wall that is reversible consistent with ischemia. - small-sized, moderate defect(s) in the basal inferolateral and basal anterolateral walls that are predominantly fixed, totally normalizes with prone imaging suggestive of diaphragmatic attenuation artifact.   3. The left ventricle is normal in function and moderately enlarged in size. The post stress left ventricular EF is 60 %. The stress end diastolic volume is 162 ml and systolic volume is 64 ml.   4. This study was compared to prior study performed on 4/25/2024. Perfusion images are similar.  Pt was instructed to follow up as outpt with his interventional cardiologist Dr. Cabrera.    General cardiologist Dr. Saulo Iraheta.  Pt now complains of 4/10 pain.  Currently states lower leg venous ulcer is healing.  No fever or chills.

## 2025-04-27 NOTE — H&P ADULT - PROBLEM SELECTOR PLAN 6
currently in slow AF 49-56   Rate control: none bradycaardic   Rhythm control: None   AC: Xarelto 20 mg QD at home, on Heparin Full AC       F: None  E: Replete if K<4 or Mag<2  N: DASH Diet  GIppx: PPI   VTEppx: Heparin FULL  Dispo:pending   PT: pending

## 2025-04-27 NOTE — ED ADULT NURSE NOTE - NSFALLUNIVINTERV_ED_ALL_ED
Bed/Stretcher in lowest position, wheels locked, appropriate side rails in place/Call bell, personal items and telephone in reach/Instruct patient to call for assistance before getting out of bed/chair/stretcher/Non-slip footwear applied when patient is off stretcher/[place] to call system/Physically safe environment - no spills, clutter or unnecessary equipment/Purposeful proactive rounding/Room/bathroom lighting operational, light cord in reach

## 2025-04-27 NOTE — ED ADULT NURSE NOTE - NSHOSCREENINGQ1_ED_ALL_ED
Due to patient being COVID-19+ and in droplet plus isolation, face-to-face encounter was not performed in order to preserve PPE and limit exposure    Cardiac risk stratification as noted in initial consultation yesterday 5/19  Case discussed today with nursing staff. No new complaints or issues overnight per nursing staff. He is for surgical intervention this afternoon.     Cardiology will sign off at this time  Please call with any questions or concerns      Viet Potts 94 Cardiology No

## 2025-04-28 ENCOUNTER — RESULT REVIEW (OUTPATIENT)
Age: 75
End: 2025-04-28

## 2025-04-28 ENCOUNTER — TRANSCRIPTION ENCOUNTER (OUTPATIENT)
Age: 75
End: 2025-04-28

## 2025-04-28 DIAGNOSIS — E78.5 HYPERLIPIDEMIA, UNSPECIFIED: ICD-10-CM

## 2025-04-28 DIAGNOSIS — R00.1 BRADYCARDIA, UNSPECIFIED: ICD-10-CM

## 2025-04-28 DIAGNOSIS — I10 ESSENTIAL (PRIMARY) HYPERTENSION: ICD-10-CM

## 2025-04-28 LAB
ALBUMIN SERPL ELPH-MCNC: 3.8 G/DL — SIGNIFICANT CHANGE UP (ref 3.3–5)
ALP SERPL-CCNC: 59 U/L — SIGNIFICANT CHANGE UP (ref 40–120)
ALT FLD-CCNC: 16 U/L — SIGNIFICANT CHANGE UP (ref 10–45)
ANION GAP SERPL CALC-SCNC: 10 MMOL/L — SIGNIFICANT CHANGE UP (ref 5–17)
APTT BLD: 77.2 SEC — HIGH (ref 26.1–36.8)
AST SERPL-CCNC: 21 U/L — SIGNIFICANT CHANGE UP (ref 10–40)
BILIRUB SERPL-MCNC: 0.4 MG/DL — SIGNIFICANT CHANGE UP (ref 0.2–1.2)
BUN SERPL-MCNC: 29 MG/DL — HIGH (ref 7–23)
CALCIUM SERPL-MCNC: 8.4 MG/DL — SIGNIFICANT CHANGE UP (ref 8.4–10.5)
CHLORIDE SERPL-SCNC: 101 MMOL/L — SIGNIFICANT CHANGE UP (ref 96–108)
CO2 SERPL-SCNC: 26 MMOL/L — SIGNIFICANT CHANGE UP (ref 22–31)
CREAT SERPL-MCNC: 0.93 MG/DL — SIGNIFICANT CHANGE UP (ref 0.5–1.3)
EGFR: 87 ML/MIN/1.73M2 — SIGNIFICANT CHANGE UP
EGFR: 87 ML/MIN/1.73M2 — SIGNIFICANT CHANGE UP
GLUCOSE SERPL-MCNC: 176 MG/DL — HIGH (ref 70–99)
HCT VFR BLD CALC: 37.5 % — LOW (ref 39–50)
HGB BLD-MCNC: 12.2 G/DL — LOW (ref 13–17)
MAGNESIUM SERPL-MCNC: 1.9 MG/DL — SIGNIFICANT CHANGE UP (ref 1.6–2.6)
MCHC RBC-ENTMCNC: 31.1 PG — SIGNIFICANT CHANGE UP (ref 27–34)
MCHC RBC-ENTMCNC: 32.5 G/DL — SIGNIFICANT CHANGE UP (ref 32–36)
MCV RBC AUTO: 95.7 FL — SIGNIFICANT CHANGE UP (ref 80–100)
NRBC BLD AUTO-RTO: 0 /100 WBCS — SIGNIFICANT CHANGE UP (ref 0–0)
PLATELET # BLD AUTO: 113 K/UL — LOW (ref 150–400)
POTASSIUM SERPL-MCNC: 4.1 MMOL/L — SIGNIFICANT CHANGE UP (ref 3.5–5.3)
POTASSIUM SERPL-SCNC: 4.1 MMOL/L — SIGNIFICANT CHANGE UP (ref 3.5–5.3)
PROT SERPL-MCNC: 6.9 G/DL — SIGNIFICANT CHANGE UP (ref 6–8.3)
RBC # BLD: 3.92 M/UL — LOW (ref 4.2–5.8)
RBC # FLD: 14.9 % — HIGH (ref 10.3–14.5)
SODIUM SERPL-SCNC: 137 MMOL/L — SIGNIFICANT CHANGE UP (ref 135–145)
WBC # BLD: 5.85 K/UL — SIGNIFICANT CHANGE UP (ref 3.8–10.5)
WBC # FLD AUTO: 5.85 K/UL — SIGNIFICANT CHANGE UP (ref 3.8–10.5)

## 2025-04-28 PROCEDURE — 92928 PRQ TCAT PLMT NTRAC ST 1 LES: CPT | Mod: LD

## 2025-04-28 PROCEDURE — 99233 SBSQ HOSP IP/OBS HIGH 50: CPT

## 2025-04-28 PROCEDURE — 93306 TTE W/DOPPLER COMPLETE: CPT | Mod: 26

## 2025-04-28 PROCEDURE — 93010 ELECTROCARDIOGRAM REPORT: CPT

## 2025-04-28 PROCEDURE — 0523T NTRAPX C FFR W/3D FUNCJL MAP: CPT

## 2025-04-28 PROCEDURE — 99152 MOD SED SAME PHYS/QHP 5/>YRS: CPT

## 2025-04-28 PROCEDURE — 93458 L HRT ARTERY/VENTRICLE ANGIO: CPT | Mod: 26,59

## 2025-04-28 RX ORDER — ATORVASTATIN CALCIUM 80 MG/1
80 TABLET, FILM COATED ORAL AT BEDTIME
Refills: 0 | Status: DISCONTINUED | OUTPATIENT
Start: 2025-04-28 | End: 2025-04-29

## 2025-04-28 RX ORDER — MAGNESIUM OXIDE 400 MG
400 TABLET ORAL ONCE
Refills: 0 | Status: COMPLETED | OUTPATIENT
Start: 2025-04-28 | End: 2025-04-28

## 2025-04-28 RX ORDER — RIVAROXABAN 10 MG/1
20 TABLET, FILM COATED ORAL
Refills: 0 | Status: DISCONTINUED | OUTPATIENT
Start: 2025-04-29 | End: 2025-04-29

## 2025-04-28 RX ORDER — ASPIRIN 325 MG
325 TABLET ORAL ONCE
Refills: 0 | Status: COMPLETED | OUTPATIENT
Start: 2025-04-28 | End: 2025-04-28

## 2025-04-28 RX ADMIN — FINASTERIDE 5 MILLIGRAM(S): 1 TABLET, FILM COATED ORAL at 12:03

## 2025-04-28 RX ADMIN — RANOLAZINE 500 MILLIGRAM(S): 1000 TABLET, FILM COATED, EXTENDED RELEASE ORAL at 18:33

## 2025-04-28 RX ADMIN — CLOPIDOGREL BISULFATE 75 MILLIGRAM(S): 75 TABLET, FILM COATED ORAL at 12:03

## 2025-04-28 RX ADMIN — ATORVASTATIN CALCIUM 80 MILLIGRAM(S): 80 TABLET, FILM COATED ORAL at 21:12

## 2025-04-28 RX ADMIN — LISINOPRIL 40 MILLIGRAM(S): 5 TABLET ORAL at 06:53

## 2025-04-28 RX ADMIN — BRIMONIDINE TARTRATE 1 DROP(S): 1.5 SOLUTION/ DROPS OPHTHALMIC at 06:53

## 2025-04-28 RX ADMIN — LATANOPROST PF 1 DROP(S): 0.05 SOLUTION/ DROPS OPHTHALMIC at 21:12

## 2025-04-28 RX ADMIN — TAMSULOSIN HYDROCHLORIDE 0.4 MILLIGRAM(S): 0.4 CAPSULE ORAL at 21:13

## 2025-04-28 RX ADMIN — Medication 325 MILLIGRAM(S): at 12:41

## 2025-04-28 RX ADMIN — HEPARIN SODIUM 1000 UNIT(S)/HR: 1000 INJECTION INTRAVENOUS; SUBCUTANEOUS at 08:31

## 2025-04-28 RX ADMIN — Medication 500 MILLILITER(S): at 12:16

## 2025-04-28 RX ADMIN — Medication 400 MILLIGRAM(S): at 08:43

## 2025-04-28 RX ADMIN — Medication 250 MILLILITER(S): at 14:46

## 2025-04-28 RX ADMIN — ISOSORBIDE MONONITRATE 30 MILLIGRAM(S): 60 TABLET, EXTENDED RELEASE ORAL at 12:03

## 2025-04-28 RX ADMIN — RANOLAZINE 500 MILLIGRAM(S): 1000 TABLET, FILM COATED, EXTENDED RELEASE ORAL at 06:53

## 2025-04-28 RX ADMIN — HEPARIN SODIUM 1000 UNIT(S)/HR: 1000 INJECTION INTRAVENOUS; SUBCUTANEOUS at 10:45

## 2025-04-28 RX ADMIN — Medication 40 MILLIGRAM(S): at 08:42

## 2025-04-28 RX ADMIN — Medication 75 MILLILITER(S): at 12:03

## 2025-04-28 RX ADMIN — HEPARIN SODIUM 1000 UNIT(S)/HR: 1000 INJECTION INTRAVENOUS; SUBCUTANEOUS at 03:27

## 2025-04-28 RX ADMIN — BRIMONIDINE TARTRATE 1 DROP(S): 1.5 SOLUTION/ DROPS OPHTHALMIC at 18:24

## 2025-04-28 NOTE — DISCHARGE NOTE PROVIDER - NSDCFUSCHEDAPPT_GEN_ALL_CORE_FT
Ajit Segura  [facility] Physician [facility]  [address]  Scheduled Appointment: 05/20/2025     Edel Mcgarry Physician [facility]  [address]ASC [address]  Scheduled Appointment: 05/01/2025

## 2025-04-28 NOTE — PROGRESS NOTE ADULT - ASSESSMENT
74 YOM, with a pmhx CAD s/p PCI x2 (most recent was desx1 LAD 2/2024), DMII, PAD s/p L TMA, pAF on Xarelto (last dose 4/26 AM), HTN, HLD with recent admission to [facility] 4/22-4/24 for chest pain, now presenting to [facility] with midsternal chest pain radiating to the epigastric area that woke him up out of his sleep, releived by SUBL NTG. Admitted to cardiac telemetry for ischemic workup. Pt now s/p LHC x1 FELA to dLAD.  75 yo M, PMHx of CAD s/p PCI x 2 (Most recent FELA x1 LAD 2/2024), DM2, PAD s/p L TMA, pAF on Xarelto (last dose 4/26 AM), HTN, HLD with recent admission to [facility] 4/22-4/24 for chest pain, now presenting to [facility] with midsternal chest pain radiating to the epigastric area that woke him up out of his sleep, relieved by SL NTG. Admitted to cardiac telemetry for unstable angina. Now s/p [facility] 4/28 with FELA to dLAD.

## 2025-04-28 NOTE — DISCHARGE NOTE PROVIDER - PROVIDER TOKENS
PROVIDER:[TOKEN:[[id number]],FOLLOWUP:[1 week],ESTABLISHEDPATIENT:[T]] PROVIDER:[TOKEN:[[id number]],SCHEDULEDAPPT:[05/01/2025],SCHEDULEDAPPTTIME:[02:00 PM]]

## 2025-04-28 NOTE — DISCHARGE NOTE PROVIDER - NSDCMRMEDTOKEN_GEN_ALL_CORE_FT
atorvastatin 40 mg oral tablet: 1 tab(s) orally once a day  brimonidine 0.2% ophthalmic solution: 1 drop(s) in each affected eye 2 times a day  clopidogrel 75 mg oral tablet: 1 tab(s) orally once a day  finasteride 5 mg oral tablet: 1 tab(s) orally once a day  latanoprost 0.005% ophthalmic solution: 1 drop(s) in each eye 2 times a day  metFORMIN 500 mg oral tablet: 1 tab(s) orally 2 times a day  ramipril 10 mg oral capsule: 1 cap(s) orally 2 times a day  tamsulosin 0.4 mg oral capsule: 1 cap(s) orally once a day  Xarelto 20 mg oral tablet: 1 tab(s) orally once a day   atorvastatin 80 mg oral tablet: 1 tab(s) orally once a day (at bedtime)  brimonidine 0.2% ophthalmic solution: 1 drop(s) in each affected eye 2 times a day  Cardiac Rehab: 2-3x per week for 12 weeks. Cardiologist: Dr. Malgorzata [place]; CAD s/p PCI  clopidogrel 75 mg oral tablet: 1 tab(s) orally once a day  famotidine 40 mg oral tablet: 1 tab(s) orally once a day at bedtime  finasteride 5 mg oral tablet: 1 tab(s) orally once a day  isosorbide mononitrate 30 mg oral tablet, extended release: 1 tab(s) orally once a day  latanoprost 0.005% ophthalmic solution: 1 drop(s) in each eye 2 times a day  metFORMIN 500 mg oral tablet: 1 tab(s) orally 2 times a day  pantoprazole 40 mg oral delayed release tablet: 1 tab(s) orally once a day (before a meal)  ramipril 10 mg oral capsule: 1 cap(s) orally 2 times a day  ranolazine 500 mg oral tablet, extended release: 1 tab(s) orally 2 times a day  tamsulosin 0.4 mg oral capsule: 1 cap(s) orally once a day  Xarelto 20 mg oral tablet: 1 tab(s) orally once a day

## 2025-04-28 NOTE — PROGRESS NOTE ADULT - SUBJECTIVE AND OBJECTIVE BOX
Interventional Cardiology PA Adult Progress Note    Subjective Assessment: Patient seen and examined at bedside resting comfortably.  Denies any acute overnight events. Denies CP, SOB, cough, fever, chills, dizziness, HA. ROS negative except per HPI and Subjective.    	  MEDICATIONS:  isosorbide   mononitrate ER Tablet (IMDUR) 30 milliGRAM(s) Oral daily  lisinopril 40 milliGRAM(s) Oral daily  ranolazine 500 milliGRAM(s) Oral two times a day  aluminum hydroxide/magnesium hydroxide/simethicone Suspension 30 milliLiter(s) Oral every 4 hours PRN  pantoprazole    Tablet 40 milliGRAM(s) Oral before breakfast  atorvastatin 40 milliGRAM(s) Oral at bedtime  dextrose 50% Injectable 25 Gram(s) IV Push once  dextrose 50% Injectable 12.5 Gram(s) IV Push once  dextrose 50% Injectable 25 Gram(s) IV Push once  dextrose Oral Gel 15 Gram(s) Oral once PRN  finasteride 5 milliGRAM(s) Oral daily  glucagon  Injectable 1 milliGRAM(s) IntraMuscular once  insulin lispro (ADMELOG) corrective regimen sliding scale   SubCutaneous Before meals and at bedtime  insulin lispro Injectable (ADMELOG) 5 Unit(s) SubCutaneous three times a day before meals  brimonidine 0.2% Ophthalmic Solution 1 Drop(s) Both EYES two times a day  clopidogrel Tablet 75 milliGRAM(s) Oral daily  dextrose 5%. 1000 milliLiter(s) IV Continuous <Continuous>  dextrose 5%. 1000 milliLiter(s) IV Continuous <Continuous>  influenza  Vaccine (HIGH DOSE) 0.5 milliLiter(s) IntraMuscular once  latanoprost 0.005% Ophthalmic Solution 1 Drop(s) Both EYES at bedtime  sodium chloride 0.9%. 1000 milliLiter(s) IV Continuous <Continuous>  tamsulosin 0.4 milliGRAM(s) Oral at bedtime    [PHYSICAL EXAM:  TELEMETRY:  T(C): 36.3 (04-28-25 @ 12:00), Max: 36.6 (04-27-25 @ 21:45)  HR: 41 (04-28-25 @ 12:00) (41 - 65)  BP: 139/65 (04-28-25 @ 12:00) (92/50 - 139/65)  RR: 18 (04-28-25 @ 12:00) (16 - 20)  SpO2: 96% (04-28-25 @ 12:00) (93% - 97%)  Wt(kg): --  I&O's Summary    27 Apr 2025 07:01  -  28 Apr 2025 07:00  --------------------------------------------------------  IN: 500 mL / OUT: 1000 mL / NET: -500 mL    28 Apr 2025 07:01  -  28 Apr 2025 15:30  --------------------------------------------------------  IN: 0 mL / OUT: 1150 mL / NET: -1150 mL    Appearance: Normal	  HEENT:   Normal oral mucosa, PERRL, EOMI	  Neck: Supple, - JVD; Carotid Bruit   Cardiovascular: Normal S1 S2, No JVD, No murmurs,   Respiratory: Lungs clear to auscultation/Decreased Breath Sounds/No Rales, Rhonchi, Wheezing	  Gastrointestinal:  Soft, Non-tender, + BS	  Skin: No rashes, No ecchymoses, No cyanosis  Extremities: Normal range of motion, No clubbing, cyanosis or edema  Vascular: Peripheral pulses palpable 2+ bilaterally  Neurologic: Non-focal  Psychiatry: A & O x 3, Mood & affect appropriate  	    LABS:	 	  CARDIAC MARKERS:                                  12.2   5.85  )-----------( 113      ( 28 Apr 2025 02:56 )             37.5     04-28    137  |  101  |  29[H]  ----------------------------<  176[H]  4.1   |  26  |  0.93    Ca    8.4      28 Apr 2025 02:56  Mg     1.9     04-28    TPro  6.9  /  Alb  3.8  /  TBili  0.4  /  DBili  x   /  AST  21  /  ALT  16  /  AlkPhos  59  04-28    proBNP:   Lipid Profile:   HgA1c:   TSH:   PT/INR - ( 27 Apr 2025 12:18 )   PT: 14.0 sec;   INR: 1.22          PTT - ( 28 Apr 2025 02:56 )  PTT:77.2 sec    ASSESSMENT/PLAN: 	           Interventional Cardiology PA Adult Progress Note    Subjective Assessment: Patient seen and examined at bedside resting comfortably. Pt notes sharp CP that has been controlled on Imdur. Denies any acute overnight events. Denies SOB, cough, fever, chills, dizziness, HA. ROS negative except per HPI and Subjective.    	  MEDICATIONS:  isosorbide   mononitrate ER Tablet (IMDUR) 30 milliGRAM(s) Oral daily  lisinopril 40 milliGRAM(s) Oral daily  ranolazine 500 milliGRAM(s) Oral two times a day  aluminum hydroxide/magnesium hydroxide/simethicone Suspension 30 milliLiter(s) Oral every 4 hours PRN  pantoprazole    Tablet 40 milliGRAM(s) Oral before breakfast  atorvastatin 40 milliGRAM(s) Oral at bedtime  dextrose 50% Injectable 25 Gram(s) IV Push once  dextrose 50% Injectable 12.5 Gram(s) IV Push once  dextrose 50% Injectable 25 Gram(s) IV Push once  dextrose Oral Gel 15 Gram(s) Oral once PRN  finasteride 5 milliGRAM(s) Oral daily  glucagon  Injectable 1 milliGRAM(s) IntraMuscular once  insulin lispro (ADMELOG) corrective regimen sliding scale   SubCutaneous Before meals and at bedtime  insulin lispro Injectable (ADMELOG) 5 Unit(s) SubCutaneous three times a day before meals  brimonidine 0.2% Ophthalmic Solution 1 Drop(s) Both EYES two times a day  clopidogrel Tablet 75 milliGRAM(s) Oral daily  dextrose 5%. 1000 milliLiter(s) IV Continuous <Continuous>  dextrose 5%. 1000 milliLiter(s) IV Continuous <Continuous>  influenza  Vaccine (HIGH DOSE) 0.5 milliLiter(s) IntraMuscular once  latanoprost 0.005% Ophthalmic Solution 1 Drop(s) Both EYES at bedtime  sodium chloride 0.9%. 1000 milliLiter(s) IV Continuous <Continuous>  tamsulosin 0.4 milliGRAM(s) Oral at bedtime    [PHYSICAL EXAM:  TELEMETRY:  T(C): 36.3 (04-28-25 @ 12:00), Max: 36.6 (04-27-25 @ 21:45)  HR: 41 (04-28-25 @ 12:00) (41 - 65)  BP: 139/65 (04-28-25 @ 12:00) (92/50 - 139/65)  RR: 18 (04-28-25 @ 12:00) (16 - 20)  SpO2: 96% (04-28-25 @ 12:00) (93% - 97%)  Wt(kg): --  I&O's Summary    27 Apr 2025 07:01  -  28 Apr 2025 07:00  --------------------------------------------------------  IN: 500 mL / OUT: 1000 mL / NET: -500 mL    28 Apr 2025 07:01  -  28 Apr 2025 15:30  --------------------------------------------------------  IN: 0 mL / OUT: 1150 mL / NET: -1150 mL    Appearance: Normal	  HEENT:   Normal oral mucosa, PERRL, EOMI	  Neck: Supple, - JVD; Carotid Bruit   Cardiovascular: Normal S1 S2, No murmurs,   Respiratory: Lungs clear to auscultation, No Rales, Rhonchi, Wheezing	  Gastrointestinal:  Soft, Non-tender, + BS	  Skin: No rashes, No ecchymoses, No cyanosis  Extremities: B/l wraps/compression by past podiatry appt,  Normal range of motion, No clubbing, cyanosis or edema  Vascular: Peripheral pulses palpable 2+ bilaterally  Neurologic: Non-focal  Psychiatry: A & O x 3, Mood & affect appropriate  	    LABS:	 	  CARDIAC MARKERS:                     12.2   5.85  )-----------( 113      ( 28 Apr 2025 02:56 )             37.5     04-28    137  |  101  |  29[H]  ----------------------------<  176[H]  4.1   |  26  |  0.93    Ca    8.4      28 Apr 2025 02:56  Mg     1.9     04-28    TPro  6.9  /  Alb  3.8  /  TBili  0.4  /  DBili  x   /  AST  21  /  ALT  16  /  AlkPhos  59  04-28  PT/INR - ( 27 Apr 2025 12:18 )   PT: 14.0 sec;   INR: 1.22          PTT - ( 28 Apr 2025 02:56 )  PTT:77.2 sec               Interventional Cardiology PA Adult Progress Note    Subjective Assessment: Patient seen and examined at bedside resting comfortably. Pt notes sharp CP intermittent, last episode this AM. Denies any acute overnight events. Denies SOB, cough, fever, chills, dizziness, HA. ROS negative except per HPI and Subjective.    	  MEDICATIONS:  isosorbide   mononitrate ER Tablet (IMDUR) 30 milliGRAM(s) Oral daily  lisinopril 40 milliGRAM(s) Oral daily  ranolazine 500 milliGRAM(s) Oral two times a day  aluminum hydroxide/magnesium hydroxide/simethicone Suspension 30 milliLiter(s) Oral every 4 hours PRN  pantoprazole    Tablet 40 milliGRAM(s) Oral before breakfast  atorvastatin 40 milliGRAM(s) Oral at bedtime  dextrose 50% Injectable 25 Gram(s) IV Push once  dextrose 50% Injectable 12.5 Gram(s) IV Push once  dextrose 50% Injectable 25 Gram(s) IV Push once  dextrose Oral Gel 15 Gram(s) Oral once PRN  finasteride 5 milliGRAM(s) Oral daily  glucagon  Injectable 1 milliGRAM(s) IntraMuscular once  insulin lispro (ADMELOG) corrective regimen sliding scale   SubCutaneous Before meals and at bedtime  insulin lispro Injectable (ADMELOG) 5 Unit(s) SubCutaneous three times a day before meals  brimonidine 0.2% Ophthalmic Solution 1 Drop(s) Both EYES two times a day  clopidogrel Tablet 75 milliGRAM(s) Oral daily  dextrose 5%. 1000 milliLiter(s) IV Continuous <Continuous>  dextrose 5%. 1000 milliLiter(s) IV Continuous <Continuous>  influenza  Vaccine (HIGH DOSE) 0.5 milliLiter(s) IntraMuscular once  latanoprost 0.005% Ophthalmic Solution 1 Drop(s) Both EYES at bedtime  sodium chloride 0.9%. 1000 milliLiter(s) IV Continuous <Continuous>  tamsulosin 0.4 milliGRAM(s) Oral at bedtime    [PHYSICAL EXAM:  TELEMETRY:  T(C): 36.3 (04-28-25 @ 12:00), Max: 36.6 (04-27-25 @ 21:45)  HR: 41 (04-28-25 @ 12:00) (41 - 65)  BP: 139/65 (04-28-25 @ 12:00) (92/50 - 139/65)  RR: 18 (04-28-25 @ 12:00) (16 - 20)  SpO2: 96% (04-28-25 @ 12:00) (93% - 97%)  Wt(kg): --  I&O's Summary    27 Apr 2025 07:01  -  28 Apr 2025 07:00  --------------------------------------------------------  IN: 500 mL / OUT: 1000 mL / NET: -500 mL    28 Apr 2025 07:01  -  28 Apr 2025 15:30  --------------------------------------------------------  IN: 0 mL / OUT: 1150 mL / NET: -1150 mL    Appearance: Normal	  HEENT:   Normal oral mucosa, PERRL, EOMI	  Neck: Supple, - JVD; Carotid Bruit   Cardiovascular: Normal S1 S2, No murmurs,   Respiratory: Lungs clear to auscultation, No Rales, Rhonchi, Wheezing	  Gastrointestinal:  Soft, Non-tender, + BS	  Skin: No rashes, No ecchymoses, No cyanosis  Extremities: B/l wraps/compression,  Normal range of motion, No clubbing, cyanosis or edema  Vascular: Peripheral pulses palpable 2+ bilaterally  Neurologic: Non-focal  Psychiatry: A & O x 3, Mood & affect appropriate  	    LABS:	 	  CARDIAC MARKERS:                     12.2   5.85  )-----------( 113      ( 28 Apr 2025 02:56 )             37.5     04-28    137  |  101  |  29[H]  ----------------------------<  176[H]  4.1   |  26  |  0.93    Ca    8.4      28 Apr 2025 02:56  Mg     1.9     04-28    TPro  6.9  /  Alb  3.8  /  TBili  0.4  /  DBili  x   /  AST  21  /  ALT  16  /  AlkPhos  59  04-28  PT/INR - ( 27 Apr 2025 12:18 )   PT: 14.0 sec;   INR: 1.22          PTT - ( 28 Apr 2025 02:56 )  PTT:77.2 sec

## 2025-04-28 NOTE — PROGRESS NOTE ADULT - PROBLEM SELECTOR PLAN 4
-Consider increasing Atorvastatin from 40 mg QD to 80 mg QD  -F/u lipid panel in AM -Inc atorvastatin to 80 mg QD  -F/u lipid panel in AM

## 2025-04-28 NOTE — PROGRESS NOTE ADULT - PROBLEM SELECTOR PLAN 1
-S/p [facility] 4/28/25: [facility] 4/28/25: dLAD 60% (FFR 0.75), mLAD patent stent, LM mild, LCx mild, patent stent, RCA mild diffuse, LVEDP 12mmHg  -C/w Plavix 75 mg QD, Start Xarelto tm AM  -NST 4/22/25: Abnormal pericardial effusion  -Consider increasing Atorvastatin 40 mg QD to 80 mg QD.  -c/w Lisinopril 40 mg QD, Ranolazine 500 mg BID, Imdur 30 mg QD -S/p [facility] 4/28/25: dLAD 60% (FFR 0.75), mLAD patent stent, LM mild, LCx mild, patent stent, RCA mild diffuse, LVEDP 12mmHg  -Continue Plavix 75 mg QD and Xarelto 20 mg QD   -NST 4/22/25: Small sized mild defect in anterior/anterolateral wall that is reversible c/w ischemia  -Inc atorvastatin 80 mg QD  -c/w Lisinopril 40 mg QD, Ranolazine 500 mg BID, Imdur 30 mg QD -S/p [facility] 4/28/25: dLAD 60% (FFR 0.75), mLAD patent stent, LM mild, LCx mild, patent stent, RCA mild diffuse, LVEDP 12mmHg  -Continue Plavix 75 mg QD and Xarelto 20 mg QD   -NST 4/22/25: Small sized mild defect in anterior/anterolateral wall that is reversible c/w ischemia  -Inc atorvastatin 80 mg QD  -c/w Lisinopril 40 mg QD, Ranolazine 500 mg BID, Imdur 30 mg QD\  -TTE (4/23/25): LV function normal (EF 68%). Mod G2DD. Left and Right atrium severely dilated. Mod AR. Mild to Mod LVH. -S/p [facility] 4/28/25: dLAD 60% (FFR 0.75), mLAD patent stent, LM mild, LCx mild, patent stent, RCA mild diffuse, LVEDP 12mmHg  -Continue Plavix 75 mg QD and Xarelto 20 mg QD   -NST 4/22/25: Small sized mild defect in anterior/anterolateral wall that is reversible c/w ischemia  -Inc atorvastatin 80 mg QD  -c/w Lisinopril 40 mg QD, Ranolazine 500 mg BID, Imdur 30 mg QD  -TTE (4/23/25): LV function normal (EF 68%). Mod G2DD. Left and Right atrium severely dilated. Mod AR. Mild to Mod LVH.

## 2025-04-28 NOTE — DISCHARGE NOTE PROVIDER - CARE PROVIDER_API CALL
JUNAID BLACKBURN  [address] [place] [address]  Phone: [phone]  Fax: [phone]  Established Patient  Follow Up Time: 1 week   Edel Mcgarry  Interventional Cardiology  [address], [address]  [place] [address]  Phone: [phone]  Fax: [phone]  Scheduled Appointment: 05/01/2025 02:00 PM

## 2025-04-28 NOTE — PROGRESS NOTE ADULT - NS ATTEND AMEND GEN_ALL_CORE FT
75 yo M, PMHx of CAD s/p PCI x 2 (Most recent FELA x1 LAD 2/2024), DM2, PAD s/p L TMA, pAF on Xarelto (last dose 4/26 AM), HTN, HLD with recent admission to [facility] 4/22-4/24 for chest pain, now presenting to [facility] with midsternal chest pain radiating to the epigastric area that woke him up out of his sleep, relieved by SL NTG. Admitted to cardiac telemetry for unstable angina. Now s/p [facility] 4/28 with FELA to dLAD.     - added imdur and ranexa  - treat GERD with PPI and H2 blocker  - no BB due to bradycardia  - if BP low will lower lisinopril dose  - continue rest of GDMT  - initiate xarelto in am

## 2025-04-28 NOTE — DISCHARGE NOTE PROVIDER - NSDCCPCAREPLAN_GEN_ALL_CORE_FT
PRINCIPAL DISCHARGE DIAGNOSIS  Diagnosis: Unstable angina  Assessment and Plan of Treatment: You were found to have blockages in the arteries of your heart, also known as Coronary Artery Disease. You underwent a cardiac catheterization on 4/28 and received a stent to the distal left anterior descending artery.  PLEASE CONTINUE PLAVIX 75MG DAILY AND XARELTO 20 MG DAILY. DO NOT STOP THESE MEDICATIONS FOR ANY REASON AS THEY ARE KEEPING YOUR STENT OPEN AND PREVENTING A HEART ATTACK.   CONTINUE RANEXA 500 MG BID AND IMDUR 30 MG DAILY TO PREVENT CHEST PAIN EPISODES.  Avoid strenuous activity or heavy lifting anything more than 5lbs for the next five days. Do not take a bath or swim for the next five days; you may shower. For any bleeding or hematoma formation (hardened blood collection under the skin) at the access site of your wrist please hold pressure and go to the emergency room. Please follow up with Dr. Iraheta in 1-2 weeks. For recurrent chest pain, please call your doctor or go to the emergency room.  We have provided you with a prescription for cardiac rehab which is medically supervised exercise program for your heart and has been shown to improve the quantity and quality of life of people with heart disease like yours. You should attend cardiac rehab 3 times per week for 12 weeks. We have provided you with a list of nearby facilities. Please call your insurance carrier to determine which of these facilities are covered under your plan. Please bring this prescription with you to your follow up appointment with your cardiologist who can then further assist you to enroll into a cardiac rehab program.        SECONDARY DISCHARGE DIAGNOSES  Diagnosis: HLD (hyperlipidemia)  Assessment and Plan of Treatment: Please continue atorvastatin 40 mg daily at bedtime to keep your cholesterol low. High cholesterol contributes to heart disease.     PRINCIPAL DISCHARGE DIAGNOSIS  Diagnosis: Unstable angina  Assessment and Plan of Treatment: You were found to have blockages in the arteries of your heart, also known as Coronary Artery Disease. You underwent a cardiac catheterization on 4/28 and received a stent to the distal left anterior descending artery.  PLEASE CONTINUE PLAVIX 75MG DAILY AND XARELTO 20 MG DAILY. DO NOT STOP THESE MEDICATIONS FOR ANY REASON AS THEY ARE KEEPING YOUR STENT OPEN AND PREVENTING A HEART ATTACK.   CONTINUE RANEXA 500 MG BID AND IMDUR 30 MG DAILY TO PREVENT CHEST PAIN EPISODES.  Avoid strenuous activity or heavy lifting anything more than 5lbs for the next five days. Do not take a bath or swim for the next five days; you may shower. For any bleeding or hematoma formation (hardened blood collection under the skin) at the access site of your wrist please hold pressure and go to the emergency room. Please follow up with Dr. Iraheta in 1-2 weeks. For recurrent chest pain, please call your doctor or go to the emergency room.  We have provided you with a prescription for cardiac rehab which is medically supervised exercise program for your heart and has been shown to improve the quantity and quality of life of people with heart disease like yours. You should attend cardiac rehab 3 times per week for 12 weeks. We have provided you with a list of nearby facilities. Please call your insurance carrier to determine which of these facilities are covered under your plan. Please bring this prescription with you to your follow up appointment with your cardiologist who can then further assist you to enroll into a cardiac rehab program.        SECONDARY DISCHARGE DIAGNOSES  Diagnosis: HLD (hyperlipidemia)  Assessment and Plan of Treatment: Please continue atorvastatin 80 mg daily at bedtime to keep your cholesterol low. High cholesterol contributes to heart disease.     PRINCIPAL DISCHARGE DIAGNOSIS  Diagnosis: Unstable angina  Assessment and Plan of Treatment: You were found to have blockages in the arteries of your heart, also known as Coronary Artery Disease. You underwent a cardiac catheterization on 4/28 and received a stent to the distal left anterior descending artery.  PLEASE CONTINUE PLAVIX 75MG DAILY AND XARELTO 20 MG DAILY. DO NOT STOP THESE MEDICATIONS FOR ANY REASON AS THEY ARE KEEPING YOUR STENT OPEN AND PREVENTING A HEART ATTACK.   CONTINUE RANEXA 500 MG twice daily AND IMDUR 30 MG DAILY TO PREVENT CHEST PAIN EPISODES.  PLEASE START PANTOPRAZOLE 40MG DAILY IN AM AND FAMOTIDINE 40MG DAILY IN PM.  Avoid strenuous activity or heavy lifting anything more than 5lbs for the next five days. Do not take a bath or swim for the next five days; you may shower. For any bleeding or hematoma formation (hardened blood collection under the skin) at the access site of your wrist please hold pressure and go to the emergency room. Please follow up with Dr. Mcgarry on 5/1/25 at 2PM. For recurrent chest pain, please call your doctor or go to the emergency room.  We have provided you with a prescription for cardiac rehab which is medically supervised exercise program for your heart and has been shown to improve the quantity and quality of life of people with heart disease like yours. You should attend cardiac rehab 3 times per week for 12 weeks. We have provided you with a list of nearby facilities. Please call your insurance carrier to determine which of these facilities are covered under your plan. Please bring this prescription with you to your follow up appointment with your cardiologist who can then further assist you to enroll into a cardiac rehab program.        SECONDARY DISCHARGE DIAGNOSES  Diagnosis: HLD (hyperlipidemia)  Assessment and Plan of Treatment: Please INCREASE your atorvastatin 80 mg daily at bedtime to keep your cholesterol low. High cholesterol contributes to heart disease.    Diagnosis: Diabetes type 2  Assessment and Plan of Treatment: Your Hemoglobin A1c is 5.6 and your goal A1c is less than 7.0%. This number measures your average blood sugar level over the last three months.  Please RESTART Metformin on 4/27/25as listed for diabetes. Maintain a low carbohydrate, low sugar diet, exercise, monitor your fingerstick blood sugars regarly and follow up with your Endocrinologist/Primary Care Doctor.

## 2025-04-29 ENCOUNTER — TRANSCRIPTION ENCOUNTER (OUTPATIENT)
Age: 75
End: 2025-04-29

## 2025-04-29 VITALS
OXYGEN SATURATION: 96 % | DIASTOLIC BLOOD PRESSURE: 60 MMHG | SYSTOLIC BLOOD PRESSURE: 133 MMHG | HEART RATE: 55 BPM | RESPIRATION RATE: 18 BRPM | TEMPERATURE: 97 F

## 2025-04-29 LAB
ALBUMIN SERPL ELPH-MCNC: 3.7 G/DL — SIGNIFICANT CHANGE UP (ref 3.3–5)
ALP SERPL-CCNC: 53 U/L — SIGNIFICANT CHANGE UP (ref 40–120)
ALT FLD-CCNC: 14 U/L — SIGNIFICANT CHANGE UP (ref 10–45)
ANION GAP SERPL CALC-SCNC: 9 MMOL/L — SIGNIFICANT CHANGE UP (ref 5–17)
APTT BLD: 31.6 SEC — SIGNIFICANT CHANGE UP (ref 26.1–36.8)
AST SERPL-CCNC: 15 U/L — SIGNIFICANT CHANGE UP (ref 10–40)
BILIRUB SERPL-MCNC: 0.3 MG/DL — SIGNIFICANT CHANGE UP (ref 0.2–1.2)
BUN SERPL-MCNC: 22 MG/DL — SIGNIFICANT CHANGE UP (ref 7–23)
CALCIUM SERPL-MCNC: 8.3 MG/DL — LOW (ref 8.4–10.5)
CHLORIDE SERPL-SCNC: 106 MMOL/L — SIGNIFICANT CHANGE UP (ref 96–108)
CHOLEST SERPL-MCNC: 79 MG/DL — SIGNIFICANT CHANGE UP
CO2 SERPL-SCNC: 25 MMOL/L — SIGNIFICANT CHANGE UP (ref 22–31)
CREAT SERPL-MCNC: 1.06 MG/DL — SIGNIFICANT CHANGE UP (ref 0.5–1.3)
EGFR: 74 ML/MIN/1.73M2 — SIGNIFICANT CHANGE UP
EGFR: 74 ML/MIN/1.73M2 — SIGNIFICANT CHANGE UP
GLUCOSE SERPL-MCNC: 144 MG/DL — HIGH (ref 70–99)
HCT VFR BLD CALC: 37 % — LOW (ref 39–50)
HDLC SERPL-MCNC: 30 MG/DL — LOW
HGB BLD-MCNC: 11.8 G/DL — LOW (ref 13–17)
LDLC SERPL-MCNC: 24 MG/DL — SIGNIFICANT CHANGE UP
LIPID PNL WITH DIRECT LDL SERPL: 24 MG/DL — SIGNIFICANT CHANGE UP
MAGNESIUM SERPL-MCNC: 2 MG/DL — SIGNIFICANT CHANGE UP (ref 1.6–2.6)
MCHC RBC-ENTMCNC: 30.6 PG — SIGNIFICANT CHANGE UP (ref 27–34)
MCHC RBC-ENTMCNC: 31.9 G/DL — LOW (ref 32–36)
MCV RBC AUTO: 95.9 FL — SIGNIFICANT CHANGE UP (ref 80–100)
NONHDLC SERPL-MCNC: 49 MG/DL — SIGNIFICANT CHANGE UP
NRBC BLD AUTO-RTO: 0 /100 WBCS — SIGNIFICANT CHANGE UP (ref 0–0)
PLATELET # BLD AUTO: 110 K/UL — LOW (ref 150–400)
POTASSIUM SERPL-MCNC: 4.1 MMOL/L — SIGNIFICANT CHANGE UP (ref 3.5–5.3)
POTASSIUM SERPL-SCNC: 4.1 MMOL/L — SIGNIFICANT CHANGE UP (ref 3.5–5.3)
PROT SERPL-MCNC: 6.3 G/DL — SIGNIFICANT CHANGE UP (ref 6–8.3)
RBC # BLD: 3.86 M/UL — LOW (ref 4.2–5.8)
RBC # FLD: 15.1 % — HIGH (ref 10.3–14.5)
SODIUM SERPL-SCNC: 140 MMOL/L — SIGNIFICANT CHANGE UP (ref 135–145)
TRIGL SERPL-MCNC: 144 MG/DL — SIGNIFICANT CHANGE UP
WBC # BLD: 5.22 K/UL — SIGNIFICANT CHANGE UP (ref 3.8–10.5)
WBC # FLD AUTO: 5.22 K/UL — SIGNIFICANT CHANGE UP (ref 3.8–10.5)

## 2025-04-29 PROCEDURE — 93010 ELECTROCARDIOGRAM REPORT: CPT | Mod: 76

## 2025-04-29 PROCEDURE — 99239 HOSP IP/OBS DSCHRG MGMT >30: CPT

## 2025-04-29 RX ORDER — ATORVASTATIN CALCIUM 80 MG/1
1 TABLET, FILM COATED ORAL
Refills: 0 | DISCHARGE

## 2025-04-29 RX ORDER — ATORVASTATIN CALCIUM 80 MG/1
1 TABLET, FILM COATED ORAL
Qty: 30 | Refills: 5
Start: 2025-04-29 | End: 2025-10-25

## 2025-04-29 RX ORDER — CLOPIDOGREL BISULFATE 75 MG/1
1 TABLET, FILM COATED ORAL
Refills: 0 | DISCHARGE

## 2025-04-29 RX ORDER — ISOSORBIDE MONONITRATE 60 MG/1
1 TABLET, EXTENDED RELEASE ORAL
Qty: 30 | Refills: 3
Start: 2025-04-29 | End: 2025-08-26

## 2025-04-29 RX ORDER — RANOLAZINE 1000 MG/1
1 TABLET, FILM COATED, EXTENDED RELEASE ORAL
Qty: 60 | Refills: 3
Start: 2025-04-29 | End: 2025-08-26

## 2025-04-29 RX ORDER — RIVAROXABAN 10 MG/1
1 TABLET, FILM COATED ORAL
Refills: 0 | DISCHARGE

## 2025-04-29 RX ORDER — CLOPIDOGREL BISULFATE 75 MG/1
1 TABLET, FILM COATED ORAL
Qty: 30 | Refills: 11
Start: 2025-04-29 | End: 2026-04-23

## 2025-04-29 RX ORDER — RIVAROXABAN 10 MG/1
1 TABLET, FILM COATED ORAL
Qty: 30 | Refills: 11
Start: 2025-04-29 | End: 2026-04-23

## 2025-04-29 RX ADMIN — INSULIN LISPRO 4: 100 INJECTION, SOLUTION INTRAVENOUS; SUBCUTANEOUS at 08:50

## 2025-04-29 RX ADMIN — LISINOPRIL 40 MILLIGRAM(S): 5 TABLET ORAL at 05:45

## 2025-04-29 RX ADMIN — ISOSORBIDE MONONITRATE 30 MILLIGRAM(S): 60 TABLET, EXTENDED RELEASE ORAL at 11:03

## 2025-04-29 RX ADMIN — CLOPIDOGREL BISULFATE 75 MILLIGRAM(S): 75 TABLET, FILM COATED ORAL at 11:03

## 2025-04-29 RX ADMIN — FINASTERIDE 5 MILLIGRAM(S): 1 TABLET, FILM COATED ORAL at 11:03

## 2025-04-29 RX ADMIN — RANOLAZINE 500 MILLIGRAM(S): 1000 TABLET, FILM COATED, EXTENDED RELEASE ORAL at 05:46

## 2025-04-29 RX ADMIN — BRIMONIDINE TARTRATE 1 DROP(S): 1.5 SOLUTION/ DROPS OPHTHALMIC at 05:45

## 2025-04-29 RX ADMIN — Medication 40 MILLIGRAM(S): at 05:46

## 2025-04-29 NOTE — DISCHARGE NOTE NURSING/CASE MANAGEMENT/SOCIAL WORK - FINANCIAL ASSISTANCE
[facility] provides services at a reduced cost to those who are determined to be eligible through [facility]’s financial assistance program. Information regarding [facility]’s financial assistance program can be found by going to [email or web address] or by calling [phone].

## 2025-04-29 NOTE — DISCHARGE NOTE NURSING/CASE MANAGEMENT/SOCIAL WORK - PATIENT PORTAL LINK FT
You can access the FollowMyHealth Patient Portal offered by [facility] by registering at the following website: [email or web address]. By joining [facility]’s FollowMyHealth portal, you will also be able to view your health information using other applications (apps) compatible with our system.

## 2025-04-30 ENCOUNTER — NON-APPOINTMENT (OUTPATIENT)
Age: 75
End: 2025-04-30

## 2025-04-30 DIAGNOSIS — Z95.5 PRESENCE OF CORONARY ANGIOPLASTY IMPLANT AND GRAFT: ICD-10-CM

## 2025-04-30 DIAGNOSIS — I48.91 UNSPECIFIED ATRIAL FIBRILLATION: ICD-10-CM

## 2025-04-30 DIAGNOSIS — I10 ESSENTIAL (PRIMARY) HYPERTENSION: ICD-10-CM

## 2025-04-30 DIAGNOSIS — E78.5 HYPERLIPIDEMIA, UNSPECIFIED: ICD-10-CM

## 2025-05-01 ENCOUNTER — NON-APPOINTMENT (OUTPATIENT)
Age: 75
End: 2025-05-01

## 2025-05-01 ENCOUNTER — APPOINTMENT (OUTPATIENT)
Dept: HEART AND VASCULAR | Facility: CLINIC | Age: 75
End: 2025-05-01

## 2025-05-01 VITALS
TEMPERATURE: 98 F | OXYGEN SATURATION: 98 % | BODY MASS INDEX: 30.16 KG/M2 | DIASTOLIC BLOOD PRESSURE: 56 MMHG | HEART RATE: 51 BPM | SYSTOLIC BLOOD PRESSURE: 125 MMHG | HEIGHT: 68 IN | WEIGHT: 199 LBS

## 2025-05-01 PROCEDURE — 93000 ELECTROCARDIOGRAM COMPLETE: CPT

## 2025-05-01 PROCEDURE — 99214 OFFICE O/P EST MOD 30 MIN: CPT

## 2025-05-01 RX ORDER — ISOSORBIDE DINITRATE 30 MG/1
30 TABLET ORAL
Refills: 0 | Status: DISCONTINUED | COMMUNITY
End: 2025-05-01

## 2025-05-01 RX ORDER — FAMOTIDINE 40 MG/1
40 TABLET, FILM COATED ORAL
Refills: 0 | Status: ACTIVE | COMMUNITY

## 2025-05-01 RX ORDER — PANTOPRAZOLE 40 MG/1
40 TABLET, DELAYED RELEASE ORAL
Refills: 0 | Status: ACTIVE | COMMUNITY

## 2025-05-01 RX ORDER — ATORVASTATIN CALCIUM 80 MG/1
80 TABLET, FILM COATED ORAL
Qty: 1 | Refills: 3 | Status: ACTIVE | COMMUNITY
Start: 2025-05-01

## 2025-05-01 RX ORDER — RANOLAZINE 500 MG/1
500 TABLET, FILM COATED, EXTENDED RELEASE ORAL
Refills: 0 | Status: ACTIVE | COMMUNITY

## 2025-05-01 RX ORDER — RAMIPRIL 10 MG/1
10 CAPSULE ORAL DAILY
Refills: 0 | Status: ACTIVE | COMMUNITY
Start: 2025-05-01

## 2025-05-01 RX ORDER — ISOSORBIDE MONONITRATE 30 MG/1
30 TABLET, EXTENDED RELEASE ORAL DAILY
Qty: 90 | Refills: 3 | Status: ACTIVE | COMMUNITY
Start: 2025-05-01

## 2025-05-02 ENCOUNTER — APPOINTMENT (OUTPATIENT)
Dept: ORTHOPEDIC SURGERY | Age: 75
End: 2025-05-02

## 2025-05-02 ENCOUNTER — APPOINTMENT (OUTPATIENT)
Dept: ULTRASOUND IMAGING | Facility: HOSPITAL | Age: 75
End: 2025-05-02

## 2025-05-05 LAB — APO LP(A) SERPL-MCNC: 40.1 NMOL/L

## 2025-05-13 DIAGNOSIS — E78.5 HYPERLIPIDEMIA, UNSPECIFIED: ICD-10-CM

## 2025-05-13 DIAGNOSIS — Z95.5 PRESENCE OF CORONARY ANGIOPLASTY IMPLANT AND GRAFT: ICD-10-CM

## 2025-05-13 DIAGNOSIS — I35.1 NONRHEUMATIC AORTIC (VALVE) INSUFFICIENCY: ICD-10-CM

## 2025-05-13 DIAGNOSIS — Z98.62 PERIPHERAL VASCULAR ANGIOPLASTY STATUS: ICD-10-CM

## 2025-05-13 DIAGNOSIS — K21.9 GASTRO-ESOPHAGEAL REFLUX DISEASE WITHOUT ESOPHAGITIS: ICD-10-CM

## 2025-05-13 DIAGNOSIS — I48.0 PAROXYSMAL ATRIAL FIBRILLATION: ICD-10-CM

## 2025-05-13 DIAGNOSIS — I25.110 ATHEROSCLEROTIC HEART DISEASE OF NATIVE CORONARY ARTERY WITH UNSTABLE ANGINA PECTORIS: ICD-10-CM

## 2025-05-13 DIAGNOSIS — I10 ESSENTIAL (PRIMARY) HYPERTENSION: ICD-10-CM

## 2025-05-13 DIAGNOSIS — Z79.84 LONG TERM (CURRENT) USE OF ORAL HYPOGLYCEMIC DRUGS: ICD-10-CM

## 2025-05-13 DIAGNOSIS — E11.51 TYPE 2 DIABETES MELLITUS WITH DIABETIC PERIPHERAL ANGIOPATHY WITHOUT GANGRENE: ICD-10-CM

## 2025-05-13 DIAGNOSIS — R07.9 CHEST PAIN, UNSPECIFIED: ICD-10-CM

## 2025-05-13 DIAGNOSIS — Z79.02 LONG TERM (CURRENT) USE OF ANTITHROMBOTICS/ANTIPLATELETS: ICD-10-CM

## 2025-05-13 DIAGNOSIS — Z79.01 LONG TERM (CURRENT) USE OF ANTICOAGULANTS: ICD-10-CM

## 2025-05-20 ENCOUNTER — APPOINTMENT (OUTPATIENT)
Dept: HEART AND VASCULAR | Facility: CLINIC | Age: 75
End: 2025-05-20

## 2025-06-02 PROCEDURE — 71045 X-RAY EXAM CHEST 1 VIEW: CPT

## 2025-06-02 PROCEDURE — 80053 COMPREHEN METABOLIC PANEL: CPT

## 2025-06-02 PROCEDURE — 99291 CRITICAL CARE FIRST HOUR: CPT

## 2025-06-02 PROCEDURE — 83735 ASSAY OF MAGNESIUM: CPT

## 2025-06-02 PROCEDURE — 36415 COLL VENOUS BLD VENIPUNCTURE: CPT

## 2025-06-02 PROCEDURE — C1725: CPT

## 2025-06-02 PROCEDURE — 82553 CREATINE MB FRACTION: CPT

## 2025-06-02 PROCEDURE — 82962 GLUCOSE BLOOD TEST: CPT

## 2025-06-02 PROCEDURE — 93005 ELECTROCARDIOGRAM TRACING: CPT

## 2025-06-02 PROCEDURE — 85730 THROMBOPLASTIN TIME PARTIAL: CPT

## 2025-06-02 PROCEDURE — C1894: CPT

## 2025-06-02 PROCEDURE — 82550 ASSAY OF CK (CPK): CPT

## 2025-06-02 PROCEDURE — 84484 ASSAY OF TROPONIN QUANT: CPT

## 2025-06-02 PROCEDURE — 80061 LIPID PANEL: CPT

## 2025-06-02 PROCEDURE — C1769: CPT

## 2025-06-02 PROCEDURE — C1874: CPT

## 2025-06-02 PROCEDURE — C8923: CPT

## 2025-06-02 PROCEDURE — 85610 PROTHROMBIN TIME: CPT

## 2025-06-02 PROCEDURE — 85027 COMPLETE CBC AUTOMATED: CPT

## 2025-06-02 PROCEDURE — 83880 ASSAY OF NATRIURETIC PEPTIDE: CPT

## 2025-06-02 PROCEDURE — 85025 COMPLETE CBC W/AUTO DIFF WBC: CPT

## 2025-06-02 PROCEDURE — 80048 BASIC METABOLIC PNL TOTAL CA: CPT

## 2025-06-02 PROCEDURE — 85347 COAGULATION TIME ACTIVATED: CPT

## 2025-06-02 PROCEDURE — C1887: CPT

## 2025-06-18 ENCOUNTER — EMERGENCY (EMERGENCY)
Facility: HOSPITAL | Age: 75
LOS: 1 days | End: 2025-06-18
Attending: EMERGENCY MEDICINE | Admitting: EMERGENCY MEDICINE
Payer: MEDICARE

## 2025-06-18 VITALS
RESPIRATION RATE: 17 BRPM | HEIGHT: 69 IN | HEART RATE: 62 BPM | TEMPERATURE: 99 F | WEIGHT: 184.09 LBS | OXYGEN SATURATION: 96 % | SYSTOLIC BLOOD PRESSURE: 115 MMHG | DIASTOLIC BLOOD PRESSURE: 70 MMHG

## 2025-06-18 VITALS
HEART RATE: 80 BPM | RESPIRATION RATE: 18 BRPM | SYSTOLIC BLOOD PRESSURE: 121 MMHG | OXYGEN SATURATION: 100 % | DIASTOLIC BLOOD PRESSURE: 83 MMHG | TEMPERATURE: 98 F

## 2025-06-18 DIAGNOSIS — Z98.62 PERIPHERAL VASCULAR ANGIOPLASTY STATUS: Chronic | ICD-10-CM

## 2025-06-18 DIAGNOSIS — S98.139A COMPLETE TRAUMATIC AMPUTATION OF ONE UNSPECIFIED LESSER TOE, INITIAL ENCOUNTER: Chronic | ICD-10-CM

## 2025-06-18 DIAGNOSIS — Z98.890 OTHER SPECIFIED POSTPROCEDURAL STATES: Chronic | ICD-10-CM

## 2025-06-18 PROCEDURE — 99284 EMERGENCY DEPT VISIT MOD MDM: CPT

## 2025-06-20 DIAGNOSIS — I10 ESSENTIAL (PRIMARY) HYPERTENSION: ICD-10-CM

## 2025-06-20 DIAGNOSIS — I25.10 ATHEROSCLEROTIC HEART DISEASE OF NATIVE CORONARY ARTERY WITHOUT ANGINA PECTORIS: ICD-10-CM

## 2025-06-20 DIAGNOSIS — E78.5 HYPERLIPIDEMIA, UNSPECIFIED: ICD-10-CM

## 2025-06-20 DIAGNOSIS — E11.621 TYPE 2 DIABETES MELLITUS WITH FOOT ULCER: ICD-10-CM

## 2025-06-20 DIAGNOSIS — M79.672 PAIN IN LEFT FOOT: ICD-10-CM

## 2025-06-20 DIAGNOSIS — Z79.01 LONG TERM (CURRENT) USE OF ANTICOAGULANTS: ICD-10-CM

## 2025-06-20 DIAGNOSIS — L97.529 NON-PRESSURE CHRONIC ULCER OF OTHER PART OF LEFT FOOT WITH UNSPECIFIED SEVERITY: ICD-10-CM

## 2025-06-20 DIAGNOSIS — I48.91 UNSPECIFIED ATRIAL FIBRILLATION: ICD-10-CM

## 2025-06-20 DIAGNOSIS — Z95.5 PRESENCE OF CORONARY ANGIOPLASTY IMPLANT AND GRAFT: ICD-10-CM

## 2025-06-20 DIAGNOSIS — E11.51 TYPE 2 DIABETES MELLITUS WITH DIABETIC PERIPHERAL ANGIOPATHY WITHOUT GANGRENE: ICD-10-CM

## 2025-08-08 ENCOUNTER — EMERGENCY (EMERGENCY)
Facility: HOSPITAL | Age: 75
LOS: 1 days | End: 2025-08-08
Attending: STUDENT IN AN ORGANIZED HEALTH CARE EDUCATION/TRAINING PROGRAM | Admitting: STUDENT IN AN ORGANIZED HEALTH CARE EDUCATION/TRAINING PROGRAM
Payer: MEDICARE

## 2025-08-08 VITALS
SYSTOLIC BLOOD PRESSURE: 126 MMHG | RESPIRATION RATE: 18 BRPM | TEMPERATURE: 98 F | HEART RATE: 71 BPM | WEIGHT: 190.04 LBS | DIASTOLIC BLOOD PRESSURE: 58 MMHG | HEIGHT: 69 IN | OXYGEN SATURATION: 97 %

## 2025-08-08 VITALS
SYSTOLIC BLOOD PRESSURE: 115 MMHG | HEART RATE: 68 BPM | RESPIRATION RATE: 20 BRPM | DIASTOLIC BLOOD PRESSURE: 62 MMHG | TEMPERATURE: 98 F | OXYGEN SATURATION: 95 %

## 2025-08-08 DIAGNOSIS — Z95.5 PRESENCE OF CORONARY ANGIOPLASTY IMPLANT AND GRAFT: ICD-10-CM

## 2025-08-08 DIAGNOSIS — R39.15 URGENCY OF URINATION: ICD-10-CM

## 2025-08-08 DIAGNOSIS — I48.0 PAROXYSMAL ATRIAL FIBRILLATION: ICD-10-CM

## 2025-08-08 DIAGNOSIS — I25.10 ATHEROSCLEROTIC HEART DISEASE OF NATIVE CORONARY ARTERY WITHOUT ANGINA PECTORIS: ICD-10-CM

## 2025-08-08 DIAGNOSIS — Z79.01 LONG TERM (CURRENT) USE OF ANTICOAGULANTS: ICD-10-CM

## 2025-08-08 DIAGNOSIS — S98.139A COMPLETE TRAUMATIC AMPUTATION OF ONE UNSPECIFIED LESSER TOE, INITIAL ENCOUNTER: Chronic | ICD-10-CM

## 2025-08-08 DIAGNOSIS — Z98.890 OTHER SPECIFIED POSTPROCEDURAL STATES: ICD-10-CM

## 2025-08-08 DIAGNOSIS — E78.5 HYPERLIPIDEMIA, UNSPECIFIED: ICD-10-CM

## 2025-08-08 DIAGNOSIS — Z98.62 PERIPHERAL VASCULAR ANGIOPLASTY STATUS: Chronic | ICD-10-CM

## 2025-08-08 DIAGNOSIS — E11.51 TYPE 2 DIABETES MELLITUS WITH DIABETIC PERIPHERAL ANGIOPATHY WITHOUT GANGRENE: ICD-10-CM

## 2025-08-08 DIAGNOSIS — I10 ESSENTIAL (PRIMARY) HYPERTENSION: ICD-10-CM

## 2025-08-08 DIAGNOSIS — Z98.890 OTHER SPECIFIED POSTPROCEDURAL STATES: Chronic | ICD-10-CM

## 2025-08-08 LAB
ANION GAP SERPL CALC-SCNC: 8 MMOL/L — SIGNIFICANT CHANGE UP (ref 5–17)
APPEARANCE UR: CLEAR — SIGNIFICANT CHANGE UP
BASOPHILS # BLD AUTO: 0.05 K/UL — SIGNIFICANT CHANGE UP (ref 0–0.2)
BASOPHILS NFR BLD AUTO: 0.8 % — SIGNIFICANT CHANGE UP (ref 0–2)
BILIRUB UR-MCNC: NEGATIVE — SIGNIFICANT CHANGE UP
BUN SERPL-MCNC: 18 MG/DL — SIGNIFICANT CHANGE UP (ref 7–23)
CALCIUM SERPL-MCNC: 8.6 MG/DL — SIGNIFICANT CHANGE UP (ref 8.4–10.5)
CHLORIDE SERPL-SCNC: 97 MMOL/L — SIGNIFICANT CHANGE UP (ref 96–108)
CO2 SERPL-SCNC: 26 MMOL/L — SIGNIFICANT CHANGE UP (ref 22–31)
COLOR SPEC: YELLOW — SIGNIFICANT CHANGE UP
CREAT SERPL-MCNC: 1.03 MG/DL — SIGNIFICANT CHANGE UP (ref 0.5–1.3)
DIFF PNL FLD: NEGATIVE — SIGNIFICANT CHANGE UP
EGFR: 77 ML/MIN/1.73M2 — SIGNIFICANT CHANGE UP
EGFR: 77 ML/MIN/1.73M2 — SIGNIFICANT CHANGE UP
EOSINOPHIL # BLD AUTO: 0.13 K/UL — SIGNIFICANT CHANGE UP (ref 0–0.5)
EOSINOPHIL NFR BLD AUTO: 2 % — SIGNIFICANT CHANGE UP (ref 0–6)
GLUCOSE SERPL-MCNC: 121 MG/DL — HIGH (ref 70–99)
GLUCOSE UR QL: NEGATIVE MG/DL — SIGNIFICANT CHANGE UP
HCT VFR BLD CALC: 40 % — SIGNIFICANT CHANGE UP (ref 39–50)
HGB BLD-MCNC: 13.3 G/DL — SIGNIFICANT CHANGE UP (ref 13–17)
IMM GRANULOCYTES # BLD AUTO: 0.04 K/UL — SIGNIFICANT CHANGE UP (ref 0–0.07)
IMM GRANULOCYTES NFR BLD AUTO: 0.6 % — SIGNIFICANT CHANGE UP (ref 0–0.9)
KETONES UR QL: NEGATIVE MG/DL — SIGNIFICANT CHANGE UP
LEUKOCYTE ESTERASE UR-ACNC: NEGATIVE — SIGNIFICANT CHANGE UP
LYMPHOCYTES # BLD AUTO: 1.05 K/UL — SIGNIFICANT CHANGE UP (ref 1–3.3)
LYMPHOCYTES NFR BLD AUTO: 16 % — SIGNIFICANT CHANGE UP (ref 13–44)
MCHC RBC-ENTMCNC: 31.4 PG — SIGNIFICANT CHANGE UP (ref 27–34)
MCHC RBC-ENTMCNC: 33.3 G/DL — SIGNIFICANT CHANGE UP (ref 32–36)
MCV RBC AUTO: 94.3 FL — SIGNIFICANT CHANGE UP (ref 80–100)
MONOCYTES # BLD AUTO: 0.84 K/UL — SIGNIFICANT CHANGE UP (ref 0–0.9)
MONOCYTES NFR BLD AUTO: 12.8 % — SIGNIFICANT CHANGE UP (ref 2–14)
NEUTROPHILS # BLD AUTO: 4.45 K/UL — SIGNIFICANT CHANGE UP (ref 1.8–7.4)
NEUTROPHILS NFR BLD AUTO: 67.8 % — SIGNIFICANT CHANGE UP (ref 43–77)
NITRITE UR-MCNC: NEGATIVE — SIGNIFICANT CHANGE UP
NRBC # BLD AUTO: 0 K/UL — SIGNIFICANT CHANGE UP (ref 0–0)
NRBC # FLD: 0 K/UL — SIGNIFICANT CHANGE UP (ref 0–0)
NRBC BLD AUTO-RTO: 0 /100 WBCS — SIGNIFICANT CHANGE UP (ref 0–0)
PH UR: 7.5 — SIGNIFICANT CHANGE UP (ref 5–8)
PLATELET # BLD AUTO: 147 K/UL — LOW (ref 150–400)
PMV BLD: 12.3 FL — SIGNIFICANT CHANGE UP (ref 7–13)
POTASSIUM SERPL-MCNC: 4.8 MMOL/L — SIGNIFICANT CHANGE UP (ref 3.5–5.3)
POTASSIUM SERPL-SCNC: 4.8 MMOL/L — SIGNIFICANT CHANGE UP (ref 3.5–5.3)
PROT UR-MCNC: NEGATIVE MG/DL — SIGNIFICANT CHANGE UP
RBC # BLD: 4.24 M/UL — SIGNIFICANT CHANGE UP (ref 4.2–5.8)
RBC # FLD: 14.6 % — HIGH (ref 10.3–14.5)
SODIUM SERPL-SCNC: 131 MMOL/L — LOW (ref 135–145)
SP GR SPEC: 1.01 — SIGNIFICANT CHANGE UP (ref 1–1.03)
UROBILINOGEN FLD QL: 1 MG/DL — SIGNIFICANT CHANGE UP (ref 0.2–1)
WBC # BLD: 6.56 K/UL — SIGNIFICANT CHANGE UP (ref 3.8–10.5)
WBC # FLD AUTO: 6.56 K/UL — SIGNIFICANT CHANGE UP (ref 3.8–10.5)

## 2025-08-08 PROCEDURE — 36415 COLL VENOUS BLD VENIPUNCTURE: CPT

## 2025-08-08 PROCEDURE — 99283 EMERGENCY DEPT VISIT LOW MDM: CPT

## 2025-08-08 PROCEDURE — 99284 EMERGENCY DEPT VISIT MOD MDM: CPT

## 2025-08-08 PROCEDURE — 85025 COMPLETE CBC W/AUTO DIFF WBC: CPT

## 2025-08-08 PROCEDURE — 81003 URINALYSIS AUTO W/O SCOPE: CPT

## 2025-08-08 PROCEDURE — 80048 BASIC METABOLIC PNL TOTAL CA: CPT

## 2025-08-12 ENCOUNTER — NON-APPOINTMENT (OUTPATIENT)
Age: 75
End: 2025-08-12

## 2025-08-14 ENCOUNTER — APPOINTMENT (OUTPATIENT)
Dept: HEART AND VASCULAR | Facility: CLINIC | Age: 75
End: 2025-08-14

## (undated) DEVICE — TUBING TUR 2 PRONG

## (undated) DEVICE — FOLEY CATH 3-WAY 22FR 30CC LATEX HEMATURIA COUDE

## (undated) DEVICE — TUBING RANGER FLUID IRRIGATION SET DISP

## (undated) DEVICE — DRAPE MICROSCOPE KNOB COVER SMALL (2 PCS)

## (undated) DEVICE — CONTAINER TISSUE COLLECTING DISP

## (undated) DEVICE — TAPE SILK 3"

## (undated) DEVICE — GOWN SLEEVES

## (undated) DEVICE — CANNULA IRR ANT CHAMBER 30G

## (undated) DEVICE — MEDELA OVERFLOW FILTER DISPOSABLE

## (undated) DEVICE — TRANSFORMER INTREPID I/A 0.3MM

## (undated) DEVICE — TUBING SET FOR SUCTION PUMP

## (undated) DEVICE — PACK CENTURION 2.4MM

## (undated) DEVICE — SOL IRR BAG NS 0.9% 3000ML

## (undated) DEVICE — SUT NYLON 10-0 12" CU-5

## (undated) DEVICE — ELCTR BIVAP BIPOLAR VAPORIZATION 26FR

## (undated) DEVICE — GLV 7.5 PROTEXIS (WHITE)

## (undated) DEVICE — TIP OZIL 12 DEGREE MINI FLARE

## (undated) DEVICE — KNIFE ALCON PARACENTESIS CLEARCUT SIDEPORT 1MM (YELLOW)

## (undated) DEVICE — SOL IRR BAL SALT 500ML

## (undated) DEVICE — Device

## (undated) DEVICE — ELCTR CUTTING 24/26FR

## (undated) DEVICE — UROVAC

## (undated) DEVICE — GLV 6.5 PROTEXIS W HYDROGEL

## (undated) DEVICE — PACK CYSTO

## (undated) DEVICE — DRAINAGE BAG URINARY 4L

## (undated) DEVICE — PACK ANTERIOR SEGMENT

## (undated) DEVICE — SOL SYR OPHTHALMIC VISION BLUE TRYPAN BLUE 0.06% 0.5 ML